# Patient Record
Sex: FEMALE | Race: WHITE | NOT HISPANIC OR LATINO | Employment: FULL TIME | URBAN - METROPOLITAN AREA
[De-identification: names, ages, dates, MRNs, and addresses within clinical notes are randomized per-mention and may not be internally consistent; named-entity substitution may affect disease eponyms.]

---

## 2018-01-22 ENCOUNTER — OFFICE VISIT CONVERTED (OUTPATIENT)
Dept: FAMILY MEDICINE CLINIC | Facility: CLINIC | Age: 48
End: 2018-01-22
Attending: NURSE PRACTITIONER

## 2018-03-15 ENCOUNTER — OFFICE VISIT CONVERTED (OUTPATIENT)
Dept: FAMILY MEDICINE CLINIC | Facility: CLINIC | Age: 48
End: 2018-03-15
Attending: NURSE PRACTITIONER

## 2018-03-15 ENCOUNTER — CONVERSION ENCOUNTER (OUTPATIENT)
Dept: FAMILY MEDICINE CLINIC | Facility: CLINIC | Age: 48
End: 2018-03-15

## 2018-06-19 ENCOUNTER — OFFICE VISIT CONVERTED (OUTPATIENT)
Dept: FAMILY MEDICINE CLINIC | Facility: CLINIC | Age: 48
End: 2018-06-19
Attending: NURSE PRACTITIONER

## 2018-12-17 ENCOUNTER — OFFICE VISIT CONVERTED (OUTPATIENT)
Dept: FAMILY MEDICINE CLINIC | Facility: CLINIC | Age: 48
End: 2018-12-17
Attending: NURSE PRACTITIONER

## 2018-12-17 ENCOUNTER — CONVERSION ENCOUNTER (OUTPATIENT)
Dept: FAMILY MEDICINE CLINIC | Facility: CLINIC | Age: 48
End: 2018-12-17

## 2019-07-12 ENCOUNTER — HOSPITAL ENCOUNTER (OUTPATIENT)
Dept: URGENT CARE | Facility: CLINIC | Age: 49
Discharge: HOME | End: 2019-07-12
Attending: PHYSICIAN ASSISTANT

## 2019-07-29 ENCOUNTER — HOSPITAL ENCOUNTER (OUTPATIENT)
Dept: URGENT CARE | Facility: CLINIC | Age: 49
Discharge: HOME | End: 2019-07-29
Attending: NURSE PRACTITIONER

## 2019-07-30 ENCOUNTER — OFFICE VISIT CONVERTED (OUTPATIENT)
Dept: FAMILY MEDICINE CLINIC | Facility: CLINIC | Age: 49
End: 2019-07-30
Attending: NURSE PRACTITIONER

## 2019-07-30 ENCOUNTER — CONVERSION ENCOUNTER (OUTPATIENT)
Dept: FAMILY MEDICINE CLINIC | Facility: CLINIC | Age: 49
End: 2019-07-30

## 2019-08-02 ENCOUNTER — HOSPITAL ENCOUNTER (OUTPATIENT)
Dept: URGENT CARE | Facility: CLINIC | Age: 49
Discharge: HOME | End: 2019-08-02

## 2019-08-26 ENCOUNTER — OFFICE VISIT CONVERTED (OUTPATIENT)
Dept: FAMILY MEDICINE CLINIC | Facility: CLINIC | Age: 49
End: 2019-08-26
Attending: NURSE PRACTITIONER

## 2019-08-26 ENCOUNTER — CONVERSION ENCOUNTER (OUTPATIENT)
Dept: FAMILY MEDICINE CLINIC | Facility: CLINIC | Age: 49
End: 2019-08-26

## 2019-09-16 ENCOUNTER — CONVERSION ENCOUNTER (OUTPATIENT)
Dept: FAMILY MEDICINE CLINIC | Facility: CLINIC | Age: 49
End: 2019-09-16

## 2019-09-16 ENCOUNTER — OFFICE VISIT CONVERTED (OUTPATIENT)
Dept: FAMILY MEDICINE CLINIC | Facility: CLINIC | Age: 49
End: 2019-09-16
Attending: NURSE PRACTITIONER

## 2019-11-06 ENCOUNTER — HOSPITAL ENCOUNTER (OUTPATIENT)
Dept: URGENT CARE | Facility: CLINIC | Age: 49
Discharge: HOME | End: 2019-11-06

## 2019-11-11 ENCOUNTER — OFFICE VISIT CONVERTED (OUTPATIENT)
Dept: FAMILY MEDICINE CLINIC | Facility: CLINIC | Age: 49
End: 2019-11-11
Attending: NURSE PRACTITIONER

## 2019-12-26 ENCOUNTER — CONVERSION ENCOUNTER (OUTPATIENT)
Dept: FAMILY MEDICINE CLINIC | Facility: CLINIC | Age: 49
End: 2019-12-26

## 2019-12-26 ENCOUNTER — OFFICE VISIT CONVERTED (OUTPATIENT)
Dept: FAMILY MEDICINE CLINIC | Facility: CLINIC | Age: 49
End: 2019-12-26
Attending: NURSE PRACTITIONER

## 2020-01-02 ENCOUNTER — HOSPITAL ENCOUNTER (OUTPATIENT)
Dept: MAMMOGRAPHY | Facility: HOSPITAL | Age: 50
Discharge: HOME | End: 2020-01-02
Attending: NURSE PRACTITIONER

## 2020-01-09 ENCOUNTER — HOSPITAL ENCOUNTER (OUTPATIENT)
Dept: OTHER | Facility: HOSPITAL | Age: 50
Discharge: HOME | End: 2020-01-09
Attending: NURSE PRACTITIONER

## 2020-01-09 LAB
25(OH)D3 SERPL-MCNC: 22.5 NG/ML (ref 30–100)
ALBUMIN SERPL-MCNC: 4.3 G/DL (ref 3.5–5)
ALBUMIN/GLOB SERPL: 1.4 {RATIO} (ref 1.4–2.6)
ALP SERPL-CCNC: 76 U/L (ref 42–98)
ALT SERPL-CCNC: 12 U/L (ref 10–40)
ANION GAP SERPL CALC-SCNC: 16 MMOL/L (ref 8–19)
AST SERPL-CCNC: 12 U/L (ref 15–50)
BASOPHILS # BLD AUTO: 0.05 10*3/UL (ref 0–0.2)
BASOPHILS NFR BLD AUTO: 0.6 % (ref 0–3)
BILIRUB SERPL-MCNC: 0.41 MG/DL (ref 0.2–1.3)
BUN SERPL-MCNC: 10 MG/DL (ref 5–25)
BUN/CREAT SERPL: 16 {RATIO} (ref 6–20)
CALCIUM SERPL-MCNC: 9.3 MG/DL (ref 8.7–10.4)
CHLORIDE SERPL-SCNC: 99 MMOL/L (ref 99–111)
CHOLEST SERPL-MCNC: 126 MG/DL (ref 107–200)
CHOLEST/HDLC SERPL: 3.3 {RATIO} (ref 3–6)
CONV ABS IMM GRAN: 0.03 10*3/UL (ref 0–0.2)
CONV CO2: 28 MMOL/L (ref 22–32)
CONV CREATININE URINE, RANDOM: 99.2 MG/DL (ref 10–300)
CONV IMMATURE GRAN: 0.4 % (ref 0–1.8)
CONV MICROALBUM.,U,RANDOM: 16.9 MG/L (ref 0–20)
CONV TOTAL PROTEIN: 7.4 G/DL (ref 6.3–8.2)
CREAT UR-MCNC: 0.63 MG/DL (ref 0.5–0.9)
DEPRECATED RDW RBC AUTO: 47.2 FL (ref 36.4–46.3)
EOSINOPHIL # BLD AUTO: 0.11 10*3/UL (ref 0–0.7)
EOSINOPHIL # BLD AUTO: 1.3 % (ref 0–7)
ERYTHROCYTE [DISTWIDTH] IN BLOOD BY AUTOMATED COUNT: 13.7 % (ref 11.7–14.4)
EST. AVERAGE GLUCOSE BLD GHB EST-MCNC: 120 MG/DL
GFR SERPLBLD BASED ON 1.73 SQ M-ARVRAT: >60 ML/MIN/{1.73_M2}
GLOBULIN UR ELPH-MCNC: 3.1 G/DL (ref 2–3.5)
GLUCOSE SERPL-MCNC: 98 MG/DL (ref 65–99)
HBA1C MFR BLD: 5.8 % (ref 3.5–5.7)
HCT VFR BLD AUTO: 51.1 % (ref 37–47)
HDLC SERPL-MCNC: 38 MG/DL (ref 40–60)
HGB BLD-MCNC: 16.7 G/DL (ref 12–16)
LDLC SERPL CALC-MCNC: 68 MG/DL (ref 70–100)
LYMPHOCYTES # BLD AUTO: 2.1 10*3/UL (ref 1–5)
LYMPHOCYTES NFR BLD AUTO: 25.6 % (ref 20–45)
MCH RBC QN AUTO: 30.3 PG (ref 27–31)
MCHC RBC AUTO-ENTMCNC: 32.7 G/DL (ref 33–37)
MCV RBC AUTO: 92.6 FL (ref 81–99)
MICROALBUMIN/CREAT UR: 17 MG/G{CRE} (ref 0–35)
MONOCYTES # BLD AUTO: 0.53 10*3/UL (ref 0.2–1.2)
MONOCYTES NFR BLD AUTO: 6.5 % (ref 3–10)
NEUTROPHILS # BLD AUTO: 5.37 10*3/UL (ref 2–8)
NEUTROPHILS NFR BLD AUTO: 65.6 % (ref 30–85)
NRBC CBCN: 0 % (ref 0–0.7)
OSMOLALITY SERPL CALC.SUM OF ELEC: 287 MOSM/KG (ref 273–304)
PLATELET # BLD AUTO: 169 10*3/UL (ref 130–400)
PMV BLD AUTO: 11.5 FL (ref 9.4–12.3)
POTASSIUM SERPL-SCNC: 4.1 MMOL/L (ref 3.5–5.3)
RBC # BLD AUTO: 5.52 10*6/UL (ref 4.2–5.4)
SODIUM SERPL-SCNC: 139 MMOL/L (ref 135–147)
T4 FREE SERPL-MCNC: 1.1 NG/DL (ref 0.9–1.8)
TRIGL SERPL-MCNC: 98 MG/DL (ref 40–150)
TSH SERPL-ACNC: 1.5 M[IU]/L (ref 0.27–4.2)
VLDLC SERPL-MCNC: 20 MG/DL (ref 5–37)
WBC # BLD AUTO: 8.19 10*3/UL (ref 4.8–10.8)

## 2020-01-21 ENCOUNTER — HOSPITAL ENCOUNTER (OUTPATIENT)
Dept: MAMMOGRAPHY | Facility: HOSPITAL | Age: 50
Discharge: HOME | End: 2020-01-21
Attending: NURSE PRACTITIONER

## 2020-02-17 ENCOUNTER — OFFICE VISIT CONVERTED (OUTPATIENT)
Dept: FAMILY MEDICINE CLINIC | Facility: CLINIC | Age: 50
End: 2020-02-17
Attending: NURSE PRACTITIONER

## 2020-02-26 ENCOUNTER — HOSPITAL ENCOUNTER (OUTPATIENT)
Dept: URGENT CARE | Facility: CLINIC | Age: 50
Discharge: HOME | End: 2020-02-26

## 2020-03-30 ENCOUNTER — TELEMEDICINE CONVERTED (OUTPATIENT)
Dept: FAMILY MEDICINE CLINIC | Facility: CLINIC | Age: 50
End: 2020-03-30
Attending: NURSE PRACTITIONER

## 2020-04-17 ENCOUNTER — CONVERSION ENCOUNTER (OUTPATIENT)
Dept: FAMILY MEDICINE CLINIC | Facility: CLINIC | Age: 50
End: 2020-04-17

## 2020-04-17 ENCOUNTER — OFFICE VISIT CONVERTED (OUTPATIENT)
Dept: FAMILY MEDICINE CLINIC | Facility: CLINIC | Age: 50
End: 2020-04-17
Attending: NURSE PRACTITIONER

## 2020-04-17 ENCOUNTER — HOSPITAL ENCOUNTER (OUTPATIENT)
Dept: FAMILY MEDICINE CLINIC | Facility: CLINIC | Age: 50
Discharge: HOME | End: 2020-04-17
Attending: NURSE PRACTITIONER

## 2020-04-19 LAB
AMOXICILLIN+CLAV SUSC ISLT: >=32
BACTERIA UR CULT: ABNORMAL
CEFAZOLIN SUSC ISLT: >=64
CEFEPIME SUSC ISLT: <=1
CEFTAZIDIME SUSC ISLT: <=1
CEFTRIAXONE SUSC ISLT: <=1
CEFUROXIME ORAL SUSC ISLT: 4
CEFUROXIME PARENTER SUSC ISLT: 4
CIPROFLOXACIN SUSC ISLT: <=0.25
ERTAPENEM SUSC ISLT: <=0.5
GENTAMICIN SUSC ISLT: <=1
LEVOFLOXACIN SUSC ISLT: <=0.12
NITROFURANTOIN SUSC ISLT: 128
TETRACYCLINE SUSC ISLT: <=1
TMP SMX SUSC ISLT: <=20
TOBRAMYCIN SUSC ISLT: <=1

## 2020-06-08 ENCOUNTER — TELEPHONE CONVERTED (OUTPATIENT)
Dept: FAMILY MEDICINE CLINIC | Facility: CLINIC | Age: 50
End: 2020-06-08
Attending: NURSE PRACTITIONER

## 2020-06-23 ENCOUNTER — TELEMEDICINE CONVERTED (OUTPATIENT)
Dept: FAMILY MEDICINE CLINIC | Facility: CLINIC | Age: 50
End: 2020-06-23
Attending: NURSE PRACTITIONER

## 2020-06-25 ENCOUNTER — HOSPITAL ENCOUNTER (OUTPATIENT)
Dept: CARDIOLOGY | Facility: HOSPITAL | Age: 50
Discharge: HOME | End: 2020-06-25
Attending: NURSE PRACTITIONER

## 2020-08-17 ENCOUNTER — HOSPITAL ENCOUNTER (OUTPATIENT)
Dept: URGENT CARE | Facility: CLINIC | Age: 50
Discharge: HOME | End: 2020-08-17
Attending: NURSE PRACTITIONER

## 2020-08-17 LAB — GLUCOSE BLD-MCNC: 87 MG/DL (ref 65–99)

## 2020-08-25 ENCOUNTER — HOSPITAL ENCOUNTER (OUTPATIENT)
Dept: OTHER | Facility: HOSPITAL | Age: 50
Discharge: HOME | End: 2020-08-25
Attending: PHYSICIAN ASSISTANT

## 2020-10-02 ENCOUNTER — OFFICE VISIT CONVERTED (OUTPATIENT)
Dept: ORTHOPEDIC SURGERY | Facility: CLINIC | Age: 50
End: 2020-10-02
Attending: ORTHOPAEDIC SURGERY

## 2020-10-20 ENCOUNTER — OFFICE VISIT CONVERTED (OUTPATIENT)
Dept: ORTHOPEDIC SURGERY | Facility: CLINIC | Age: 50
End: 2020-10-20
Attending: ORTHOPAEDIC SURGERY

## 2020-11-11 ENCOUNTER — HOSPITAL ENCOUNTER (OUTPATIENT)
Dept: PREADMISSION TESTING | Facility: HOSPITAL | Age: 50
Discharge: HOME | End: 2020-11-11
Attending: ORTHOPAEDIC SURGERY

## 2020-11-13 LAB — SARS-COV-2 RNA SPEC QL NAA+PROBE: NOT DETECTED

## 2020-11-16 ENCOUNTER — HOSPITAL ENCOUNTER (OUTPATIENT)
Dept: PERIOP | Facility: HOSPITAL | Age: 50
Setting detail: HOSPITAL OUTPATIENT SURGERY
Discharge: HOME | End: 2020-11-16
Attending: ORTHOPAEDIC SURGERY

## 2020-11-16 LAB
ANION GAP SERPL CALC-SCNC: 14 MMOL/L (ref 8–19)
BUN SERPL-MCNC: 9 MG/DL (ref 5–25)
BUN/CREAT SERPL: 20 {RATIO} (ref 6–20)
CALCIUM SERPL-MCNC: 9.3 MG/DL (ref 8.7–10.4)
CHLORIDE SERPL-SCNC: 103 MMOL/L (ref 99–111)
CONV CO2: 27 MMOL/L (ref 22–32)
CREAT UR-MCNC: 0.46 MG/DL (ref 0.5–0.9)
GFR SERPLBLD BASED ON 1.73 SQ M-ARVRAT: >60 ML/MIN/{1.73_M2}
GLUCOSE SERPL-MCNC: 93 MG/DL (ref 65–99)
OSMOLALITY SERPL CALC.SUM OF ELEC: 288 MOSM/KG (ref 273–304)
POTASSIUM SERPL-SCNC: 4.3 MMOL/L (ref 3.5–5.3)
SODIUM SERPL-SCNC: 140 MMOL/L (ref 135–147)

## 2020-12-01 ENCOUNTER — OFFICE VISIT CONVERTED (OUTPATIENT)
Dept: ORTHOPEDIC SURGERY | Facility: CLINIC | Age: 50
End: 2020-12-01
Attending: ORTHOPAEDIC SURGERY

## 2021-01-04 ENCOUNTER — OFFICE VISIT CONVERTED (OUTPATIENT)
Dept: ORTHOPEDIC SURGERY | Facility: CLINIC | Age: 51
End: 2021-01-04
Attending: PHYSICIAN ASSISTANT

## 2021-01-04 ENCOUNTER — CONVERSION ENCOUNTER (OUTPATIENT)
Dept: ORTHOPEDIC SURGERY | Facility: CLINIC | Age: 51
End: 2021-01-04

## 2021-02-02 ENCOUNTER — HOSPITAL ENCOUNTER (OUTPATIENT)
Dept: FAMILY MEDICINE CLINIC | Facility: CLINIC | Age: 51
Discharge: HOME | End: 2021-02-02
Attending: NURSE PRACTITIONER

## 2021-02-02 ENCOUNTER — OFFICE VISIT CONVERTED (OUTPATIENT)
Dept: FAMILY MEDICINE CLINIC | Facility: CLINIC | Age: 51
End: 2021-02-02
Attending: NURSE PRACTITIONER

## 2021-02-02 ENCOUNTER — CONVERSION ENCOUNTER (OUTPATIENT)
Dept: FAMILY MEDICINE CLINIC | Facility: CLINIC | Age: 51
End: 2021-02-02

## 2021-02-02 LAB
25(OH)D3 SERPL-MCNC: 25.5 NG/ML (ref 30–100)
ALBUMIN SERPL-MCNC: 4.1 G/DL (ref 3.5–5)
ALBUMIN/GLOB SERPL: 1.2 {RATIO} (ref 1.4–2.6)
ALP SERPL-CCNC: 79 U/L (ref 42–98)
ALT SERPL-CCNC: 16 U/L (ref 10–40)
ANION GAP SERPL CALC-SCNC: 16 MMOL/L (ref 8–19)
AST SERPL-CCNC: 17 U/L (ref 15–50)
BASOPHILS # BLD AUTO: 0.03 10*3/UL (ref 0–0.2)
BASOPHILS NFR BLD AUTO: 0.3 % (ref 0–3)
BILIRUB SERPL-MCNC: 0.31 MG/DL (ref 0.2–1.3)
BUN SERPL-MCNC: 14 MG/DL (ref 5–25)
BUN/CREAT SERPL: 22 {RATIO} (ref 6–20)
CALCIUM SERPL-MCNC: 9.6 MG/DL (ref 8.7–10.4)
CHLORIDE SERPL-SCNC: 102 MMOL/L (ref 99–111)
CHOLEST SERPL-MCNC: 111 MG/DL (ref 107–200)
CHOLEST/HDLC SERPL: 2.8 {RATIO} (ref 3–6)
CONV ABS IMM GRAN: 0.04 10*3/UL (ref 0–0.2)
CONV CO2: 29 MMOL/L (ref 22–32)
CONV CREATININE URINE, RANDOM: 19.7 MG/DL (ref 10–300)
CONV IMMATURE GRAN: 0.4 % (ref 0–1.8)
CONV MICROALBUM.,U,RANDOM: <12 MG/L (ref 0–20)
CONV TOTAL PROTEIN: 7.4 G/DL (ref 6.3–8.2)
CREAT UR-MCNC: 0.64 MG/DL (ref 0.5–0.9)
DEPRECATED RDW RBC AUTO: 46.6 FL (ref 36.4–46.3)
EOSINOPHIL # BLD AUTO: 0.13 10*3/UL (ref 0–0.7)
EOSINOPHIL # BLD AUTO: 1.3 % (ref 0–7)
ERYTHROCYTE [DISTWIDTH] IN BLOOD BY AUTOMATED COUNT: 13.8 % (ref 11.7–14.4)
GFR SERPLBLD BASED ON 1.73 SQ M-ARVRAT: >60 ML/MIN/{1.73_M2}
GLOBULIN UR ELPH-MCNC: 3.3 G/DL (ref 2–3.5)
GLUCOSE SERPL-MCNC: 98 MG/DL (ref 65–99)
HCT VFR BLD AUTO: 52.1 % (ref 37–47)
HDLC SERPL-MCNC: 40 MG/DL (ref 40–60)
HGB BLD-MCNC: 17.5 G/DL (ref 12–16)
IRON SATN MFR SERPL: 24 % (ref 20–55)
IRON SERPL-MCNC: 86 UG/DL (ref 60–170)
LDLC SERPL CALC-MCNC: 54 MG/DL (ref 70–100)
LYMPHOCYTES # BLD AUTO: 3.17 10*3/UL (ref 1–5)
LYMPHOCYTES NFR BLD AUTO: 32.4 % (ref 20–45)
MCH RBC QN AUTO: 30.5 PG (ref 27–31)
MCHC RBC AUTO-ENTMCNC: 33.6 G/DL (ref 33–37)
MCV RBC AUTO: 90.9 FL (ref 81–99)
MICROALBUMIN/CREAT UR: 60.9 MG/G{CRE} (ref 0–35)
MONOCYTES # BLD AUTO: 0.64 10*3/UL (ref 0.2–1.2)
MONOCYTES NFR BLD AUTO: 6.5 % (ref 3–10)
NEUTROPHILS # BLD AUTO: 5.77 10*3/UL (ref 2–8)
NEUTROPHILS NFR BLD AUTO: 59.1 % (ref 30–85)
NRBC CBCN: 0 % (ref 0–0.7)
OSMOLALITY SERPL CALC.SUM OF ELEC: 294 MOSM/KG (ref 273–304)
PLATELET # BLD AUTO: 150 10*3/UL (ref 130–400)
PMV BLD AUTO: 13.3 FL (ref 9.4–12.3)
POTASSIUM SERPL-SCNC: 4.6 MMOL/L (ref 3.5–5.3)
RBC # BLD AUTO: 5.73 10*6/UL (ref 4.2–5.4)
SODIUM SERPL-SCNC: 142 MMOL/L (ref 135–147)
T4 FREE SERPL-MCNC: 1.3 NG/DL (ref 0.9–1.8)
TIBC SERPL-MCNC: 362 UG/DL (ref 245–450)
TRANSFERRIN SERPL-MCNC: 253 MG/DL (ref 250–380)
TRIGL SERPL-MCNC: 84 MG/DL (ref 40–150)
TSH SERPL-ACNC: 1.06 M[IU]/L (ref 0.27–4.2)
VLDLC SERPL-MCNC: 17 MG/DL (ref 5–37)
WBC # BLD AUTO: 9.78 10*3/UL (ref 4.8–10.8)

## 2021-02-03 LAB
EST. AVERAGE GLUCOSE BLD GHB EST-MCNC: 108 MG/DL
FOLATE SERPL-MCNC: 17 NG/ML (ref 4.8–20)
HBA1C MFR BLD: 5.4 % (ref 3.5–5.7)
VIT B12 SERPL-MCNC: 943 PG/ML (ref 211–911)

## 2021-02-15 ENCOUNTER — OFFICE VISIT CONVERTED (OUTPATIENT)
Dept: ORTHOPEDIC SURGERY | Facility: CLINIC | Age: 51
End: 2021-02-15
Attending: PHYSICIAN ASSISTANT

## 2021-04-01 ENCOUNTER — OFFICE VISIT CONVERTED (OUTPATIENT)
Dept: ORTHOPEDIC SURGERY | Facility: CLINIC | Age: 51
End: 2021-04-01
Attending: PHYSICIAN ASSISTANT

## 2021-04-26 ENCOUNTER — OFFICE VISIT CONVERTED (OUTPATIENT)
Dept: ORTHOPEDIC SURGERY | Facility: CLINIC | Age: 51
End: 2021-04-26
Attending: PHYSICIAN ASSISTANT

## 2021-05-10 NOTE — H&P
History and Physical      Patient Name: Maureen Courtney   Patient ID: [id number]   Sex: Female   YOB: 1970    Primary Care Provider: Lisseth CASTANEDA    Visit Date: October 2, 2020    Provider: Yang Velez MD   Location: [facility]   Location Address: [address] [place] [address]  [id number]   Location Phone: [phone]          Chief Complaint  · Right shoulder pain      History Of Present Illness  Maureen Courtney is a 49 year old /White female who presents today to [facility].      The patient presents here today for evaluation of right shoulder pain. The patient injured her shoulder on 8/24/20 at work pushing a cart in and felt a pull. Then she had to use a hoist which she feels made the injury worse.  She works at [employer]. She locates her pain to post shoulder. She has tried physical therapy and anti-inflammatories with some relief. The patient cant have any steroid injections due to being allergic.       Past Medical History  Anxiety; Asthma; Chest pain; Diabetes Mellitus, Type II; Headache; Hepatitis, Chronic; Hypertension; Mitral Valve Disorders         Past Surgical History  Carpal tunnel release; Hysterectomy; Tubal_Ligation         Medication List  albuterol sulfate 2.5 mg /3 mL (0.083 %) inhalation solution for nebulization; Bactrim -160 mg oral tablet; Benadryl 25 mg Oral Capsule; buspirone 10 mg oral tablet; cyclobenzaprine 10 mg oral tablet; EpiPen 0.3 mg/0.3 mL injection auto-injector; hydrochlorothiazide 25 mg oral tablet; ibuprofen 800 mg oral tablet; ipratropium bromide 0.02 % inhalation solution; metoprolol succinate 100 mg oral tablet extended release 24 hr; montelukast 10 mg oral tablet; ProAir HFA 90 mcg/actuation inhalation HFA aerosol inhaler; Zyrtec 10 mg Oral Tablet         Allergy List  Animal Dander; beef; Coffee; Dust Mite; Egg; Egg allergy; erythromycin; Grasses; Keflex; Molds; MSG (monosodium glutamate);  "Pineapple; pork; prednisone; Red Bull; Red Meats; Seasonal; TETANUS         Family Medical History  Asthma; Heart Disease; Hyperlipidemia; Congestive Heart Failure; Heart Failure; Hypertension; Diabetes, unspecified         Reproductive History   3 Para 2 0 1 0       Social History  Alcohol (Current some day); ; ; Other Substance Use (Never); Tobacco (Current every day); Vapes (Former)         Review of Systems  · Constitutional  o Denies  o : fever, chills, weight loss  · Cardiovascular  o Denies  o : chest pain, shortness of breath  · Gastrointestinal  o Denies  o : liver disease, heartburn, nausea, blood in stools  · Genitourinary  o Denies  o : painful urination, blood in urine  · Integument  o Denies  o : rash, itching  · Neurologic  o Denies  o : headache, weakness, loss of consciousness  · Musculoskeletal  o Denies  o : painful, swollen joints  · Psychiatric  o Denies  o : drug/alcohol addiction, anxiety, depression      Vitals  Date Time BP Position Site L\R Cuff Size HR RR TEMP (F) WT  HT  BMI kg/m2 BSA m2 O2 Sat FR L/min FiO2 HC       10/02/2020 02:43 PM      72 - R   192lbs 0oz 5'  3\" 34.01 1.97 89 %            Physical Examination  · Constitutional  o Appearance  o : well developed, well-nourished, no obvious deformities present  · Head and Face  o Head  o :   § Inspection  § : normocephalic  o Face  o :   § Inspection  § : no facial lesions  · Eyes  o Conjunctivae  o : conjunctivae normal  o Sclerae  o : sclerae white  · Ears, Nose, Mouth and Throat  o Ears  o :   § External Ears  § : appearance within normal limits  § Hearing  § : intact  o Nose  o :   § External Nose  § : appearance normal  · Neck  o Inspection/Palpation  o : normal appearance  o Range of Motion  o : full range of motion  · Respiratory  o Respiratory Effort  o : breathing unlabored  o Inspection of Chest  o : normal appearance  o Auscultation of Lungs  o : no audible wheezing or " rales  · Cardiovascular  o Heart  o : regular rate  · Gastrointestinal  o Abdominal Examination  o : soft and non-tender  · Skin and Subcutaneous Tissue  o General Inspection  o : intact, no rashes  · Psychiatric  o General  o : Alert and oriented x3  o Judgement and Insight  o : judgment and insight intact  o Mood and Affect  o : mood normal, affect appropriate  · Right Shoulder  o Inspection  o : Tender to anterior and post shoulder , Abduction 140; ER 80, IR 70. 4+ Supraspinatus strength. Positive Piña and Neer. Pain with cross arm abduction.   · Imaging  o Imaging  o : [facility] X-ray 08/2020: 1. No acute osseous abnormality. 2. Moderate osteoarthritic degenerative changes of the glenohumeral joint. Moderate age-related changes of the a.c. joint are also noted.          Assessment  · AC (acromioclavicular) arthritis     716.91/M19.019  · Right shoulder pain, unspecified chronicity     719.41/M25.511  · Shoulder tendonitis, right     726.10/M75.81      Plan  · Orders  o Tobacco cessation counseling completed (4004F) - - 10/05/2020  · Medications  o Medications have been Reconciled  o Transition of Care or Provider Policy  · Instructions  o X-rays reviewed by Dr. Velez.  o Reviewed the patient's Past Medical, Social, and Family history as well as the ROS at today's visit, no changes.  o Call or return if worsening symptoms.  o Follow up after MRI.  o The above service was scribed by Cherelle Heard on my behalf and I attest to the accuracy of the note. jsb  o Discussed treatment options with the patient. Plan for MRI of Right shoulder to rule out internal derangement. Follow up after for results. Work note given with restrictions.  o Electronically Identified Patient Education Materials Provided Electronically            Electronically Signed by: Cherelle Heard MA -Author on October 5, 2020 02:16:44 PM  Electronically Co-signed by: Yang Velez MD -Reviewer on October 6, 2020  10:13:44 PM

## 2021-05-12 NOTE — PROGRESS NOTES
Quick Note      Patient Name: Maureen Courtney   Patient ID: [id number]   Sex: Female   YOB: 1970    Primary Care Provider: Lisseth CASTANEDA    Visit Date: March 30, 2020    Provider: LEONEL River   Location: [facility]   Location Address: [address] [place] [address] [place]  [id number]   Location Phone: [phone]          History Of Present Illness  TELEHEALTH VISIT  Chief Complaint: Cough x 2 weeks/productive green in mornings   Maureen Courtney is a 49 year old /White female who is presenting for evaluation via telehealth visit. Verbal consent obtained before beginning visit.   Provider spent 11:11-11:24 minutes with the patient during telehealth visit.   The following staff were present during this visit: CMartin lpn, self (con), pt   Past Medical History/Overview of Patient Symptoms  Maureen Courtney is a 49 year old /White female who presents for evaluation and treatment of:      She was treated by my in Feb and did feel better.  She is worried that she will get pneumonia (she does have a hx of it and still smoking).  She is coughing and no fever,  She feels that it is croupy.  She is doing breathing treatments and neb.  She has not been taking any other OTC meds.  She is not having any cp/soa(no more than usual), not working right now, no n/v/d.  She is drinking plenty of fluids.           Assessment  · Mild intermittent asthma with acute exacerbation     493.92/J45.21  · Bronchitis, acute     466.0/J20.9  · Cough     786.2/R05      Plan  · Orders  o Physician Telephone Evaluation, 11-20 minutes (94857) - - 03/30/2020  o ACO-17: Screened for tobacco use AND received tobacco cessation intervention (4004F) - - 03/30/2020  o ACO-39: Current medications updated and reviewed () - - 03/30/2020  o ACO-14: Influenza immunization was not administered for reasons documented () - - 03/30/2020  · Medications  o Augmentin 875-125 mg oral tablet   SIG:  take 1 tablet by oral route 2 times a day for 10 days   DISP: (20) tablets with 0 refills  Prescribed on 03/30/2020     o Florajen 460 mg (20 billion cell) oral capsule   SIG: take 1 capsule by oral route daily for 30 days   DISP: (30) capsules with 0 refills  Prescribed on 03/30/2020     o ipratropium bromide 0.02 % inhalation solution   SIG: inhale 2.5 milliliters (500 mcg) via nebulizer by inhalation route every 8 hours   DISP: (75) 2.5 ml vial with 0 refills  Prescribed on 03/30/2020     · Instructions  o Plan Of Care:   o Take all medications as prescribed/directed.  o Rest. Increase Fluids.  o Call the office with any concerns or questions.  o Discusssed about pneumonia. If not better in 1 week call me and we will set up a CXR. Stop smoking. Drink plenty of fluids. Get OTC Mucinex plain twice a day. Do neb treatments TID.  o Electronically Identified Patient Education Materials Provided Electronically  · Disposition  o Call or Return if symptoms worsen or persist.            Electronically Signed by: LEONEL River -Author on March 30, 2020 12:30:53 PM

## 2021-05-12 NOTE — PROGRESS NOTES
"   Progress Note      Patient Name: Maureen Courtney   Patient ID: [id number]   Sex: Female   YOB: 1970    Primary Care Provider: Lisseth CASTANEDA    Visit Date: April 17, 2020    Provider: LEONEL Villegas   [facility]   Location Address[facility] [address] [place] [address] [facility] [place]  [id number]   Location Phone: [phone]          Chief Complaint     Urinary pain started Wednesday         History Of Present Illness  Maureen Courtney is a 49 year old /White female who presents for evaluation and treatment of:      dysuria and increased urinary frequency for 2 days. She did just finish augmentin. Denies fever.  No back pain.    asthma exacerbation, she was trying to get by without the steroids, but is wanting to take them. She has been wheezing, cough is productive. She hasn't used her inhaler today.    vaginiitis, she denies itching or discharge.       Allergy List    Allergies Reconciled  Review of Systems  · Constitutional  o Denies  o : fever, fatigue, weight loss, weight gain  · Cardiovascular  o Denies  o : lower extremity edema, chest pressure, palpitations  · Respiratory  o Admits  o : cough, wheezing, shortness of breath  · Gastrointestinal  o Denies  o : nausea, vomiting, diarrhea, constipation, abdominal pain  · Genitourinary  o Admits  o : urgency, frequency, dysuria      Vitals  Date Time BP Position Site L\R Cuff Size HR RR TEMP (F) WT  HT  BMI kg/m2 BSA m2 O2 Sat HC       04/17/2020 11:05 /80 Sitting    79 - R 12 98.2 218lbs 2oz 5'  3.5\" 38.03 2.11 91 %          Physical Examination  · Constitutional  o Appearance  o : well developed, well-nourished, no acute distress  · Ears, Nose, Mouth and Throat  o Ears  o :   § External Ears  § : external auditory canal appearance normal, no discharge present  § Otoscopic Examination  § : tympanic membranes pearly white/gray bilaterally  o Nose  o :   § External Nose  § : no lesions noted  § Intranasal Exam  § : " nasal mucosa light pink, no intranasal lesions present, nares patent  § Nasopharynx  § : no discharge present  o Oral Cavity  o :   § Oral Mucosa  § : oral mucosa light pink  o Throat  o :   § Oropharynx  § : tonsils without exudate, no palatal petechiae  · Neck  o Inspection/Palpation  o : normal appearance, no masses or tenderness, trachea midline  o Thyroid  o : gland size normal, nontender, no nodules or masses present on palpation  · Respiratory  o Respiratory Effort  o : breathing unlabored  o Inspection of Chest  o : chest rise symmetric bilaterally  o Auscultation of Lungs  o : inspiratory and expiratory wheezes  · Cardiovascular  o Heart  o :   § Auscultation of Heart  § : regular rate and rhythm, no murmurs, gallops or rubs  o Peripheral Vascular System  o :   § Extremities  § : no edema  · Lymphatic  o Neck  o : no cervical lymphadenopathy, no supraclavicular lymphadenopathy  · Psychiatric  o Mood and Affect  o : mood normal, affect appropriate     abdomen soft non distended non tender, positive bowel sounds           Results  · In-Office Procedures  o Lab procedure  § IOP - Urinalysis without Microscopy (Clinitek) [facility] (75293)   § Color Ur: Yellow   § Clarity Ur: Cloudy   § Glucose Ur Ql Strip: Negative   § Bilirub Ur Ql Strip: Negative   § Ketones Ur Ql Strip: Negative   § Sp Gr Ur Qn: 1.010   § Hgb Ur Ql Strip: Large   § pH Ur-LsCnc: 6.5   § Prot Ur Ql Strip: Trace   § Urobilinogen Ur Strip-mCnc: 0.2 E.U./dL   § Nitrite Ur Ql Strip: Negative   § WBC Est Ur Ql Strip: Large       Assessment  · Asthma exacerbation     493.92/J45.901  · Vaginitis     616.10/N76.0  · UTI (urinary tract infection)     599.0/N39.0    Problems Reconciled  Plan  · Orders  o Urine culture (93295, 56818) - - 04/17/2020  o ACO-39: Current medications updated and reviewed () - - 04/17/2020  o Vag Screen Infectious agent detection by nucleic acid DNA or RNA (54421) - - 04/17/2020  · Medications  o Medrol (Alessandro) 4 mg oral  tablets,dose pack   SIG: take by oral route as directed per package instructions   DISP: (1) 21 ct dose-pack with 0 refills  Prescribed on 04/17/2020     o Bactrim -160 mg oral tablet   SIG: take 1 tablet by oral route every 12 hours for 7 days   DISP: (14) tablets with 0 refills  Prescribed on 04/17/2020     o Pyridium 200 mg oral tablet   SIG: take 1 tablet (200 mg) by oral route 3 times per day after meals for 2 days   DISP: (6) tablets with 0 refills  Prescribed on 04/17/2020     o Medications have been Reconciled  o Transition of Care or Provider Policy  · Instructions  o Take all medications as prescribed/directed.  o Patient was educated/instructed on their diagnosis, treatment and medications prior to discharge from the clinic today.  o Patient instructed to seek medical attention urgently for new or worsening symptoms.  o Call the office with any concerns or questions.  o Chronic conditions reviewed and taken into consideration for today's treatment plan.  o Discussed Covid-19 precautions including, but not limited to, social distancing, avoid touching your face, and hand washing.   o pt reports allergy to prednsion but says she does fine with the oral med, not the shots.   · Disposition  o Call or Return if symptoms worsen or persist.            Electronically Signed by: LEONEL Villegas -Author on April 17, 2020 11:28:47 AM
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
## 2021-05-13 NOTE — PROGRESS NOTES
Progress Note      Patient Name: Maureen Courtney   Patient ID: [id number]   Sex: Female   YOB: 1970    Primary Care Provider: Lisseth CASTANEDA    Visit Date: December 1, 2020    Provider: Yang Velez MD   Location: [facility]   Location Address: [address] [place] [address]  [id number]   Location Phone: [phone]          Chief Complaint  · Right shoulder pain       History Of Present Illness  Maureen Courtney is a 49 year old /White female who presents today to [facility]. Patient presents for 2-week postoperative evaluation of right shoulder arthroscopic rotator cuff debridement, subacromial decompression, distal clavicle resection, 11/16/2020. Patient states she has been doing well, she states the pain is more than she expected, she states that she has some pain with getting dressed and doing physical therapy. Patient has been using her sling as directed. Patient states that she has missed some doses of her pain medication due to [employer] given her limited supply. Patient is requesting pain medication refill.       Past Medical History  Anxiety; Asthma; Chest pain; Diabetes Mellitus, Type II; Headache; Hepatitis, Chronic; Hypertension; Mitral Valve Disorders         Past Surgical History  Carpal tunnel release; Hysterectomy; Tubal_Ligation         Medication List  albuterol sulfate 2.5 mg /3 mL (0.083 %) inhalation solution for nebulization; Bactrim -160 mg oral tablet; Benadryl 25 mg Oral Capsule; buspirone 10 mg oral tablet; cyclobenzaprine 10 mg oral tablet; EpiPen 0.3 mg/0.3 mL injection auto-injector; hydrochlorothiazide 25 mg oral tablet; ibuprofen 800 mg oral tablet; ipratropium bromide 0.02 % inhalation solution; metoprolol succinate 100 mg oral tablet extended release 24 hr; montelukast 10 mg oral tablet; Percocet 7.5-325 mg oral tablet; ProAir HFA 90 mcg/actuation inhalation HFA aerosol inhaler; Zyrtec 10 mg Oral Tablet  "        Allergy List  Animal Dander; beef; Coffee; Dust Mite; Egg; Egg allergy; erythromycin; Grasses; Keflex; Molds; MSG (monosodium glutamate); Pineapple; pork; prednisone; Red Bull; Red Meats; Seasonal; TETANUS       Allergies Reconciled  Family Medical History  Asthma; Heart Disease; Hyperlipidemia; Congestive Heart Failure; Heart Failure; Hypertension; Diabetes, unspecified         Reproductive History   3 Para 2 0 1 0       Social History  Alcohol (Current some day); ; ; Other Substance Use (Never); Tobacco (Current every day); Vapes (Former)         Immunizations  Name Date Admin   Influenza Refused   Influenza 10/21/2014         Review of Systems  · Constitutional  o Denies  o : fever, chills, weight loss  · Cardiovascular  o Denies  o : chest pain, shortness of breath  · Gastrointestinal  o Denies  o : liver disease, heartburn, nausea, blood in stools  · Genitourinary  o Denies  o : painful urination, blood in urine  · Integument  o Denies  o : rash, itching  · Neurologic  o Denies  o : headache, weakness, loss of consciousness  · Musculoskeletal  o Denies  o : painful, swollen joints  · Psychiatric  o Denies  o : drug/alcohol addiction, anxiety, depression      Vitals  Date Time BP Position Site L\R Cuff Size HR RR TEMP (F) WT  HT  BMI kg/m2 BSA m2 O2 Sat FR L/min FiO2        2020 09:09 AM      80 - R   192lbs 4oz 5'  3\" 34.06 1.97 96 %            Physical Examination  · Constitutional  o Appearance  o : well developed, well-nourished, no obvious deformities present  · Head and Face  o Head  o :   § Inspection  § : normocephalic  o Face  o :   § Inspection  § : no facial lesions  · Eyes  o Conjunctivae  o : conjunctivae normal  o Sclerae  o : sclerae white  · Ears, Nose, Mouth and Throat  o Ears  o :   § External Ears  § : appearance within normal limits  § Hearing  § : intact  o Nose  o :   § External Nose  § : appearance normal  · Neck  o Inspection/Palpation  o : " normal appearance  o Range of Motion  o : full range of motion  · Respiratory  o Respiratory Effort  o : breathing unlabored  o Inspection of Chest  o : normal appearance  o Auscultation of Lungs  o : no audible wheezing or rales  · Cardiovascular  o Heart  o : regular rate  · Gastrointestinal  o Abdominal Examination  o : soft and non-tender  · Skin and Subcutaneous Tissue  o General Inspection  o : intact, no rashes  · Psychiatric  o General  o : Alert and oriented x3  o Judgement and Insight  o : judgment and insight intact  o Mood and Affect  o : mood normal, affect appropriate  · Right Shoulder  o Inspection  o : Incisions are healing well, no erythema, no ecchymosis, no swelling, no drainage, no signs of infection. Range of motion appropriate, neurovascularly intact.          Assessment  · Aftercare following surgery of right shoulder arthroscopic rotator cuff debridement, subacromial decompression, distal clavicle resection, 11/16/2020     V54.81  · Right shoulder pain, unspecified chronicity     719.41/M25.511      Plan  · Medications  o Medications have been Reconciled  o Transition of Care or Provider Policy  · Instructions  o Reviewed the patient's Past Medical, Social, and Family history as well as the ROS at today's visit, no changes.  o Call or return if worsening symptoms.  o Follow Up in 4 weeks.   o The above service was scribed by Ari Brannon PA-C on my behalf and I attest to the accuracy of the note. jsb  o Reviewed operative images with the patient, discussed procedure, patient was advised to wean out of her sling, she was advised to continue physical therapy. Pain medication refill was given. Follow-up in 4 weeks peer            Electronically Signed by: Brad Brannon PA-C -Author on December 1, 2020 11:41:20 AM  Electronically Co-signed by: Yang Velez MD -Reviewer on December 1, 2020 05:48:41 PM

## 2021-05-13 NOTE — PROGRESS NOTES
Progress Note      Patient Name: Maureen Courtney   Patient ID: [id number]   Sex: Female   YOB: 1970    Primary Care Provider: Lisseth CASTANEDA    Visit Date: June 23, 2020    Provider: LEONEL Villegas   Location: [facility]   Location Address: [address] [place] [address] [place]  [id number]   Location Phone: [phone]          Chief Complaint     Left leg has red spots, left leg swelling          History Of Present Illness  Video Conferencing Visit  Maureen Courtney is a 49 year old /White female who is presenting for evaluation via video conferencing via [employer] . Verbal consent obtained before beginning visit. Patient alone on call.   The following staff were present during this visit: AT/[facility]   Maureen Courtney is a 49 year old /White female who presents for evaluation and treatment of:      left leg swollen, red, warm to touch. Started actually several weeks ago. She thought it might be a bug bite. Denies hx of DVT. She would like to be off work the rest of the week.       Past Medical History  Disease Name Date Onset Notes   Anxiety --  --    Asthma --  --    Chest pain --  --    Diabetes Mellitus, Type II --  --    Headache --  --    Hepatitis, Chronic --  Hepatitis A 12/21/17    Hypertension --  --    Mitral Valve Disorders --  --          Past Surgical History  Procedure Name Date Notes   Carpal tunnel release --  --    Hysterectomy 08/27/2013 --    Tubal_Ligation --  --          Medication List  Name Date Started Instructions   albuterol sulfate 2.5 mg /3 mL (0.083 %) inhalation solution for nebulization 12/09/2014 inhale 3 milliliters (2.5 mg) by nebulization route 3 times per day as needed   Benadryl 25 mg Oral Capsule  take 1 capsule (25 mg) by oral route every 6 hours as needed   buspirone 10 mg oral tablet 12/26/2019 take 1 tablet (10 mg) by oral route 3 times per day for 30 days   cyclobenzaprine 10 mg oral tablet 12/26/2019 take 1 tablet by  oral route once a day (at bedtime)   EpiPen 0.3 mg/0.3 mL injection auto-injector 2019 use as directed   hydrochlorothiazide 25 mg oral tablet 2020 take 1 tablet (25 mg) by oral route once daily   ibuprofen 800 mg oral tablet 2019 take 1 tablet by oral route 3 times a day as needed   ipratropium bromide 0.02 % inhalation solution 2020 inhale 2.5 milliliters (500 mcg) via nebulizer by inhalation route every 8 hours   metoprolol succinate 100 mg oral tablet extended release 24 hr 2019 take 1 tablet (100 mg) by oral route once daily for 30 days   montelukast 10 mg oral tablet 2019 take 1 tablet (10 mg) by oral route once daily in the evening   ProAir HFA 90 mcg/actuation inhalation HFA aerosol inhaler 2019 inhale 1 - 2 puffs by inhalation route every 6 hours as needed   Zyrtec 10 mg Oral Tablet  take 1 tablet (10 mg) by oral route once daily         Allergy List  Allergen Name Date Reaction Notes   Animal Dander --  --  --    beef --  --  --    Coffee --  --  --    Dust Mite --  --  --    Egg --  --  --    Egg allergy --  --  --    erythromycin --  --  --    Grasses --  --  --    Molds --  --  --    MSG (monosodium glutamate) --  --  --    Pineapple --  --  --    pork --  --  --    prednisone --  --  can have it in pill form only according to patient on 2020    Red Bull --  --  itching   Red Meats --  --  --    Seasonal --  --  --    TETANUS --  --  --        Allergies Reconciled  Family Medical History  Disease Name Relative/Age Notes   Asthma  --    Heart Disease  --    Hyperlipidemia  --    Congestive Heart Failure Sister/   sister 38 yrs old downs syndrome   Heart Failure  --    Hypertension  --    Diabetes, unspecified  --          Reproductive History  Menstrual   Pregnancy Summary   Total Pregnancies: 3 Full Term: 2 Premature: 0   Ab Induced: 0 Ab Spontaneous: 1 Ectopics: 0   Multiples: 0 Livin         Social History  Finding Status Start/Stop Quantity Notes    Alcohol Current some day --/-- --  08/02/2016 - rarely occasion    --  --/-- --  12/3/2015 was  26 yrs    --  --/-- --  --    Other Substance Use Never --/-- --  --    Tobacco Current every day --/-- 1 pack a week    Vapes Former --/-- --  12/17/2018 - 06/19/2018 -          Immunizations  NameDate Admin Mfg Trade Name Lot Number Route Inj VIS Given VIS Publication   InfluenzaRefused 12/26/2019 NE Not Entered  NE NE     Comments:    Lacpahxrc00/21/2014 SKB Fluarix, quadrivalent, preservative free [id number] IM LD 10/21/2014 07/02/2012   Comments:          Review of Systems  · Constitutional  o Denies  o : fever, fatigue, weight loss, weight gain  · Cardiovascular  o Denies  o : lower extremity edema, claudication, chest pressure, palpitations  · Respiratory  o Denies  o : shortness of breath, wheezing, cough, hemoptysis, dyspnea on exertion  · Gastrointestinal  o Denies  o : nausea, vomiting, diarrhea, constipation, abdominal pain  · Integument  o Admits  o : rash, pigmentation changes  o Denies  o : itching  · Musculoskeletal  o Admits  o : muscle pain  o Denies  o : joint pain      Physical Examination  · Constitutional  o Appearance  o : well developed, well-nourished, no acute distress  · Respiratory  o Respiratory Effort  o : breathing unlabored  · Psychiatric  o Mood and Affect  o : mood normal, affect appropriate     there is an area of redness on the anterior lower left leg, tender to touch, slightly swollen.           Assessment  · Leg pain     729.5/M79.606  · Cellulitis     682.9/L03.90    Problems Reconciled  Plan  · Orders  o ACO-39: Current medications updated and reviewed () - - 06/23/2020  o ACO-14: Influenza immunization was not administered for reasons documented () - - 06/23/2020  o VELE (Dop Scan) Unilateral. (50374) - 729.5/M79.606 - 06/23/2020   left lower leg pain swelling  · Medications  o Keflex 500 mg oral capsule   SIG: take 1 capsule by oral route 3  times a day   DISP: (30) capsules with 0 refills  Prescribed on 06/23/2020     o Medications have been Reconciled  o Transition of Care or Provider Policy  · Instructions  o Take all medications as prescribed/directed.  o Patient was educated/instructed on their diagnosis, treatment and medications prior to discharge from the clinic today.  o Patient instructed to seek medical attention urgently for new or worsening symptoms.  o Call the office with any concerns or questions.  o Chronic conditions reviewed and taken into consideration for today's treatment plan.  o will r/o clot, start keflex if negative, keep elevated, off work for the rest of the week.   · Disposition  o Call or Return if symptoms worsen or persist.            Electronically Signed by: LEONEL Villegas -Author on June 23, 2020 11:33:19 AM

## 2021-05-13 NOTE — PROGRESS NOTES
Progress Note      Patient Name: Maureen Courtney   Patient ID: [id number]   Sex: Female   YOB: 1970    Primary Care Provider: Lisseth CASTANEDA    Visit Date: October 20, 2020    Provider: Yang Velez MD   Location: [facility]   Location Address: [address] [place] [address]  [id number]   Location Phone: [phone]          Chief Complaint  · Right shoulder pain      History Of Present Illness  Maureen Courtney is a 49 year old /White female who presents today to [facility].      Patient presents today with a follow-up of right shoulder pain. Patient presents today with MRI results of her shoulder. Patient complains of pain on the anterior aspect of her shoulder that radiates down her arm. Pain is worse activity and lifting overhead. Patient is anxious to get her shoulder fixed and cant have shoulder injections. Patient has been prolonging PT.       Past Medical History  Anxiety; Asthma; Chest pain; Diabetes Mellitus, Type II; Headache; Hepatitis, Chronic; Hypertension; Mitral Valve Disorders         Past Surgical History  Carpal tunnel release; Hysterectomy; Tubal_Ligation         Medication List  albuterol sulfate 2.5 mg /3 mL (0.083 %) inhalation solution for nebulization; Bactrim -160 mg oral tablet; Benadryl 25 mg Oral Capsule; buspirone 10 mg oral tablet; cyclobenzaprine 10 mg oral tablet; EpiPen 0.3 mg/0.3 mL injection auto-injector; hydrochlorothiazide 25 mg oral tablet; ibuprofen 800 mg oral tablet; ipratropium bromide 0.02 % inhalation solution; metoprolol succinate 100 mg oral tablet extended release 24 hr; montelukast 10 mg oral tablet; ProAir HFA 90 mcg/actuation inhalation HFA aerosol inhaler; Zyrtec 10 mg Oral Tablet         Allergy List  Animal Dander; beef; Coffee; Dust Mite; Egg; Egg allergy; erythromycin; Grasses; Keflex; Molds; MSG (monosodium glutamate); Pineapple; pork; prednisone; Red Bull; Red Meats; Seasonal; TETANUS  "      Allergies Reconciled  Family Medical History  Asthma; Heart Disease; Hyperlipidemia; Congestive Heart Failure; Heart Failure; Hypertension; Diabetes, unspecified         Reproductive History   3 Para 2 0 1 0       Social History  Alcohol (Current some day); ; ; Other Substance Use (Never); Tobacco (Current every day); Vapes (Former)         Immunizations  Name Date Admin   Influenza Refused   Influenza 10/21/2014         Review of Systems  · Constitutional  o Denies  o : fever, chills, weight loss  · Cardiovascular  o Denies  o : chest pain, shortness of breath  · Gastrointestinal  o Denies  o : liver disease, heartburn, nausea, blood in stools  · Genitourinary  o Denies  o : painful urination, blood in urine  · Integument  o Denies  o : rash, itching  · Neurologic  o Denies  o : headache, weakness, loss of consciousness  · Musculoskeletal  o Denies  o : painful, swollen joints  · Psychiatric  o Denies  o : drug/alcohol addiction, anxiety, depression      Vitals  Date Time BP Position Site L\R Cuff Size HR RR TEMP (F) WT  HT  BMI kg/m2 BSA m2 O2 Sat FR L/min FiO2 HC       10/20/2020 03:51 PM         197lbs 0oz 5'  3\" 34.9 1.99             Physical Examination  · Constitutional  o Appearance  o : well developed, well-nourished, no obvious deformities present  · Head and Face  o Head  o :   § Inspection  § : normocephalic  o Face  o :   § Inspection  § : no facial lesions  · Eyes  o Conjunctivae  o : conjunctivae normal  o Sclerae  o : sclerae white  · Ears, Nose, Mouth and Throat  o Ears  o :   § External Ears  § : appearance within normal limits  § Hearing  § : intact  o Nose  o :   § External Nose  § : appearance normal  · Neck  o Inspection/Palpation  o : normal appearance  o Range of Motion  o : full range of motion  · Respiratory  o Respiratory Effort  o : breathing unlabored  o Inspection of Chest  o : normal appearance  o Auscultation of Lungs  o : no audible wheezing or " rales  · Cardiovascular  o Heart  o : regular rate  · Gastrointestinal  o Abdominal Examination  o : soft and non-tender  · Skin and Subcutaneous Tissue  o General Inspection  o : intact, no rashes  · Psychiatric  o General  o : Alert and oriented x3  o Judgement and Insight  o : judgment and insight intact  o Mood and Affect  o : mood normal, affect appropriate  · Right Shoulder  o Inspection  o : Sensation grossly intact. Neurovascular intact. Pulses normal. Tender to anterior and post shoulder , Abduction 140; ER 80, IR 70. 4+ Supraspinatus strength. Positive Piña and Neer. Pain with cross arm abduction. Skin intact. No swelling, skin discoloration or atrophy. Tender AC joint.   · Imaging  o Imaging  o : [MRI RIGHT SHOULDER] Mild to moderate supraspinatus and infraspinatus tendinopathy with a very small partial thickness interstitial tear of the anterior insertional infraspinatus tendon, involving less than 25% thickness. Small normal variant superior sublabral sulcus. No evidence of a displaced labral tear. Mild to moderate AC joint arthrosis producing mild mass effect on the supraspinatus outlet. Mild inflammation of the subacromial/subdeltoid bursa           Assessment  · AC (acromioclavicular) arthritis, right     716.91/M19.019  · Right shoulder pain, unspecified chronicity     719.41/M25.511  · Partial rotator cuff tear, right shoulder     840.4/M75.100      Plan  · Medications  o Medications have been Reconciled  o Transition of Care or Provider Policy  · Instructions  o Dr. Velez saw and examined the patient and agrees with plan.   o MRI reviewed by Dr. Velez.  o Reviewed the patient's Past Medical, Social, and Family history as well as the ROS at today's visit, no changes.  o Call or return if worsening symptoms.  o Discussed surgery.  o Risks/benefits discussed with patient including, but not limited to: infection, bleeding, neurovascular damage, malunion, nonunion, aesthetic  deformity, need for further surgery, and death.  o Surgery pamphlet given.  o Follow Up post-op.  o This note was transcribed by Honey Adam. beny  o Discussed diagnosis and treatment options with the patient. Patient wishes to proceed with Right Shoulder Arthroscopic Rotator Cuff Debridement vs Repair, Subacromial Decompression, Distal Clavicle Excision.            Electronically Signed by: Honey Adam-, Other -Author on October 22, 2020 03:08:21 PM  Electronically Co-signed by: Yang Velez MD -Reviewer on October 22, 2020 09:51:48 PM

## 2021-05-14 VITALS
WEIGHT: 185.31 LBS | DIASTOLIC BLOOD PRESSURE: 60 MMHG | HEART RATE: 76 BPM | OXYGEN SATURATION: 92 % | SYSTOLIC BLOOD PRESSURE: 112 MMHG | TEMPERATURE: 98.6 F | RESPIRATION RATE: 18 BRPM

## 2021-05-14 VITALS — HEIGHT: 63 IN | HEART RATE: 79 BPM | BODY MASS INDEX: 33.33 KG/M2 | OXYGEN SATURATION: 92 % | WEIGHT: 188.12 LBS

## 2021-05-14 VITALS — OXYGEN SATURATION: 95 % | HEIGHT: 63 IN | HEART RATE: 86 BPM | BODY MASS INDEX: 34.06 KG/M2 | WEIGHT: 192.25 LBS

## 2021-05-14 VITALS — BODY MASS INDEX: 34.06 KG/M2 | HEART RATE: 80 BPM | OXYGEN SATURATION: 96 % | HEIGHT: 63 IN | WEIGHT: 192.25 LBS

## 2021-05-14 VITALS — OXYGEN SATURATION: 94 % | WEIGHT: 200.12 LBS | BODY MASS INDEX: 35.46 KG/M2 | HEIGHT: 63 IN | HEART RATE: 73 BPM

## 2021-05-14 VITALS — WEIGHT: 192 LBS | BODY MASS INDEX: 34.02 KG/M2 | HEART RATE: 72 BPM | HEIGHT: 63 IN | OXYGEN SATURATION: 89 %

## 2021-05-14 VITALS — WEIGHT: 197 LBS | HEIGHT: 63 IN | BODY MASS INDEX: 34.91 KG/M2

## 2021-05-14 VITALS — HEART RATE: 80 BPM | HEIGHT: 63 IN | WEIGHT: 198 LBS | BODY MASS INDEX: 35.08 KG/M2 | OXYGEN SATURATION: 90 %

## 2021-05-14 NOTE — PROGRESS NOTES
Progress Note      Patient Name: Maureen Courtney   Patient ID: [id number]   Sex: Female   YOB: 1970    Primary Care Provider: Lisseth CASTANEDA    Visit Date: April 1, 2021    Provider: Brad Brannon PA-C   Location: [facility]   Location Address: [address] [place] [address]  [id number]   Location Phone: [phone]          Chief Complaint  · FOLLOW UP  · RIGHT SHOULDER PAIN       History Of Present Illness  Maureen Courtney is a 50 year old /White female who presents today to [facility]. Patient presents for follow-up evaluation of right shoulder arthroscopic rotator cuff debridement, subacromial decompression, distal clavicle resection, 11/16/2020. States she did work hardening physical therapy, she states her shoulder is doing better she states she still has some pains with reaching behind her back, reaching above her head, she has a catching sensation at her collarbone. Patient finished physical therapy yesterday, patient states she is ready go back to work full duty no restrictions, patient states since her injury back in August 2020 she has had some numbness and tingling of her right upper extremity, this was not there prior to her injury, she admits to some neck pain, patient would like this evaluated.       Past Medical History  Anxiety; Asthma; Chest pain; Diabetes Mellitus, Type II; Headache; Hepatitis, Chronic; Hypertension; Mitral Valve Disorders         Past Surgical History  Carpal tunnel release; Hysterectomy; Tubal_Ligation         Medication List  albuterol sulfate 2.5 mg /3 mL (0.083 %) inhalation solution for nebulization; Benadryl 25 mg Oral Capsule; cyclobenzaprine 10 mg oral tablet; EpiPen 0.3 mg/0.3 mL injection auto-injector; hydrochlorothiazide 25 mg oral tablet; ibuprofen 800 mg oral tablet; ipratropium bromide 0.02 % inhalation solution; metoprolol succinate 100 mg oral tablet extended release 24 hr; montelukast 10 mg oral  "tablet; ProAir HFA 90 mcg/actuation inhalation HFA aerosol inhaler; Zyrtec 10 mg Oral Tablet         Allergy List  Animal Dander; beef; Coffee; Dust Mite; Egg; Egg allergy; erythromycin; flu vaccine ts -(18 yr+); Grasses; Keflex; Molds; MSG (monosodium glutamate); Pineapple; pork; prednisone; Red Bull; Red Meats; Seasonal; Steroids; TETANUS       Allergies Reconciled  Family Medical History  Asthma; Heart Disease; Hyperlipidemia; Congestive Heart Failure; Heart Failure; Hypertension; Diabetes, unspecified         Reproductive History   3 Para 2 0 1 0       Social History  Alcohol (Current some day); ; ; Other Substance Use (Never); Tobacco (Current every day); Vapes (Former)         Immunizations  Name Date Admin   Influenza Refused   Influenza 10/21/2014         Review of Systems  · Constitutional  o Denies  o : fever, chills, weight loss  · Cardiovascular  o Denies  o : chest pain, shortness of breath  · Gastrointestinal  o Denies  o : liver disease, heartburn, nausea, blood in stools  · Genitourinary  o Denies  o : painful urination, blood in urine  · Integument  o Denies  o : rash, itching  · Neurologic  o Denies  o : headache, weakness, loss of consciousness  · Musculoskeletal  o Denies  o : painful, swollen joints  · Psychiatric  o Denies  o : drug/alcohol addiction, anxiety, depression      Vitals  Date Time BP Position Site L\R Cuff Size HR RR TEMP (F) WT  HT  BMI kg/m2 BSA m2 O2 Sat FR L/min FiO2        2021 02:21 PM      73 - R   200lbs 2oz 5'  3\" 35.45 2.01 94 %  21%          Physical Examination  · Constitutional  o Appearance  o : well developed, well-nourished, no obvious deformities present  · Head and Face  o Head  o :   § Inspection  § : normocephalic  o Face  o :   § Inspection  § : no facial lesions  · Eyes  o Conjunctivae  o : conjunctivae normal  o Sclerae  o : sclerae white  · Ears, Nose, Mouth and Throat  o Ears  o :   § External Ears  § : appearance " within normal limits  § Hearing  § : intact  o Nose  o :   § External Nose  § : appearance normal  · Neck  o Inspection/Palpation  o : normal appearance  o Range of Motion  o : full range of motion  · Respiratory  o Respiratory Effort  o : breathing unlabored  o Inspection of Chest  o : normal appearance  o Auscultation of Lungs  o : no audible wheezing or rales  · Cardiovascular  o Heart  o : regular rate  · Gastrointestinal  o Abdominal Examination  o : soft and non-tender  · Skin and Subcutaneous Tissue  o General Inspection  o : intact, no rashes  · Psychiatric  o General  o : Alert and oriented x3  o Judgement and Insight  o : judgment and insight intact  o Mood and Affect  o : mood normal, affect appropriate  · Right Shoulder  o Inspection  o : Incisions are well-healed, no erythema, ecchymosis, swelling, no signs of infection, nontender to palpation, no pain with range of motion, forward elevation 170, abduction 115 external rotation with abduction 80, internal rotation to T12, mild pain with abduction, 5 out of 5 rotator cuff strength. Sensation intact to light touch          Assessment  · Aftercare following surgery of right shoulder arthroscopic rotator cuff debridement, subacromial decompression, distal clavicle resection, 11/16/2020     V54.81  · Right shoulder pain, unspecified chronicity     719.41/M25.511  · Paresthesia of right upper extremity     782.0/R20.2      Plan  · Medications  o Medications have been Reconciled  o Transition of Care or Provider Policy  · Instructions  o Reviewed the patient's Past Medical, Social, and Family history as well as the ROS at today's visit, no changes.  o Call or return if worsening symptoms.  o Follow up after MRI.  o Discussed diagnosis and treatment options with the patient, patient was given note to return to work full duty, no restrictions, we advised her we will order MRI of the cervical spine, follow-up after MRI.            Electronically Signed by:  Brad Brannon PA-C -Author on April 1, 2021 03:19:08 PM

## 2021-05-14 NOTE — PROGRESS NOTES
Progress Note      Patient Name: Maureen Courtney   Patient ID: [id number]   Sex: Female   YOB: 1970    Primary Care Provider: Lisseth CASTANEDA    Visit Date: April 26, 2021    Provider: Brad Brannon PA-C   Location: [facility]   Location Address: [address] [place] [address]  [id number]   Location Phone: [phone]          Chief Complaint  · Right shoulder pain      History Of Present Illness  Maureen Courtney is a 50 year old /White female who presents today to [facility]. Patient presents for follow-up evaluation of right shoulder arthroscopic rotator cuff debridement, subacromial decompression, distal clavicle resection, 11/16/2020 and to review MRI of the cervical spine. Patient at last visit stated that she would like to be evaluated for right upper extremity numbness and tingling which she states has been occurring since her original injury back in August 2020, she had MRI and is here to review this. Patient at last visit was released to full duty, no restrictions at work. She states she still has some pain reaching out with abduction type movement, she has been able to tolerate work activities, she states she still has some numbness and tingling to the right upper extremity.       Past Medical History  Anxiety; Asthma; Chest pain; Diabetes Mellitus, Type II; Headache; Hepatitis, Chronic; Hypertension; Mitral Valve Disorders         Past Surgical History  Carpal tunnel release; Hysterectomy; Tubal_Ligation         Medication List  albuterol sulfate 2.5 mg /3 mL (0.083 %) inhalation solution for nebulization; Benadryl 25 mg Oral Capsule; cyclobenzaprine 10 mg oral tablet; EpiPen 0.3 mg/0.3 mL injection auto-injector; hydrochlorothiazide 25 mg oral tablet; ibuprofen 800 mg oral tablet; ipratropium bromide 0.02 % inhalation solution; metoprolol succinate 100 mg oral tablet extended release 24 hr; montelukast 10 mg oral tablet;  "ProAir HFA 90 mcg/actuation inhalation HFA aerosol inhaler; Zyrtec 10 mg Oral Tablet         Allergy List  Animal Dander; beef; Coffee; Dust Mite; Egg; Egg allergy; erythromycin; flu vaccine ts -(18 yr+); Grasses; Keflex; Molds; MSG (monosodium glutamate); Pineapple; pork; prednisone; Red Bull; Red Meats; Seasonal; Steroids; TETANUS       Allergies Reconciled  Family Medical History  Asthma; Heart Disease; Hyperlipidemia; Congestive Heart Failure; Heart Failure; Hypertension; Diabetes, unspecified         Reproductive History   3 Para 2 0 1 0       Social History  Alcohol (Current some day); ; ; Other Substance Use (Never); Tobacco (Current every day); Vapes (Former)         Immunizations  Name Date Admin   Influenza Refused   Influenza 10/21/2014         Review of Systems  · Constitutional  o Denies  o : fever, chills, weight loss  · Cardiovascular  o Denies  o : chest pain, shortness of breath  · Gastrointestinal  o Denies  o : liver disease, heartburn, nausea, blood in stools  · Genitourinary  o Denies  o : painful urination, blood in urine  · Integument  o Denies  o : rash, itching  · Neurologic  o Denies  o : headache, weakness, loss of consciousness  · Musculoskeletal  o Denies  o : painful, swollen joints  · Psychiatric  o Denies  o : drug/alcohol addiction, anxiety, depression      Vitals  Date Time BP Position Site L\R Cuff Size HR RR TEMP (F) WT  HT  BMI kg/m2 BSA m2 O2 Sat FR L/min FiO2        2021 01:35 PM      80 - R   198lbs 0oz 5'  3\" 35.07 2 90 %            Physical Examination  · Constitutional  o Appearance  o : well developed, well-nourished, no obvious deformities present  · Head and Face  o Head  o :   § Inspection  § : normocephalic  o Face  o :   § Inspection  § : no facial lesions  · Eyes  o Conjunctivae  o : conjunctivae normal  o Sclerae  o : sclerae white  · Ears, Nose, Mouth and Throat  o Ears  o :   § External Ears  § : appearance within normal " limits  § Hearing  § : intact  o Nose  o :   § External Nose  § : appearance normal  · Neck  o Inspection/Palpation  o : normal appearance  o Range of Motion  o : full range of motion  · Respiratory  o Respiratory Effort  o : breathing unlabored  o Inspection of Chest  o : normal appearance  o Auscultation of Lungs  o : no audible wheezing or rales  · Cardiovascular  o Heart  o : regular rate  · Gastrointestinal  o Abdominal Examination  o : soft and non-tender  · Skin and Subcutaneous Tissue  o General Inspection  o : intact, no rashes  · Psychiatric  o General  o : Alert and oriented x3  o Judgement and Insight  o : judgment and insight intact  o Mood and Affect  o : mood normal, affect appropriate  · Right Shoulder  o Inspection  o : Incisions are well-healed, no erythema, ecchymosis, swelling, no signs of infection, nontender to palpation, no pain with range of motion, forward elevation 170, abduction 115 external rotation with abduction 80, internal rotation to T12, mild pain with abduction, 5 out of 5 rotator cuff strength. Sensation intact to light touch   · Imaging  o Imaging  o : MRI cervical spine without contrast, 4/9/2021, impression: Cervical degenerative changes as detailed above, most significant appearing radiographically at C5-C6 with is mild cord flattening and canal stenosis with moderate left and fairly severe right foraminal impingement. Please correlate for right C6 radicular symptoms. See level by level discussion above.          Assessment  · Aftercare following surgery of right shoulder arthroscopic rotator cuff debridement, subacromial decompression, distal clavicle resection, 11/16/2020     V54.81  · Right shoulder pain, unspecified chronicity     719.41/M25.511  · Cervical spinal stenosis     723.0/M48.02      Plan  · Medications  o Medications have been Reconciled  o Transition of Care or Provider Policy  · Instructions  o Reviewed the patient's Past Medical, Social, and Family history  as well as the ROS at today's visit, no changes.  o Call or return if worsening symptoms.  o Follow up as needed.  o Patient was advised to continue activity as tolerated for her shoulder, continue home exercises, we reviewed the MRI of her cervical spine, she will be referred to neurology for further evaluation, patient states she will follow-up as needed for the shoulder.            Electronically Signed by: Brad Brannon PA-C -Author on April 26, 2021 02:11:50 PM  Electronically Co-signed by: Yang Velez MD -Reviewer on April 26, 2021 10:05:23 PM

## 2021-05-14 NOTE — PROGRESS NOTES
"   Progress Note      Patient Name: Maureen Courtney   Patient ID: [id number]   Sex: Female   YOB: 1970    Primary Care Provider: Lisseth CASTANEDA   Referring Provider: Yang Velez MD    Visit Date: February 15, 2021    Provider: Brad Brannon PA-C   Location: [facility]   Location Address: [address] [place] [address]  [id number]   Location Phone: [phone]          Chief Complaint  · Right shoulder pain      History Of Present Illness  Maureen Courtney is a 50 year old /White female who presents today to [facility]. Patient presents for follow-up evaluation of right shoulder arthroscopic rotator cuff debridement, subacromial decompression, distal clavicle resection, 11/16/2020. Patient states she is doing \"pretty good. Patient states she continues to do well with physical therapy, she has remained off of work until now, she states she has some pain with reaching behind her back and pain with abduction movements otherwise she states she is very proud of her progress with range of motion and strength. Patient has finished pain medication, takes ibuprofen as needed. Patient also takes a muscle relaxer, she states that she has worse pain is at night or after physical therapy session. No new complaints today.       Past Medical History  Anxiety; Asthma; Chest pain; Diabetes Mellitus, Type II; Headache; Hepatitis, Chronic; Hypertension; Mitral Valve Disorders         Past Surgical History  Carpal tunnel release; Hysterectomy; Tubal_Ligation         Medication List  albuterol sulfate 2.5 mg /3 mL (0.083 %) inhalation solution for nebulization; Benadryl 25 mg Oral Capsule; cyclobenzaprine 10 mg oral tablet; EpiPen 0.3 mg/0.3 mL injection auto-injector; hydrochlorothiazide 25 mg oral tablet; ibuprofen 800 mg oral tablet; ipratropium bromide 0.02 % inhalation solution; metoprolol succinate 100 mg oral tablet extended release 24 hr; " "montelukast 10 mg oral tablet; ProAir HFA 90 mcg/actuation inhalation HFA aerosol inhaler; Zyrtec 10 mg Oral Tablet         Allergy List  Animal Dander; beef; Coffee; Dust Mite; Egg; Egg allergy; erythromycin; Grasses; Keflex; Molds; MSG (monosodium glutamate); Pineapple; pork; prednisone; Red Bull; Red Meats; Seasonal; TETANUS       Allergies Reconciled  Family Medical History  Asthma; Heart Disease; Hyperlipidemia; Congestive Heart Failure; Heart Failure; Hypertension; Diabetes, unspecified         Reproductive History   3 Para 2 0 1 0       Social History  Alcohol (Current some day); ; ; Other Substance Use (Never); Tobacco (Current every day); Vapes (Former)         Immunizations  Name Date Admin   Influenza Refused   Influenza 10/21/2014         Review of Systems  · Constitutional  o Denies  o : fever, chills, weight loss  · Cardiovascular  o Denies  o : chest pain, shortness of breath  · Gastrointestinal  o Denies  o : liver disease, heartburn, nausea, blood in stools  · Genitourinary  o Denies  o : painful urination, blood in urine  · Integument  o Denies  o : rash, itching  · Neurologic  o Denies  o : headache, weakness, loss of consciousness  · Musculoskeletal  o Denies  o : painful, swollen joints  · Psychiatric  o Denies  o : drug/alcohol addiction, anxiety, depression      Vitals  Date Time BP Position Site L\R Cuff Size HR RR TEMP (F) WT  HT  BMI kg/m2 BSA m2 O2 Sat FR L/min FiO2 HC       02/15/2021 10:51 AM      86 - R   192lbs 4oz 5'  3\" 34.06 1.97 95 %            Physical Examination  · Constitutional  o Appearance  o : well developed, well-nourished, no obvious deformities present  · Head and Face  o Head  o :   § Inspection  § : normocephalic  o Face  o :   § Inspection  § : no facial lesions  · Eyes  o Conjunctivae  o : conjunctivae normal  o Sclerae  o : sclerae white  · Ears, Nose, Mouth and Throat  o Ears  o :   § External Ears  § : appearance within normal " limits  § Hearing  § : intact  o Nose  o :   § External Nose  § : appearance normal  · Neck  o Inspection/Palpation  o : normal appearance  o Range of Motion  o : full range of motion  · Respiratory  o Respiratory Effort  o : breathing unlabored  o Inspection of Chest  o : normal appearance  o Auscultation of Lungs  o : no audible wheezing or rales  · Cardiovascular  o Heart  o : regular rate  · Gastrointestinal  o Abdominal Examination  o : soft and non-tender  · Skin and Subcutaneous Tissue  o General Inspection  o : intact, no rashes  · Psychiatric  o General  o : Alert and oriented x3  o Judgement and Insight  o : judgment and insight intact  o Mood and Affect  o : mood normal, affect appropriate  · Right Shoulder  o Inspection  o : Incisions are well-healed, no erythema, no ecchymosis, no swelling. Nontender to palpation. Forward elevation 170, abduction 110, mild pain with abduction, external rotation with abduction 80, internal rotation to T12. 5 out of 5 rotator cuff strength.          Assessment  · Aftercare following surgery of right shoulder arthroscopic rotator cuff debridement, subacromial decompression, distal clavicle resection, 11/16/2020     V54.81  · Right shoulder pain, unspecified chronicity     719.41/M25.511      Plan  · Medications  o Medications have been Reconciled  o Transition of Care or Provider Policy  · Instructions  o Reviewed the patient's Past Medical, Social, and Family history as well as the ROS at today's visit, no changes.  o Call or return if worsening symptoms.  o Follow up in 6 weeks.  o Advised patient to continue physical therapy and new orders written. Patient was given work note that she can return to work, light duty, primarily one-handed work with use of her affected extremity for assistance. No lifting greater than 5 pounds. No repetitive movements. Patient was advised to begin work hardening physical therapy with her therapist, follow-up in 6 weeks for reevaluation and  possible release            Electronically Signed by: Brad Brannon PA-C -Author on February 15, 2021 11:10:48 AM  Electronically Co-signed by: Yang Velez MD -Reviewer on February 17, 2021 03:30:38 PM

## 2021-05-14 NOTE — PROGRESS NOTES
Progress Note      Patient Name: Maureen Courtney   Patient ID: [id number]   Sex: Female   YOB: 1970    Primary Care Provider: Lisseth CASTANEDA    Visit Date: January 4, 2021    Provider: Brad Brannon PA-C   Location: [facility]   Location Address: [address] [place] [address]  [id number]   Location Phone: [phone]          Chief Complaint  · Right shoulder pain      History Of Present Illness  Maureen Courtney is a 50 year old /White female who presents today to [facility]. Patient presents for follow-up evaluation of right shoulder arthroscopic rotator cuff debridement, subacromial decompression, distal clavicle resection, 11/16/2020. Patient states she has been doing well, she states she stopped her sling use about 2 weeks ago, states she still has some pain anterior lateral shoulder, she has been attending physical therapy with good results. Patient states she has pain worse at night, patient is requesting pain medication refill.       Past Medical History  Anxiety; Asthma; Chest pain; Diabetes Mellitus, Type II; Headache; Hepatitis, Chronic; Hypertension; Mitral Valve Disorders         Past Surgical History  Carpal tunnel release; Hysterectomy; Tubal_Ligation         Medication List  albuterol sulfate 2.5 mg /3 mL (0.083 %) inhalation solution for nebulization; Benadryl 25 mg Oral Capsule; cyclobenzaprine 10 mg oral tablet; EpiPen 0.3 mg/0.3 mL injection auto-injector; hydrochlorothiazide 25 mg oral tablet; ibuprofen 800 mg oral tablet; ipratropium bromide 0.02 % inhalation solution; metoprolol succinate 100 mg oral tablet extended release 24 hr; montelukast 10 mg oral tablet; Percocet 7.5-325 mg oral tablet; ProAir HFA 90 mcg/actuation inhalation HFA aerosol inhaler; Zyrtec 10 mg Oral Tablet         Allergy List  Animal Dander; beef; Coffee; Dust Mite; Egg; Egg allergy; erythromycin; Grasses; Keflex; Molds; MSG (monosodium glutamate);  "Pineapple; pork; prednisone; Red Bull; Red Meats; Seasonal; TETANUS       Allergies Reconciled  Family Medical History  Asthma; Heart Disease; Hyperlipidemia; Congestive Heart Failure; Heart Failure; Hypertension; Diabetes, unspecified         Reproductive History   3 Para 2 0 1 0       Social History  Alcohol (Current some day); ; ; Other Substance Use (Never); Tobacco (Current every day); Vapes (Former)         Immunizations  Name Date Admin   Influenza Refused   Influenza 10/21/2014         Review of Systems  · Constitutional  o Denies  o : fever, chills, weight loss  · Cardiovascular  o Denies  o : chest pain, shortness of breath  · Gastrointestinal  o Denies  o : liver disease, heartburn, nausea, blood in stools  · Genitourinary  o Denies  o : painful urination, blood in urine  · Integument  o Denies  o : rash, itching  · Neurologic  o Denies  o : headache, weakness, loss of consciousness  · Musculoskeletal  o Denies  o : painful, swollen joints  · Psychiatric  o Denies  o : drug/alcohol addiction, anxiety, depression      Vitals  Date Time BP Position Site L\R Cuff Size HR RR TEMP (F) WT  HT  BMI kg/m2 BSA m2 O2 Sat FR L/min FiO2 HC       2021 10:25 AM      79 - R   188lbs 2oz 5'  3\" 33.32 1.95 92 %            Physical Examination  · Constitutional  o Appearance  o : well developed, well-nourished, no obvious deformities present  · Head and Face  o Head  o :   § Inspection  § : normocephalic  o Face  o :   § Inspection  § : no facial lesions  · Eyes  o Conjunctivae  o : conjunctivae normal  o Sclerae  o : sclerae white  · Ears, Nose, Mouth and Throat  o Ears  o :   § External Ears  § : appearance within normal limits  § Hearing  § : intact  o Nose  o :   § External Nose  § : appearance normal  · Neck  o Inspection/Palpation  o : normal appearance  o Range of Motion  o : full range of motion  · Respiratory  o Respiratory Effort  o : breathing unlabored  o Inspection of " Chest  o : normal appearance  o Auscultation of Lungs  o : no audible wheezing or rales  · Cardiovascular  o Heart  o : regular rate  · Gastrointestinal  o Abdominal Examination  o : soft and non-tender  · Skin and Subcutaneous Tissue  o General Inspection  o : intact, no rashes  · Psychiatric  o General  o : Alert and oriented x3  o Judgement and Insight  o : judgment and insight intact  o Mood and Affect  o : mood normal, affect appropriate  · Right Shoulder  o Inspection  o : Incisions are well-healed, no erythema, ecchymosis, no swelling no signs of infection. Forward elevation 155, abduction 115, external rotation with abduction 80, internal rotation with abduction 75, mild pain with range of motion, tenderness to palpation of anterior lateral shoulder.          Assessment  · Aftercare following surgery of right shoulder arthroscopic rotator cuff debridement, subacromial decompression, distal clavicle resection, 11/16/2020     V54.81  · Right shoulder pain, unspecified chronicity     719.41/M25.511      Plan  · Medications  o Medications have been Reconciled  o Transition of Care or Provider Policy  · Instructions  o Reviewed the patient's Past Medical, Social, and Family history as well as the ROS at today's visit, no changes.  o Call or return if worsening symptoms.  o Follow up in 6 weeks.  o Advised patient to continue physical therapy, new orders written, gave a short course of pain medication refill, advised her to wean off this and to use ibuprofen as needed. Remain off of work until next visit. Follow-up in 6 weeks for reevaluation.            Electronically Signed by: Brad Brannon PA-C -Author on January 4, 2021 12:00:17 PM

## 2021-05-14 NOTE — PROGRESS NOTES
Progress Note      Patient Name: Maureen Courtney   Patient ID: [id number]   Sex: Female   YOB: 1970    Primary Care Provider: Lisseth CASTANEDA    Visit Date: February 2, 2021    Provider: LEONEL River   Location: [facility]   Location Address: [address] [address] [place]  [id number]   Location Phone: [phone]          Chief Complaint     6 month follow up       History Of Present Illness  Maureen Courtney is a 50 year old /White female who presents for evaluation and treatment of:      She is here for refills and she is doing low carb and has lost wt.  She doesn't eat breakfast.  She does not see hem/onc.  She is still smoking.  Does not want stop.  She will cough up yellow in the am not much in the afternoon.  ANGLE/Depression:  She is not having any si/hi.  DM:  She does checked her sugars and they go up and down.  She has loss wt.  HTN:She does not take her meds reg.    VIt D:  She is taking some times.  She does take the muscle relaxer as needed.  insomnia:  She did tried the ambien but fell asleep but didn't stay asleep.  She will get up doing wood working.  She will pace the floor or watch TV. no snoring.  She broke up with her boyfriend.    She has been off for work comp.       Past Medical History  Disease Name Date Onset Notes   Anxiety --  --    Asthma --  --    Chest pain --  --    Diabetes Mellitus, Type II --  --    Headache --  --    Hepatitis, Chronic --  Hepatitis A 12/21/17    Hypertension --  --    Mitral Valve Disorders --  --          Past Surgical History  Procedure Name Date Notes   Carpal tunnel release --  --    Hysterectomy 08/27/2013 --    Tubal_Ligation --  --          Medication List  Name Date Started Instructions   albuterol sulfate 2.5 mg /3 mL (0.083 %) inhalation solution for nebulization 12/09/2014 inhale 3 milliliters (2.5 mg) by nebulization route 3 times per day as needed   Benadryl 25 mg Oral Capsule   take 1 capsule (25 mg) by oral route every 6 hours as needed   cyclobenzaprine 10 mg oral tablet 12/26/2019 take 1 tablet by oral route once a day (at bedtime)   EpiPen 0.3 mg/0.3 mL injection auto-injector 12/26/2019 use as directed   hydrochlorothiazide 25 mg oral tablet 06/08/2020 take 1 tablet (25 mg) by oral route once daily   ibuprofen 800 mg oral tablet 12/26/2019 take 1 tablet by oral route 3 times a day as needed   ipratropium bromide 0.02 % inhalation solution 03/30/2020 inhale 2.5 milliliters (500 mcg) via nebulizer by inhalation route every 8 hours   metoprolol succinate 100 mg oral tablet extended release 24 hr 12/26/2019 take 1 tablet (100 mg) by oral route once daily for 30 days   montelukast 10 mg oral tablet 12/26/2019 take 1 tablet (10 mg) by oral route once daily in the evening   Norco 5-325 mg oral tablet 01/05/2021 take 1 tablet by oral route every 6 hours   Percocet 7.5-325 mg oral tablet 12/22/2020 take 1 tablet by oral route every 4 to 6 hours as needed   ProAir HFA 90 mcg/actuation inhalation HFA aerosol inhaler 12/26/2019 inhale 1 - 2 puffs by inhalation route every 6 hours as needed   Zyrtec 10 mg Oral Tablet  take 1 tablet (10 mg) by oral route once daily         Allergy List  Allergen Name Date Reaction Notes   Animal Dander --  --  --    beef --  --  --    Coffee --  --  --    Dust Mite --  --  --    Egg --  --  --    Egg allergy --  --  --    erythromycin --  --  --    Grasses --  --  --    Keflex Jun 26 2020 12:00AM hives itching --    Molds --  --  --    MSG (monosodium glutamate) --  --  --    Pineapple --  --  --    pork --  --  --    prednisone --  --  can have it in pill form only according to patient on 4/17/2020    Red Bull --  --  itching   Red Meats --  --  --    Seasonal --  --  --    TETANUS --  --  --          Family Medical History  Disease Name Relative/Age Notes   Asthma  --    Heart Disease  --    Hyperlipidemia  --    Congestive Heart Failure Sister/   sister 38 yrs  old downs syndrome   Heart Failure  --    Hypertension  --    Diabetes, unspecified  --          Reproductive History  Menstrual   Pregnancy Summary   Total Pregnancies: 3 Full Term: 2 Premature: 0   Ab Induced: 0 Ab Spontaneous: 1 Ectopics: 0   Multiples: 0 Livin         Social History  Finding Status Start/Stop Quantity Notes   Alcohol Current some day --/-- --  2016 - rarely occasion    --  --/-- --  12/3/2015 was  25 yrs    --  --/-- --  --    Other Substance Use Never --/-- --  --    Tobacco Current every day --/-- 1 pack a week    Vapes Former --/-- --  2018 - 2018 -          Immunizations  NameDate Admin Mfg Trade Name Lot Number Route Inj VIS Given VIS Publication   InfluenzaRefused 2019 NE Not Entered  NE NE     Comments:          Review of Systems  · Constitutional  o Admits  o : fatigue, weight loss  o Denies  o : fever, weight gain  · Cardiovascular  o Denies  o : lower extremity edema, claudication, chest pressure, palpitations  · Respiratory  o Admits  o : cough  o Denies  o : shortness of breath, wheezing, hemoptysis, dyspnea on exertion  · Gastrointestinal  o Denies  o : nausea, vomiting, diarrhea, constipation, abdominal pain  · Psychiatric  o Denies  o : suicidal ideation, homicidal ideation      Vitals  Date Time BP Position Site L\R Cuff Size HR RR TEMP (F) WT  HT  BMI kg/m2 BSA m2 O2 Sat FR L/min FiO2 HC       2021 10:26 /60 Sitting    76 - R 18 98.6 185lbs 5oz    92 %  21%          Physical Examination  · Constitutional  o Appearance  o : well-nourished, well developed, alert, in no acute distress  · Respiratory  o Respiratory Effort  o : breathing unlabored  o Auscultation of Lungs  o : normal breath sounds throughout  · Cardiovascular  o Heart  o :   § Auscultation of Heart  § : regular rate and rhythm, no murmurs, gallops or rubs  § Palpation of Heart  § : normal apical impulse, no cardiac thrill present  o Peripheral  Vascular System  o :   § Carotid Arteries  § : normal pulses bilaterally, no bruits present  § Pedal Pulses  § : pulses 2 bilaterally  § Extremities  § : no cyanosis, clubbing or edema; less than 2 second refill noted  · Gastrointestinal  o Abdominal Examination  o : abdomen nontender to palpation, tone normal without rigidity or guarding, no masses present, abdominal contour scaphoid  o Liver and spleen  o : no hepatomegaly present, liver nontender to palpation, spleen not palpable  · Neurologic  o Mental Status Examination  o :   § Orientation  § : grossly oriented to person, place and time  · Psychiatric  o Mood and Affect  o : mood normal, affect appropriate, denies any SI/HI          Assessment  · Generalized anxiety disorder     300.02/F41.1  · Type 2 diabetes mellitus without complication, without long-term current use of insulin     250.00/E11.9  · Essential hypertension     401.9/I10  · Fatigue     780.79/R53.83  · Insomnia, unspecified     780.52/G47.00  · Nicotine dependence     305.1/F17.200  · Obesity     278.00/E66.9  · Vitamin D deficiency     268.9/E55.9  · Screening for depression     V79.0/Z13.89  · Hemochromatosis     275.03/E83.119  · Polycythemia     238.4/D75.1      Plan  · Orders  o Iron panel (iron, TIBC, transferrin saturation) (45413, 02807, 47221) - 780.79/R53.83 - 02/02/2021  o ACO-17: Screened for tobacco use AND received tobacco cessation intervention (4004F) - 305.1/F17.200 - 02/02/2021  o ACO-18: Negative screen for clinical depression using a standardized tool () - V79.0/Z13.89 - 02/02/2021  o Free T4 (71055) - - 02/02/2021  o Hgb A1c HMH (38228) - - 02/02/2021  o Vitamin D (25-Hydroxy) Level (98255) - - 02/02/2021  o ACO-17: Screened for tobacco use AND received tobacco cessation intervention (4004F) - - 02/02/2021  o ACO-39: Current medications updated and reviewed () - - 02/02/2021  o ACO-14: Influenza immunization was not administered for reasons documented () - -  02/02/2021  o ACO-13: Fall Risk Screening with no falls in past year or only one fall without injury in the past year (1101F) - - 02/02/2021  o Physical, Primary Care Panel (CBC, CMP, Lipid, TSH) [facility] (39583, 73336, 42154, 43689) - - 02/02/2021  o Microalbumin urine (04206) - - 02/02/2021  o Vitamin B-12 (21232) - - 02/02/2021  o Folate (Folic Acid) (80827) - - 02/02/2021  · Medications  o cyclobenzaprine 10 mg oral tablet   SIG: take 1 tablet by oral route once a day (at bedtime)   DISP: (30) Tablet with 5 refills  Refilled on 02/02/2021     o hydrochlorothiazide 25 mg oral tablet   SIG: take 1 tablet (25 mg) by oral route once daily for 90 days   DISP: (90) Tablet with 1 refills  Refilled on 02/02/2021     o ibuprofen 800 mg oral tablet   SIG: take 1 tablet by oral route 3 times a day as needed   DISP: (90) Tablet with 5 refills  Refilled on 02/02/2021     o metoprolol succinate 100 mg oral tablet extended release 24 hr   SIG: take 1 tablet (100 mg) by oral route once daily for 30 days   DISP: (30) Tablet with 5 refills  Refilled on 02/02/2021     o montelukast 10 mg oral tablet   SIG: take 1 tablet (10 mg) by oral route once daily in the evening   DISP: (30) Tablet with 5 refills  Refilled on 02/02/2021     o ProAir HFA 90 mcg/actuation inhalation HFA aerosol inhaler   SIG: inhale 1 - 2 puffs by inhalation route every 6 hours as needed   DISP: (1) Inhaler with 5 refills  Refilled on 02/02/2021     o Norco 5-325 mg oral tablet   SIG: take 1 tablet by oral route every 6 hours   DISP: (20) Tablet with 0 refills  Discontinued on 02/02/2021     o Percocet 7.5-325 mg oral tablet   SIG: take 1 tablet by oral route every 4 to 6 hours as needed   DISP: (36) Tablet with 0 refills  Discontinued on 02/02/2021     o Medications have been Reconciled  o Transition of Care or Provider Policy  · Instructions  o *Form of nicotine being used: cig  o Patient was strongly encouraged to discontinue use of any nicotine containing  product or minimize the use of the product.  o Depression Screen completed and scanned into the EMR under the designated folder within the patient's documents.  o PHQ-9 result 4  o Take all medications as prescribed/directed.  o Patient was educated/instructed on their diagnosis, treatment and medications prior to discharge from the clinic today.  o Patient instructed to seek medical attention urgently for new or worsening symptoms.  o Call the office with any concerns or questions.  o We will do labs today. Call with any concerns or questions. She is aware of compliance. Discussed about the insomnia. Caution drowsiness with the muscle relaxer.  · Disposition  o Call or Return if symptoms worsen or persist.  o Return Visit Request in/on 6 months +/- 2 days (91249).            Electronically Signed by: LEONEL River -Author on February 2, 2021 10:44:54 AM

## 2021-05-15 VITALS
SYSTOLIC BLOOD PRESSURE: 149 MMHG | WEIGHT: 218.12 LBS | OXYGEN SATURATION: 91 % | DIASTOLIC BLOOD PRESSURE: 80 MMHG | HEART RATE: 79 BPM | HEIGHT: 63 IN | RESPIRATION RATE: 12 BRPM | BODY MASS INDEX: 38.65 KG/M2 | TEMPERATURE: 98.2 F

## 2021-05-15 VITALS
TEMPERATURE: 98.8 F | DIASTOLIC BLOOD PRESSURE: 75 MMHG | SYSTOLIC BLOOD PRESSURE: 153 MMHG | OXYGEN SATURATION: 96 % | WEIGHT: 218.5 LBS | RESPIRATION RATE: 16 BRPM | HEART RATE: 79 BPM

## 2021-05-15 VITALS
SYSTOLIC BLOOD PRESSURE: 147 MMHG | TEMPERATURE: 97.5 F | BODY MASS INDEX: 37.74 KG/M2 | RESPIRATION RATE: 18 BRPM | DIASTOLIC BLOOD PRESSURE: 92 MMHG | OXYGEN SATURATION: 96 % | HEART RATE: 76 BPM | HEIGHT: 63 IN | WEIGHT: 213 LBS

## 2021-05-15 VITALS
HEART RATE: 78 BPM | HEIGHT: 63 IN | DIASTOLIC BLOOD PRESSURE: 83 MMHG | SYSTOLIC BLOOD PRESSURE: 130 MMHG | BODY MASS INDEX: 37.22 KG/M2 | OXYGEN SATURATION: 96 % | DIASTOLIC BLOOD PRESSURE: 78 MMHG | SYSTOLIC BLOOD PRESSURE: 159 MMHG | WEIGHT: 210.06 LBS | TEMPERATURE: 98.2 F | RESPIRATION RATE: 12 BRPM

## 2021-05-15 VITALS
BODY MASS INDEX: 38.82 KG/M2 | WEIGHT: 219.12 LBS | OXYGEN SATURATION: 94 % | HEART RATE: 79 BPM | HEIGHT: 63 IN | SYSTOLIC BLOOD PRESSURE: 153 MMHG | TEMPERATURE: 98 F | RESPIRATION RATE: 12 BRPM | DIASTOLIC BLOOD PRESSURE: 73 MMHG

## 2021-05-15 VITALS
HEIGHT: 63 IN | BODY MASS INDEX: 37.44 KG/M2 | RESPIRATION RATE: 12 BRPM | DIASTOLIC BLOOD PRESSURE: 74 MMHG | SYSTOLIC BLOOD PRESSURE: 151 MMHG | HEART RATE: 75 BPM | TEMPERATURE: 98 F | WEIGHT: 211.31 LBS | OXYGEN SATURATION: 95 %

## 2021-05-15 VITALS
WEIGHT: 215.19 LBS | HEIGHT: 63 IN | RESPIRATION RATE: 16 BRPM | SYSTOLIC BLOOD PRESSURE: 144 MMHG | HEART RATE: 80 BPM | BODY MASS INDEX: 38.13 KG/M2 | OXYGEN SATURATION: 92 % | DIASTOLIC BLOOD PRESSURE: 73 MMHG

## 2021-05-16 VITALS
HEIGHT: 63 IN | DIASTOLIC BLOOD PRESSURE: 77 MMHG | SYSTOLIC BLOOD PRESSURE: 126 MMHG | OXYGEN SATURATION: 93 % | WEIGHT: 234.31 LBS | TEMPERATURE: 97.9 F | HEART RATE: 78 BPM | BODY MASS INDEX: 41.52 KG/M2 | RESPIRATION RATE: 12 BRPM

## 2021-05-16 VITALS
OXYGEN SATURATION: 95 % | BODY MASS INDEX: 40.94 KG/M2 | HEART RATE: 66 BPM | SYSTOLIC BLOOD PRESSURE: 157 MMHG | RESPIRATION RATE: 16 BRPM | OXYGEN SATURATION: 97 % | HEART RATE: 82 BPM | TEMPERATURE: 98.2 F | HEIGHT: 63 IN | WEIGHT: 231.06 LBS | DIASTOLIC BLOOD PRESSURE: 82 MMHG

## 2021-05-16 VITALS
WEIGHT: 223.37 LBS | RESPIRATION RATE: 12 BRPM | DIASTOLIC BLOOD PRESSURE: 87 MMHG | TEMPERATURE: 97.5 F | OXYGEN SATURATION: 94 % | SYSTOLIC BLOOD PRESSURE: 150 MMHG | HEART RATE: 74 BPM | BODY MASS INDEX: 39.58 KG/M2 | HEIGHT: 63 IN

## 2021-07-16 ENCOUNTER — OFFICE VISIT (OUTPATIENT)
Dept: FAMILY MEDICINE CLINIC | Facility: CLINIC | Age: 51
End: 2021-07-16

## 2021-07-16 VITALS
OXYGEN SATURATION: 92 % | DIASTOLIC BLOOD PRESSURE: 74 MMHG | SYSTOLIC BLOOD PRESSURE: 137 MMHG | WEIGHT: 186.7 LBS | TEMPERATURE: 96.9 F | HEIGHT: 63 IN | BODY MASS INDEX: 33.08 KG/M2 | RESPIRATION RATE: 18 BRPM | HEART RATE: 77 BPM

## 2021-07-16 DIAGNOSIS — R19.7 DIARRHEA, UNSPECIFIED TYPE: ICD-10-CM

## 2021-07-16 DIAGNOSIS — N30.00 ACUTE CYSTITIS WITHOUT HEMATURIA: Primary | ICD-10-CM

## 2021-07-16 DIAGNOSIS — R11.0 NAUSEA WITHOUT VOMITING: ICD-10-CM

## 2021-07-16 PROBLEM — I10 HYPERTENSION: Status: ACTIVE | Noted: 2021-07-16

## 2021-07-16 PROBLEM — E11.9 DIABETES MELLITUS, TYPE II (HCC): Status: ACTIVE | Noted: 2021-07-16

## 2021-07-16 PROBLEM — F41.9 ANXIETY: Status: ACTIVE | Noted: 2021-07-16

## 2021-07-16 PROBLEM — K73.9 HEPATITIS, CHRONIC: Status: ACTIVE | Noted: 2021-07-16

## 2021-07-16 PROBLEM — J45.909 ASTHMA: Status: ACTIVE | Noted: 2021-07-16

## 2021-07-16 LAB
BILIRUB BLD-MCNC: NEGATIVE MG/DL
CLARITY, POC: CLEAR
COLOR UR: YELLOW
GLUCOSE UR STRIP-MCNC: NEGATIVE MG/DL
KETONES UR QL: NEGATIVE
LEUKOCYTE EST, POC: ABNORMAL
NITRITE UR-MCNC: POSITIVE MG/ML
PH UR: 6.5 [PH] (ref 5–8)
PROT UR STRIP-MCNC: NEGATIVE MG/DL
RBC # UR STRIP: ABNORMAL /UL
SP GR UR: 1.02 (ref 1–1.03)
UROBILINOGEN UR QL: NORMAL

## 2021-07-16 PROCEDURE — 81003 URINALYSIS AUTO W/O SCOPE: CPT | Performed by: NURSE PRACTITIONER

## 2021-07-16 PROCEDURE — 87077 CULTURE AEROBIC IDENTIFY: CPT | Performed by: NURSE PRACTITIONER

## 2021-07-16 PROCEDURE — 87186 SC STD MICRODIL/AGAR DIL: CPT | Performed by: NURSE PRACTITIONER

## 2021-07-16 PROCEDURE — 99213 OFFICE O/P EST LOW 20 MIN: CPT | Performed by: NURSE PRACTITIONER

## 2021-07-16 PROCEDURE — 87086 URINE CULTURE/COLONY COUNT: CPT | Performed by: NURSE PRACTITIONER

## 2021-07-16 RX ORDER — HYDROCHLOROTHIAZIDE 25 MG/1
25 TABLET ORAL DAILY
COMMUNITY
End: 2021-12-06 | Stop reason: SDUPTHER

## 2021-07-16 RX ORDER — ALBUTEROL SULFATE 2.5 MG/3ML
2.5 SOLUTION RESPIRATORY (INHALATION) EVERY 4 HOURS PRN
COMMUNITY

## 2021-07-16 RX ORDER — SULFAMETHOXAZOLE AND TRIMETHOPRIM 800; 160 MG/1; MG/1
1 TABLET ORAL 2 TIMES DAILY
Qty: 20 TABLET | Refills: 0 | Status: SHIPPED | OUTPATIENT
Start: 2021-07-16 | End: 2021-12-06

## 2021-07-16 RX ORDER — IBUPROFEN 800 MG/1
800 TABLET ORAL EVERY 6 HOURS PRN
COMMUNITY
End: 2021-12-06 | Stop reason: SDUPTHER

## 2021-07-16 RX ORDER — MONTELUKAST SODIUM 10 MG/1
10 TABLET ORAL NIGHTLY
COMMUNITY
End: 2021-12-06 | Stop reason: SDUPTHER

## 2021-07-16 RX ORDER — DICYCLOMINE HYDROCHLORIDE 10 MG/1
10 CAPSULE ORAL
Qty: 90 CAPSULE | Refills: 0 | Status: SHIPPED | OUTPATIENT
Start: 2021-07-16 | End: 2022-01-24

## 2021-07-16 RX ORDER — METOPROLOL SUCCINATE 100 MG/1
100 TABLET, EXTENDED RELEASE ORAL DAILY
COMMUNITY
End: 2021-12-06 | Stop reason: SDUPTHER

## 2021-07-16 RX ORDER — CYCLOBENZAPRINE HCL 10 MG
10 TABLET ORAL 3 TIMES DAILY PRN
COMMUNITY
End: 2021-12-06 | Stop reason: SDUPTHER

## 2021-07-16 RX ORDER — ALBUTEROL SULFATE 90 UG/1
2 AEROSOL, METERED RESPIRATORY (INHALATION) EVERY 4 HOURS PRN
COMMUNITY
End: 2022-02-28 | Stop reason: SDUPTHER

## 2021-07-16 NOTE — PROGRESS NOTES
Chief Complaint  Diarrhea, Nausea, and Urinary Tract Infection    Subjective       History of Present Illness  Maureen Courtney presents to [facility]  Started with diarrhea about 10 days ago, she went to urgent care 2 days ago and was ordered C. difficile and stool tests and she has not yet completed those.  She says the diarrhea has gotten better.    Increased urinary frequency and burning, symptoms started Wednesday.  Denies fever.    Past Medical History:   • Anxiety   • Asthma   • Chest pain   • Chronic hepatitis (CMS/HCC)    Hepatitis A    • Diabetes mellitus (CMS/HCC)   • DM2 (diabetes mellitus, type 2) (CMS/HCC)   • Headache   • Hypertension   • Mitral valve disorder   • Polycythemia       Allergies  Beef allergy, Eggs or egg-derived products, Flu virus vaccine, Pork allergy, Tetanus toxoid, Cephalexin, Coffee, Dust mite extract, Egg phospholipids, Erythromycin, Mixed grasses, Molds & smuts, Msg [monosodium glutamate], Other, Pineapple, and Prednisone    Past Surgical History:   • CARPAL TUNNEL RELEASE   • HYSTERECTOMY   • ROTATOR CUFF REPAIR   • TUBAL ABDOMINAL LIGATION       Social History     Tobacco Use   • Smoking status: Current Every Day Smoker     Packs/day: 1.00     Years: 40.00     Pack years: 40.00     Types: Cigarettes   • Smokeless tobacco: Never Used   Vaping Use   • Vaping Use: Former   • Quit date: 6/19/2018   • Substances: Nicotine   • Devices: Pre-filled or refillable cartridge   Substance Use Topics   • Alcohol use: Yes     Comment: Occasionally   • Drug use: Never       Family History   Problem Relation Age of Onset   • Hypertension Mother    • Thyroid disease Mother    • Hyperlipidemia Mother    • Osteoarthritis Mother    • Heart disease Mother    • Diabetes Father    • Stroke Father    • Skin cancer Father    • Heart disease Maternal Grandfather    • Cancer Other    • Asthma Other    • Heart disease Other    • Hyperlipidemia Other    • Heart failure Other     • Hypertension Other    • Diabetes Other         Unspecified   • Heart failure Sister 38        Downs Syndrome        Health Maintenance Due   Topic Date Due   • COLORECTAL CANCER SCREENING  Never done   • ANNUAL PHYSICAL  Never done   • Pneumococcal Vaccine 0-64 (1 of 1 - PPSV23) Never done   • COVID-19 Vaccine (1) Never done   • Hepatitis B (1 of 3 - Risk 3-dose series) Never done   • TDAP/TD VACCINES (1 - Tdap) Never done   • HEPATITIS C SCREENING  Never done   • DIABETIC FOOT EXAM  Never done   • DIABETIC EYE EXAM  Never done          Current Outpatient Medications:   •  albuterol (PROVENTIL) (2.5 MG/3ML) 0.083% nebulizer solution, Take 2.5 mg by nebulization Every 4 (Four) Hours As Needed for Wheezing., Disp: , Rfl:   •  albuterol sulfate  (90 Base) MCG/ACT inhaler, Inhale 2 puffs Every 4 (Four) Hours As Needed for Wheezing., Disp: , Rfl:   •  cetirizine (ZyrTEC Allergy) 10 MG tablet, Zyrtec 10 mg oral tablet take 1 tablet (10 mg) by oral route once daily   Active, Disp: , Rfl:   •  cyclobenzaprine (FLEXERIL) 10 MG tablet, Take 10 mg by mouth 3 (Three) Times a Day As Needed for Muscle Spasms., Disp: , Rfl:   •  diphenhydrAMINE (Benadryl Allergy) 25 mg capsule, Benadryl 25 mg oral capsule take 1 capsule (25 mg) by oral route every 6 hours as needed   Active, Disp: , Rfl:   •  EPINEPHrine (EpiPen 2-Alessandro) 0.3 MG/0.3ML solution auto-injector injection, EpiPen 0.3 mg/0.3 mL injection auto-injector inject 0.3 mg by intramuscular route once as needed for anaphylaxis   Suspended, Disp: , Rfl:   •  hydroCHLOROthiazide (HYDRODIURIL) 25 MG tablet, Take 25 mg by mouth Daily., Disp: , Rfl:   •  ibuprofen (ADVIL,MOTRIN) 800 MG tablet, Take 800 mg by mouth Every 6 (Six) Hours As Needed for Mild Pain ., Disp: , Rfl:   •  ipratropium (ATROVENT) 0.02 % nebulizer solution, Take 500 mcg by nebulization 4 (Four) Times a Day., Disp: , Rfl:   •  metoprolol succinate XL (TOPROL-XL) 100 MG 24 hr tablet, Take 100 mg by mouth  Daily., Disp: , Rfl:   •  montelukast (SINGULAIR) 10 MG tablet, Take 10 mg by mouth Every Night., Disp: , Rfl:   •  promethazine (PHENERGAN) 25 MG tablet, Take 1 tablet by mouth Every 6 (Six) Hours As Needed for Nausea., Disp: 12 tablet, Rfl: 0  •  dicyclomine (Bentyl) 10 MG capsule, Take 1 capsule by mouth 4 (Four) Times a Day Before Meals & at Bedtime., Disp: 90 capsule, Rfl: 0  •  sulfamethoxazole-trimethoprim (Bactrim DS) 800-160 MG per tablet, Take 1 tablet by mouth 2 (Two) Times a Day., Disp: 20 tablet, Rfl: 0    Immunization History   Administered Date(s) Administered   • Influenza TIV (IM) 10/21/2014   • Influenza, Unspecified 10/21/2014       Objective     Vitals:    07/16/21 0829   BP: 137/74   Pulse: 77   Resp: 18   Temp: 96.9 °F (36.1 °C)   SpO2: 92%     Body mass index is 33.07 kg/m².     Physical Exam    Vital signs reviewed    Alert normal appearance in no distress  heart rate and rhythm are regular without murmur normal pulses   breath sounds are clear and no respiratory distress   abdomen soft nondistended nontender positive bowel sounds no suprapubic or CVA tenderness. Skin is warm and dry  oriented x3    Result Review :    The following data was reviewed by: LEONEL Villegas on 07/16/2021:    Common labs    Common Labsle 7/31/20 7/31/20 11/16/20 2/2/21 2/2/21    1644 1644  1928 1928   Glucose  97 93 98    BUN  15 9 14    Creatinine  0.60 0.46 (A) 0.64    Sodium  140 140 142    Potassium  3.7 4.3 4.6    Chloride  100 103 102    Calcium  10.1 9.3 9.6    Albumin  4.2  4.1    Total Bilirubin  0.33  0.31    Alkaline Phosphatase  80  79    AST (SGOT)  24  17    ALT (SGPT)  29  16    WBC 8.29   9.78    Hemoglobin 15.1   17.5 (A)    Hematocrit 45.4   52.1 (A)    Platelets 136   150    Total Cholesterol    111    Triglycerides    84    HDL Cholesterol    40    LDL Cholesterol     54 (A)    Hemoglobin A1C     5.4   (A) Abnormal value       Comments are available for some flowsheets but are not  being displayed.             Data reviewed: Urgent care notes from 7/14/21              Assessment and Plan     Diagnoses and all orders for this visit:    1. Acute cystitis without hematuria (Primary)  -     POCT urinalysis dipstick, automated  -     Urine Culture - Urine, Urine, Clean Catch  -     sulfamethoxazole-trimethoprim (Bactrim DS) 800-160 MG per tablet; Take 1 tablet by mouth 2 (Two) Times a Day.  Dispense: 20 tablet; Refill: 0    2. Nausea without vomiting    3. Diarrhea, unspecified type  -     dicyclomine (Bentyl) 10 MG capsule; Take 1 capsule by mouth 4 (Four) Times a Day Before Meals & at Bedtime.  Dispense: 90 capsule; Refill: 0    To ER with worsening in symptoms, will treat with Bactrim and dicyclomine when she has brought the stool test back to the hospital.  Call with concerns        Follow Up     No follow-ups on file.    Patient was given instructions and counseling regarding her condition or for health maintenance advice. Please see specific information pulled into the AVS if appropriate.     Maureen OROZCO Sherwin  reports that she has been smoking cigarettes. She has a 40.00 pack-year smoking history. She has never used smokeless tobacco.. I have educated her on the risk of diseases from using tobacco products such as cancer, COPD and heart disease.     I advised her to quit and she is not willing to quit.    I spent 3  minutes counseling the patient.           Julia Bronson, LEONEL

## 2021-07-18 LAB — BACTERIA SPEC AEROBE CULT: ABNORMAL

## 2021-08-04 PROCEDURE — U0003 INFECTIOUS AGENT DETECTION BY NUCLEIC ACID (DNA OR RNA); SEVERE ACUTE RESPIRATORY SYNDROME CORONAVIRUS 2 (SARS-COV-2) (CORONAVIRUS DISEASE [COVID-19]), AMPLIFIED PROBE TECHNIQUE, MAKING USE OF HIGH THROUGHPUT TECHNOLOGIES AS DESCRIBED BY CMS-2020-01-R: HCPCS | Performed by: NURSE PRACTITIONER

## 2021-08-05 ENCOUNTER — TELEPHONE (OUTPATIENT)
Dept: URGENT CARE | Facility: CLINIC | Age: 51
End: 2021-08-05

## 2021-08-05 NOTE — TELEPHONE ENCOUNTER
----- Message from LEONEL Duque sent at 8/5/2021 10:16 AM EDT -----  Please notify patient of negative Covid result.

## 2021-08-31 ENCOUNTER — OFFICE VISIT (OUTPATIENT)
Dept: FAMILY MEDICINE CLINIC | Facility: CLINIC | Age: 51
End: 2021-08-31

## 2021-08-31 VITALS
TEMPERATURE: 97.3 F | DIASTOLIC BLOOD PRESSURE: 70 MMHG | SYSTOLIC BLOOD PRESSURE: 135 MMHG | HEIGHT: 63 IN | BODY MASS INDEX: 33.95 KG/M2 | WEIGHT: 191.6 LBS | OXYGEN SATURATION: 96 % | HEART RATE: 65 BPM

## 2021-08-31 DIAGNOSIS — G43.919 INTRACTABLE MIGRAINE WITHOUT STATUS MIGRAINOSUS, UNSPECIFIED MIGRAINE TYPE: Primary | ICD-10-CM

## 2021-08-31 DIAGNOSIS — L67.8 ABNORMAL FACIAL HAIR: ICD-10-CM

## 2021-08-31 PROCEDURE — 99213 OFFICE O/P EST LOW 20 MIN: CPT | Performed by: NURSE PRACTITIONER

## 2021-08-31 RX ORDER — SUMATRIPTAN 50 MG/1
TABLET, FILM COATED ORAL
Qty: 12 TABLET | Refills: 1 | Status: SHIPPED | OUTPATIENT
Start: 2021-08-31 | End: 2021-12-06 | Stop reason: SDUPTHER

## 2021-08-31 RX ORDER — RIMEGEPANT SULFATE 75 MG/75MG
75 TABLET, ORALLY DISINTEGRATING ORAL DAILY PRN
Qty: 30 TABLET | Refills: 1 | Status: SHIPPED | OUTPATIENT
Start: 2021-08-31 | End: 2022-02-28 | Stop reason: SDUPTHER

## 2021-08-31 NOTE — PROGRESS NOTES
Chief Complaint  Headache ( Migraines getting worse last several weeks)    Subjective          Maureen Courtney presents to [facility]  History of Present Illness    She has been having a headache for little bit longer than 6 months.  She has been sick for last months with stomach virus, flu which then led to pneumonia.  She has been taking some muscle relaxers to help with her headaches.  She also found an old prescription of BuSpar and she has been taking that with ibuprofen that has helped some with the headache but she is still very light sensitive nausea and just not feeling good overall.  She requests a work note for the remainder of this week.  She used to take Topamax in the past which did help with her headaches but the side effects of memory loss was too much and she does not want to take that again.  She has an appointment with neurology on October 14 per patient.  She is willing to try any medications currently.  No dizziness lightheadedness noted.    Past Medical History:   • Anxiety   • Asthma   • Chest pain   • Chronic hepatitis (CMS/HCC)    Hepatitis A    • Diabetes mellitus (CMS/HCC)   • DM2 (diabetes mellitus, type 2) (CMS/HCC)   • Headache   • Hypertension   • Mitral valve disorder   • Polycythemia       Allergies  Beef allergy, Eggs or egg-derived products, Flu virus vaccine, Pork allergy, Tetanus toxoid, Cephalexin, Coffee, Dust mite extract, Egg phospholipids, Erythromycin, Mixed grasses, Molds & smuts, Msg [monosodium glutamate], Pineapple, and Prednisone    Past Surgical History:   • CARPAL TUNNEL RELEASE   • HYSTERECTOMY   • ROTATOR CUFF REPAIR   • TUBAL ABDOMINAL LIGATION       Social History     Tobacco Use   • Smoking status: Current Every Day Smoker     Packs/day: 1.00     Years: 40.00     Pack years: 40.00     Types: Cigarettes   • Smokeless tobacco: Never Used   Vaping Use   • Vaping Use: Former   • Quit date: 6/19/2018   • Substances: Nicotine   •  Devices: Pre-filled or refillable cartridge   Substance Use Topics   • Alcohol use: Yes     Comment: Occasionally   • Drug use: Never       Family History   Problem Relation Age of Onset   • Hypertension Mother    • Thyroid disease Mother    • Hyperlipidemia Mother    • Osteoarthritis Mother    • Heart disease Mother    • Diabetes Father    • Stroke Father    • Skin cancer Father    • Heart disease Maternal Grandfather    • Cancer Other    • Asthma Other    • Heart disease Other    • Hyperlipidemia Other    • Heart failure Other    • Hypertension Other    • Diabetes Other         Unspecified   • Heart failure Sister 38        Downs Syndrome        Health Maintenance Due   Topic Date Due   • COLORECTAL CANCER SCREENING  Never done   • ANNUAL PHYSICAL  Never done   • Pneumococcal Vaccine 0-64 (1 of 2 - PPSV23) Never done   • COVID-19 Vaccine (1) Never done   • Hepatitis B (1 of 3 - Risk 3-dose series) Never done   • TDAP/TD VACCINES (1 - Tdap) Never done   • HEPATITIS C SCREENING  Never done   • DIABETIC FOOT EXAM  Never done   • DIABETIC EYE EXAM  Never done   • HEMOGLOBIN A1C  08/02/2021          Current Outpatient Medications:   •  albuterol (PROVENTIL) (2.5 MG/3ML) 0.083% nebulizer solution, Take 2.5 mg by nebulization Every 4 (Four) Hours As Needed for Wheezing., Disp: , Rfl:   •  albuterol sulfate  (90 Base) MCG/ACT inhaler, Inhale 2 puffs Every 4 (Four) Hours As Needed for Wheezing., Disp: , Rfl:   •  cetirizine (ZyrTEC Allergy) 10 MG tablet, Zyrtec 10 mg oral tablet take 1 tablet (10 mg) by oral route once daily   Active, Disp: , Rfl:   •  cyclobenzaprine (FLEXERIL) 10 MG tablet, Take 10 mg by mouth 3 (Three) Times a Day As Needed for Muscle Spasms., Disp: , Rfl:   •  dicyclomine (Bentyl) 10 MG capsule, Take 1 capsule by mouth 4 (Four) Times a Day Before Meals & at Bedtime., Disp: 90 capsule, Rfl: 0  •  diphenhydrAMINE (Benadryl Allergy) 25 mg capsule, Benadryl 25 mg oral capsule take 1 capsule (25 mg)  by oral route every 6 hours as needed   Active, Disp: , Rfl:   •  EPINEPHrine (EpiPen 2-Alessandro) 0.3 MG/0.3ML solution auto-injector injection, EpiPen 0.3 mg/0.3 mL injection auto-injector inject 0.3 mg by intramuscular route once as needed for anaphylaxis   Suspended, Disp: , Rfl:   •  hydroCHLOROthiazide (HYDRODIURIL) 25 MG tablet, Take 25 mg by mouth Daily., Disp: , Rfl:   •  ibuprofen (ADVIL,MOTRIN) 800 MG tablet, Take 800 mg by mouth Every 6 (Six) Hours As Needed for Mild Pain ., Disp: , Rfl:   •  ipratropium (ATROVENT) 0.02 % nebulizer solution, Take 500 mcg by nebulization 4 (Four) Times a Day., Disp: , Rfl:   •  metoprolol succinate XL (TOPROL-XL) 100 MG 24 hr tablet, Take 100 mg by mouth Daily., Disp: , Rfl:   •  montelukast (SINGULAIR) 10 MG tablet, Take 10 mg by mouth Every Night., Disp: , Rfl:   •  Eflornithine HCl 13.9 % cream, Apply 1 application topically to the appropriate area as directed Daily for 30 days., Disp: 60 g, Rfl: 0  •  promethazine (PHENERGAN) 25 MG tablet, Take 1 tablet by mouth Every 6 (Six) Hours As Needed for Nausea., Disp: 12 tablet, Rfl: 0  •  promethazine-dextromethorphan (PROMETHAZINE-DM) 6.25-15 MG/5ML syrup, Take 5 mL by mouth 4 (Four) Times a Day As Needed for Cough., Disp: 120 mL, Rfl: 0  •  Rimegepant Sulfate (Nurtec) 75 MG tablet dispersible tablet, Take 1 tablet by mouth Daily As Needed (headache)., Disp: 30 tablet, Rfl: 1  •  sulfamethoxazole-trimethoprim (Bactrim DS) 800-160 MG per tablet, Take 1 tablet by mouth 2 (Two) Times a Day., Disp: 20 tablet, Rfl: 0  •  SUMAtriptan (Imitrex) 50 MG tablet, Take one tablet at onset of headache. May repeat dose one time in 2 hours if headache not relieved., Disp: 12 tablet, Rfl: 1    There are no discontinued medications.    Immunization History   Administered Date(s) Administered   • Influenza TIV (IM) 10/21/2014   • Influenza, Unspecified 10/21/2014       Review of Systems   Constitutional: Negative for fever.   Gastrointestinal:  Positive for nausea. Negative for diarrhea and vomiting.   Neurological: Positive for headache. Negative for dizziness.   Psychiatric/Behavioral: Positive for stress. Negative for hallucinations and suicidal ideas.        Objective       Vitals:    08/31/21 1018   BP: 135/70   Pulse: 65   Temp: 97.3 °F (36.3 °C)   SpO2: 96%     Body mass index is 33.94 kg/m².         Physical Exam  Vitals reviewed.   Constitutional:       Appearance: Normal appearance. She is well-developed.   HENT:      Right Ear: Tympanic membrane normal.      Left Ear: Tympanic membrane normal.   Cardiovascular:      Rate and Rhythm: Normal rate and regular rhythm.      Heart sounds: Normal heart sounds. No murmur heard.     Pulmonary:      Effort: Pulmonary effort is normal.      Breath sounds: Normal breath sounds.   Neurological:      Mental Status: She is alert and oriented to person, place, and time.      Cranial Nerves: No cranial nerve deficit.      Motor: No weakness.   Psychiatric:         Mood and Affect: Mood and affect normal.             Result Review :     The following data was reviewed by: LEONEL River on 08/31/2021:                     Assessment and Plan      Diagnoses and all orders for this visit:    1. Intractable migraine without status migrainosus, unspecified migraine type (Primary)  -     Rimegepant Sulfate (Nurtec) 75 MG tablet dispersible tablet; Take 1 tablet by mouth Daily As Needed (headache).  Dispense: 30 tablet; Refill: 1  -     SUMAtriptan (Imitrex) 50 MG tablet; Take one tablet at onset of headache. May repeat dose one time in 2 hours if headache not relieved.  Dispense: 12 tablet; Refill: 1    2. Abnormal facial hair  -     Eflornithine HCl 13.9 % cream; Apply 1 application topically to the appropriate area as directed Daily for 30 days.  Dispense: 60 g; Refill: 0            Follow Up     No follow-ups on file.  We will add nurtec and when the headache start take the med then follow with imitrex  in 1 hr after the start of the headache.  If needed we will refer to neurology for further mgt if needed.  She can't tolerate Topamax.  Patient was given instructions and counseling regarding her condition or for health maintenance advice. Please see specific information pulled into the AVS if appropriate.   She will trial the peroxide in the left ear.  She will do the cream daily for the facial hair--she has been on it before.  Maureen Courtney  reports that she has been smoking cigarettes. She has a 40.00 pack-year smoking history. She has never used smokeless tobacco.. I have educated her on the risk of diseases from using tobacco products such as cancer, COPD and heart disease.     I advised her to quit and she is not willing to quit.  Lisseth Viveros, APRN

## 2021-09-01 ENCOUNTER — TELEPHONE (OUTPATIENT)
Dept: FAMILY MEDICINE CLINIC | Facility: CLINIC | Age: 51
End: 2021-09-01

## 2021-09-24 ENCOUNTER — OFFICE VISIT (OUTPATIENT)
Dept: NEUROSURGERY | Facility: CLINIC | Age: 51
End: 2021-09-24

## 2021-09-24 VITALS
HEIGHT: 63 IN | SYSTOLIC BLOOD PRESSURE: 123 MMHG | BODY MASS INDEX: 34.21 KG/M2 | DIASTOLIC BLOOD PRESSURE: 79 MMHG | WEIGHT: 193.1 LBS

## 2021-09-24 DIAGNOSIS — M48.02 FORAMINAL STENOSIS OF CERVICAL REGION: Primary | ICD-10-CM

## 2021-09-24 PROCEDURE — 99203 OFFICE O/P NEW LOW 30 MIN: CPT | Performed by: NEUROLOGICAL SURGERY

## 2021-09-24 NOTE — PROGRESS NOTES
"Chief Complaint  Neck Pain    Subjective          Maureen Courtney who is a 50 y.o. year old female who presents to [facility] for Evaluation of the Spine.     The patient complains of pain located in the Cervical Spine.  Patients states the pain has been present for 2 years. The pain came on gradually.  The pain scale level is 7-8/10.  The pain radiates into the right arm in C7 distribution.  The pain is constant and described as burning.  The pain is worse at no particular time of day. Patient states nothing improves the pain.  Patient states nothing worsens the pain.    Associated Symptoms Include: Numbness into the right hand  Conservative Interventions Include: NSAIDs that were slightly effective. Muscle Relaxants that were-slightly effective, but can only take at night.    Was this the result of an injury or accident?: No    History of Previous Spinal Surgery?: No    She reports that she has been smoking cigarettes. She has a 40.00 pack-year smoking history. She has never used smokeless tobacco.    Review of Systems   Musculoskeletal: Positive for arthralgias, myalgias, neck pain and neck stiffness.   Neurological: Positive for numbness.        Objective   Vital Signs:   /79 (BP Location: Left arm, Patient Position: Sitting)   Ht 160 cm (63\")   Wt 87.6 kg (193 lb 1.6 oz)   BMI 34.21 kg/m²       Physical Exam  Constitutional:       Appearance: She is obese.   Neck:      Comments: Decreased ROM in all directions, left worse  Cardiovascular:      Comments: No edema  Pulmonary:      Effort: Pulmonary effort is normal.   Musculoskeletal:      Comments: Mild TTP at    Neurological:      Mental Status: She is alert.      Sensory: No sensory deficit.      Motor: No weakness.      Deep Tendon Reflexes: Reflexes normal (-Hoffmans).   Psychiatric:         Mood and Affect: Mood normal.          Result Review :   I personally reviewed the patient's MRI scan which shows " foraminal stenosis at C5-6 (right greater than left).       Assessment and Plan    Diagnoses and all orders for this visit:    1. Foraminal stenosis of cervical region (Primary)      We will try a course of PT. If not helping she could consider an ACDF.     We discussed the importance of smoking/nicotine cessation. Smoking/nicotine use has multiple health risks. In particular related to the spine, nicotine increases the incidence of lower back pain, speeds up the progression of degenerative disc disease and dramatically reduces healing after spine surgery (particularly a fusion operation).     Follow Up   No follow-ups on file.  Patient was given instructions and counseling regarding her condition or for health maintenance advice. Please see specific information pulled into the AVS if appropriate.

## 2021-10-04 PROCEDURE — 87635 SARS-COV-2 COVID-19 AMP PRB: CPT | Performed by: FAMILY MEDICINE

## 2021-10-05 ENCOUNTER — TELEPHONE (OUTPATIENT)
Dept: URGENT CARE | Facility: CLINIC | Age: 51
End: 2021-10-05

## 2021-12-06 ENCOUNTER — OFFICE VISIT (OUTPATIENT)
Dept: FAMILY MEDICINE CLINIC | Facility: CLINIC | Age: 51
End: 2021-12-06

## 2021-12-06 VITALS
WEIGHT: 194.3 LBS | RESPIRATION RATE: 16 BRPM | DIASTOLIC BLOOD PRESSURE: 78 MMHG | HEIGHT: 63 IN | SYSTOLIC BLOOD PRESSURE: 140 MMHG | HEART RATE: 72 BPM | BODY MASS INDEX: 34.43 KG/M2 | TEMPERATURE: 97.9 F | OXYGEN SATURATION: 96 %

## 2021-12-06 DIAGNOSIS — E11.9 TYPE 2 DIABETES MELLITUS WITHOUT COMPLICATION, WITHOUT LONG-TERM CURRENT USE OF INSULIN (HCC): Primary | ICD-10-CM

## 2021-12-06 DIAGNOSIS — I10 PRIMARY HYPERTENSION: ICD-10-CM

## 2021-12-06 DIAGNOSIS — M54.50 CHRONIC BILATERAL LOW BACK PAIN WITHOUT SCIATICA: ICD-10-CM

## 2021-12-06 DIAGNOSIS — J45.20 MILD INTERMITTENT ASTHMA WITHOUT COMPLICATION: ICD-10-CM

## 2021-12-06 DIAGNOSIS — G89.29 CHRONIC BILATERAL LOW BACK PAIN WITHOUT SCIATICA: ICD-10-CM

## 2021-12-06 DIAGNOSIS — Z12.11 SCREEN FOR COLON CANCER: ICD-10-CM

## 2021-12-06 DIAGNOSIS — G43.919 INTRACTABLE MIGRAINE WITHOUT STATUS MIGRAINOSUS, UNSPECIFIED MIGRAINE TYPE: ICD-10-CM

## 2021-12-06 DIAGNOSIS — F41.9 ANXIETY: ICD-10-CM

## 2021-12-06 LAB
ALBUMIN SERPL-MCNC: 4.3 G/DL (ref 3.5–5.2)
ALBUMIN/GLOB SERPL: 1.5 G/DL
ALP SERPL-CCNC: 72 U/L (ref 39–117)
ALT SERPL W P-5'-P-CCNC: 21 U/L (ref 1–33)
ANION GAP SERPL CALCULATED.3IONS-SCNC: 11.1 MMOL/L (ref 5–15)
AST SERPL-CCNC: 22 U/L (ref 1–32)
BASOPHILS # BLD AUTO: 0.04 10*3/MM3 (ref 0–0.2)
BASOPHILS NFR BLD AUTO: 0.5 % (ref 0–1.5)
BILIRUB SERPL-MCNC: 0.2 MG/DL (ref 0–1.2)
BUN SERPL-MCNC: 16 MG/DL (ref 6–20)
BUN/CREAT SERPL: 33.3 (ref 7–25)
CALCIUM SPEC-SCNC: 9 MG/DL (ref 8.6–10.5)
CHLORIDE SERPL-SCNC: 108 MMOL/L (ref 98–107)
CHOLEST SERPL-MCNC: 123 MG/DL (ref 0–200)
CO2 SERPL-SCNC: 24.9 MMOL/L (ref 22–29)
CREAT SERPL-MCNC: 0.48 MG/DL (ref 0.57–1)
DEPRECATED RDW RBC AUTO: 43.8 FL (ref 37–54)
EOSINOPHIL # BLD AUTO: 0.13 10*3/MM3 (ref 0–0.4)
EOSINOPHIL NFR BLD AUTO: 1.5 % (ref 0.3–6.2)
ERYTHROCYTE [DISTWIDTH] IN BLOOD BY AUTOMATED COUNT: 13.1 % (ref 12.3–15.4)
GFR SERPL CREATININE-BSD FRML MDRD: 137 ML/MIN/1.73
GLOBULIN UR ELPH-MCNC: 2.9 GM/DL
GLUCOSE SERPL-MCNC: 105 MG/DL (ref 65–99)
HBA1C MFR BLD: 5.49 % (ref 4.8–5.6)
HCT VFR BLD AUTO: 47.7 % (ref 34–46.6)
HDLC SERPL-MCNC: 46 MG/DL (ref 40–60)
HGB BLD-MCNC: 16.2 G/DL (ref 12–15.9)
LDLC SERPL CALC-MCNC: 63 MG/DL (ref 0–100)
LDLC/HDLC SERPL: 1.39 {RATIO}
LYMPHOCYTES # BLD AUTO: 3.1 10*3/MM3 (ref 0.7–3.1)
LYMPHOCYTES NFR BLD AUTO: 36.3 % (ref 19.6–45.3)
MCH RBC QN AUTO: 30.9 PG (ref 26.6–33)
MCHC RBC AUTO-ENTMCNC: 34 G/DL (ref 31.5–35.7)
MCV RBC AUTO: 91 FL (ref 79–97)
MONOCYTES # BLD AUTO: 0.62 10*3/MM3 (ref 0.1–0.9)
MONOCYTES NFR BLD AUTO: 7.3 % (ref 5–12)
NEUTROPHILS NFR BLD AUTO: 4.63 10*3/MM3 (ref 1.7–7)
NEUTROPHILS NFR BLD AUTO: 54 % (ref 42.7–76)
PLATELET # BLD AUTO: 127 10*3/MM3 (ref 140–450)
PMV BLD AUTO: 14.9 FL (ref 6–12)
POTASSIUM SERPL-SCNC: 4.2 MMOL/L (ref 3.5–5.2)
PROT SERPL-MCNC: 7.2 G/DL (ref 6–8.5)
RBC # BLD AUTO: 5.24 10*6/MM3 (ref 3.77–5.28)
SODIUM SERPL-SCNC: 144 MMOL/L (ref 136–145)
TRIGL SERPL-MCNC: 65 MG/DL (ref 0–150)
TSH SERPL DL<=0.05 MIU/L-ACNC: 1.99 UIU/ML (ref 0.27–4.2)
VLDLC SERPL-MCNC: 14 MG/DL (ref 5–40)
WBC NRBC COR # BLD: 8.55 10*3/MM3 (ref 3.4–10.8)

## 2021-12-06 PROCEDURE — 84443 ASSAY THYROID STIM HORMONE: CPT | Performed by: NURSE PRACTITIONER

## 2021-12-06 PROCEDURE — 99214 OFFICE O/P EST MOD 30 MIN: CPT | Performed by: NURSE PRACTITIONER

## 2021-12-06 PROCEDURE — 80061 LIPID PANEL: CPT | Performed by: NURSE PRACTITIONER

## 2021-12-06 PROCEDURE — 80053 COMPREHEN METABOLIC PANEL: CPT | Performed by: NURSE PRACTITIONER

## 2021-12-06 PROCEDURE — 83036 HEMOGLOBIN GLYCOSYLATED A1C: CPT | Performed by: NURSE PRACTITIONER

## 2021-12-06 PROCEDURE — 85025 COMPLETE CBC W/AUTO DIFF WBC: CPT | Performed by: NURSE PRACTITIONER

## 2021-12-06 RX ORDER — HYDROCHLOROTHIAZIDE 25 MG/1
25 TABLET ORAL DAILY
Qty: 90 TABLET | Refills: 1 | Status: SHIPPED | OUTPATIENT
Start: 2021-12-06 | End: 2022-02-28 | Stop reason: SDUPTHER

## 2021-12-06 RX ORDER — METOPROLOL SUCCINATE 100 MG/1
100 TABLET, EXTENDED RELEASE ORAL DAILY
Qty: 90 TABLET | Refills: 1 | Status: SHIPPED | OUTPATIENT
Start: 2021-12-06 | End: 2022-02-28 | Stop reason: SDUPTHER

## 2021-12-06 RX ORDER — IBUPROFEN 800 MG/1
800 TABLET ORAL EVERY 6 HOURS PRN
Qty: 90 TABLET | Refills: 3 | Status: SHIPPED | OUTPATIENT
Start: 2021-12-06 | End: 2022-02-28 | Stop reason: SDUPTHER

## 2021-12-06 RX ORDER — SUMATRIPTAN 50 MG/1
TABLET, FILM COATED ORAL
Qty: 12 TABLET | Refills: 1 | Status: SHIPPED | OUTPATIENT
Start: 2021-12-06 | End: 2022-02-28 | Stop reason: SDUPTHER

## 2021-12-06 RX ORDER — MONTELUKAST SODIUM 10 MG/1
10 TABLET ORAL NIGHTLY
Qty: 90 TABLET | Refills: 1 | Status: SHIPPED | OUTPATIENT
Start: 2021-12-06 | End: 2022-02-28 | Stop reason: SDUPTHER

## 2021-12-06 RX ORDER — CYCLOBENZAPRINE HCL 10 MG
10 TABLET ORAL 3 TIMES DAILY PRN
Qty: 90 TABLET | Refills: 1 | Status: SHIPPED | OUTPATIENT
Start: 2021-12-06 | End: 2022-02-28 | Stop reason: SDUPTHER

## 2021-12-06 RX ORDER — DULOXETIN HYDROCHLORIDE 20 MG/1
20 CAPSULE, DELAYED RELEASE ORAL DAILY
Qty: 30 CAPSULE | Refills: 2 | Status: SHIPPED | OUTPATIENT
Start: 2021-12-06 | End: 2021-12-23

## 2021-12-06 NOTE — PROGRESS NOTES
Chief Complaint  Follow-up and Med Refill    Subjective          Maureen Courtney presents to [facility]  History of Present Illness  She is stressed.  Her cousin killed his wife.  She is very stressed.  She has taken in the past xanax and that works better than the buspar.  She can't tolerate the elavil.  It will cause her to cry.  She is not SI/HI. She is working 7 days a week and she is doing 3 12's but is working 7 days a week.  She didn't do the lexapro.  She can't do that work.    She is doing ok with the imitrex.  She needs a refills  HTN:  She is taking toprol and hctz and is fasting for labs.  She is still smoking but is cutting back.    Asthma: She is taking singular.  She would like a few days off to rest due to the anxiety.  She will get nauseated at times.  No vomiting.    She is doing ok with the ibu and flexeril and takes it for the back.    She will have on and off diarrhea.  She is still very stressful.  She is having some low sugars--it has went to 61.  She is not eating a lot at times.  She will get dizziness/lightheadness.    Past Medical History:   • Anxiety   • Asthma   • Chest pain   • Chronic hepatitis (HCC)    Hepatitis A    • Diabetes mellitus (HCC)   • DM2 (diabetes mellitus, type 2) (HCC)   • Headache   • Hypertension   • Mitral valve disorder   • Polycythemia       Allergies  Beef allergy, Eggs or egg-derived products, Flu virus vaccine, Pork allergy, Tetanus toxoid, Cephalexin, Coffee, Dust mite extract, Egg phospholipids, Erythromycin, Mixed grasses, Molds & smuts, Msg [monosodium glutamate], Pineapple, and Prednisone    Past Surgical History:   • CARPAL TUNNEL RELEASE   • HYSTERECTOMY   • ROTATOR CUFF REPAIR   • TUBAL ABDOMINAL LIGATION       Social History     Tobacco Use   • Smoking status: Current Every Day Smoker     Packs/day: 0.50     Years: 40.00     Pack years: 20.00     Types: Cigarettes   • Smokeless tobacco: Never Used   Vaping Use   • Vaping  Use: Former   • Quit date: 6/19/2018   • Substances: Nicotine   • Devices: Pre-filled or refillable cartridge   Substance Use Topics   • Alcohol use: Not Currently     Comment: Occasionally   • Drug use: Never       Family History   Problem Relation Age of Onset   • Hypertension Mother    • Thyroid disease Mother    • Hyperlipidemia Mother    • Osteoarthritis Mother    • Heart disease Mother    • Diabetes Father    • Stroke Father    • Skin cancer Father    • Heart disease Maternal Grandfather    • Cancer Other    • Asthma Other    • Heart disease Other    • Hyperlipidemia Other    • Heart failure Other    • Hypertension Other    • Diabetes Other         Unspecified   • Heart failure Sister 38        Downs Syndrome        Health Maintenance Due   Topic Date Due   • COLORECTAL CANCER SCREENING  Never done   • ANNUAL PHYSICAL  Never done   • COVID-19 Vaccine (1) Never done   • Pneumococcal Vaccine 0-64 (1 of 2 - PPSV23) Never done   • Hepatitis B (1 of 3 - Risk 3-dose series) Never done   • TDAP/TD VACCINES (1 - Tdap) Never done   • HEPATITIS C SCREENING  Never done   • DIABETIC FOOT EXAM  Never done   • DIABETIC EYE EXAM  Never done   • HEMOGLOBIN A1C  08/02/2021          Current Outpatient Medications:   •  albuterol (PROVENTIL) (2.5 MG/3ML) 0.083% nebulizer solution, Take 2.5 mg by nebulization Every 4 (Four) Hours As Needed for Wheezing., Disp: , Rfl:   •  albuterol sulfate  (90 Base) MCG/ACT inhaler, Inhale 2 puffs Every 4 (Four) Hours As Needed for Wheezing., Disp: , Rfl:   •  cetirizine (ZyrTEC Allergy) 10 MG tablet, Zyrtec 10 mg oral tablet take 1 tablet (10 mg) by oral route once daily   Active, Disp: , Rfl:   •  cyclobenzaprine (FLEXERIL) 10 MG tablet, Take 1 tablet by mouth 3 (Three) Times a Day As Needed for Muscle Spasms., Disp: 90 tablet, Rfl: 1  •  dicyclomine (Bentyl) 10 MG capsule, Take 1 capsule by mouth 4 (Four) Times a Day Before Meals & at Bedtime., Disp: 90 capsule, Rfl: 0  •   diphenhydrAMINE (Benadryl Allergy) 25 mg capsule, Benadryl 25 mg oral capsule take 1 capsule (25 mg) by oral route every 6 hours as needed   Active, Disp: , Rfl:   •  EPINEPHrine (EpiPen 2-Alessandro) 0.3 MG/0.3ML solution auto-injector injection, EpiPen 0.3 mg/0.3 mL injection auto-injector inject 0.3 mg by intramuscular route once as needed for anaphylaxis   Suspended, Disp: , Rfl:   •  hydroCHLOROthiazide (HYDRODIURIL) 25 MG tablet, Take 1 tablet by mouth Daily., Disp: 90 tablet, Rfl: 1  •  ibuprofen (ADVIL,MOTRIN) 800 MG tablet, Take 1 tablet by mouth Every 6 (Six) Hours As Needed for Mild Pain ., Disp: 90 tablet, Rfl: 3  •  ipratropium (ATROVENT) 0.02 % nebulizer solution, Take 500 mcg by nebulization 4 (Four) Times a Day., Disp: , Rfl:   •  metoprolol succinate XL (TOPROL-XL) 100 MG 24 hr tablet, Take 1 tablet by mouth Daily., Disp: 90 tablet, Rfl: 1  •  montelukast (SINGULAIR) 10 MG tablet, Take 1 tablet by mouth Every Night., Disp: 90 tablet, Rfl: 1  •  Rimegepant Sulfate (Nurtec) 75 MG tablet dispersible tablet, Take 1 tablet by mouth Daily As Needed (headache)., Disp: 30 tablet, Rfl: 1  •  SUMAtriptan (Imitrex) 50 MG tablet, Take one tablet at onset of headache. May repeat dose one time in 2 hours if headache not relieved., Disp: 12 tablet, Rfl: 1  •  DULoxetine (Cymbalta) 20 MG capsule, Take 1 capsule by mouth Daily., Disp: 30 capsule, Rfl: 2    Medications Discontinued During This Encounter   Medication Reason   • azithromycin (Zithromax Z-Alessandro) 250 MG tablet *Therapy completed   • ofloxacin (FLOXIN) 0.3 % otic solution *Therapy completed   • predniSONE (DELTASONE) 10 MG tablet *Therapy completed   • promethazine (PHENERGAN) 12.5 MG tablet *Therapy completed   • promethazine-dextromethorphan (PROMETHAZINE-DM) 6.25-15 MG/5ML syrup *Therapy completed   • sulfamethoxazole-trimethoprim (Bactrim DS) 800-160 MG per tablet *Therapy completed   • cyclobenzaprine (FLEXERIL) 10 MG tablet Reorder   • hydroCHLOROthiazide  "(HYDRODIURIL) 25 MG tablet Reorder   • ibuprofen (ADVIL,MOTRIN) 800 MG tablet Reorder   • metoprolol succinate XL (TOPROL-XL) 100 MG 24 hr tablet Reorder   • montelukast (SINGULAIR) 10 MG tablet Reorder   • SUMAtriptan (Imitrex) 50 MG tablet Reorder       Immunization History   Administered Date(s) Administered   • Influenza TIV (IM) 10/21/2014   • Influenza, Unspecified 10/21/2014       Review of Systems   Constitutional: Negative for fatigue.   Respiratory: Positive for cough. Negative for shortness of breath.    Cardiovascular: Negative for chest pain.   Gastrointestinal: Negative for diarrhea, nausea and vomiting.        Objective       Vitals:    12/06/21 0719   BP: 140/78   Pulse:    Resp:    Temp:    SpO2:      Body mass index is 34.42 kg/m².   Vitals:    12/06/21 0713 12/06/21 0719   BP: 147/81 140/78   Pulse: 72    Resp: 16    Temp: 97.9 °F (36.6 °C)    SpO2: 96%    Weight: 88.1 kg (194 lb 4.8 oz)    Height: 160 cm (63\")            Physical Exam  Vitals reviewed.   Constitutional:       Appearance: Normal appearance. She is well-developed.   Cardiovascular:      Rate and Rhythm: Normal rate and regular rhythm.      Heart sounds: Normal heart sounds. No murmur heard.      Pulmonary:      Effort: Pulmonary effort is normal.      Breath sounds: Wheezing present. No rhonchi.   Neurological:      Mental Status: She is alert and oriented to person, place, and time.      Cranial Nerves: No cranial nerve deficit.      Motor: No weakness.   Psychiatric:         Mood and Affect: Mood and affect normal.             Result Review :     The following data was reviewed by: LEONEL River on 12/06/2021:    Common labs    Common Labsle 2/2/21 2/2/21    1928 1928   Glucose 98    BUN 14    Creatinine 0.64    Sodium 142    Potassium 4.6    Chloride 102    Calcium 9.6    Albumin 4.1    Total Bilirubin 0.31    Alkaline Phosphatase 79    AST (SGOT) 17    ALT (SGPT) 16    WBC 9.78    Hemoglobin 17.5 (A)    Hematocrit " 52.1 (A)    Platelets 150    Total Cholesterol 111    Triglycerides 84    HDL Cholesterol 40    LDL Cholesterol  54 (A)    Hemoglobin A1C  5.4   (A) Abnormal value       Comments are available for some flowsheets but are not being displayed.           Most Recent A1C    HGBA1C Most Recent 2/2/21   Hemoglobin A1C 5.4      Comments are available for some flowsheets but are not being displayed.                          Assessment and Plan      Diagnoses and all orders for this visit:    1. Type 2 diabetes mellitus without complication, without long-term current use of insulin (HCC) (Primary)  -     Comprehensive Metabolic Panel  -     CBC & Differential  -     TSH  -     Lipid Panel  -     Hemoglobin A1c    2. Primary hypertension  -     Comprehensive Metabolic Panel  -     CBC & Differential  -     TSH  -     Lipid Panel  -     metoprolol succinate XL (TOPROL-XL) 100 MG 24 hr tablet; Take 1 tablet by mouth Daily.  Dispense: 90 tablet; Refill: 1  -     hydroCHLOROthiazide (HYDRODIURIL) 25 MG tablet; Take 1 tablet by mouth Daily.  Dispense: 90 tablet; Refill: 1    3. Intractable migraine without status migrainosus, unspecified migraine type  -     SUMAtriptan (Imitrex) 50 MG tablet; Take one tablet at onset of headache. May repeat dose one time in 2 hours if headache not relieved.  Dispense: 12 tablet; Refill: 1    4. Screen for colon cancer  -     Cologuard - Stool, Per Rectum; Future    5. Anxiety  -     DULoxetine (Cymbalta) 20 MG capsule; Take 1 capsule by mouth Daily.  Dispense: 30 capsule; Refill: 2    6. Mild intermittent asthma without complication  -     montelukast (SINGULAIR) 10 MG tablet; Take 1 tablet by mouth Every Night.  Dispense: 90 tablet; Refill: 1    7. Chronic bilateral low back pain without sciatica  -     cyclobenzaprine (FLEXERIL) 10 MG tablet; Take 1 tablet by mouth 3 (Three) Times a Day As Needed for Muscle Spasms.  Dispense: 90 tablet; Refill: 1  -     ibuprofen (ADVIL,MOTRIN) 800 MG tablet;  Take 1 tablet by mouth Every 6 (Six) Hours As Needed for Mild Pain .  Dispense: 90 tablet; Refill: 3            Follow Up     Return in about 3 months (around 3/6/2022).  Follow-up in 3 months for labs and appt. Call with any concerns or questions that you may have regarding your medications or history.    We will start cymbalta for the anxiety.  She is aware of the side effects.  We will place off work for   Patient was given instructions and counseling regarding her condition or for health maintenance advice. Please see specific information pulled into the AVS if appropriate.     Maureen Courtney  reports that she has been smoking cigarettes. She has a 20.00 pack-year smoking history. She has never used smokeless tobacco.. I have educated her on the risk of diseases from using tobacco products such as cancer, COPD and heart disease.     I advised her to quit and she is not willing to quit.  She is trying to slow down and using the vape.    LEONEL River

## 2021-12-23 ENCOUNTER — OFFICE VISIT (OUTPATIENT)
Dept: FAMILY MEDICINE CLINIC | Facility: CLINIC | Age: 51
End: 2021-12-23

## 2021-12-23 VITALS
HEIGHT: 63 IN | WEIGHT: 195.2 LBS | SYSTOLIC BLOOD PRESSURE: 120 MMHG | RESPIRATION RATE: 12 BRPM | TEMPERATURE: 97.3 F | HEART RATE: 71 BPM | DIASTOLIC BLOOD PRESSURE: 80 MMHG | OXYGEN SATURATION: 96 % | BODY MASS INDEX: 34.59 KG/M2

## 2021-12-23 DIAGNOSIS — G43.919 INTRACTABLE MIGRAINE WITHOUT STATUS MIGRAINOSUS, UNSPECIFIED MIGRAINE TYPE: Primary | ICD-10-CM

## 2021-12-23 PROCEDURE — 99213 OFFICE O/P EST LOW 20 MIN: CPT | Performed by: NURSE PRACTITIONER

## 2021-12-23 NOTE — PROGRESS NOTES
Chief Complaint  Headache (FMLA paperwork)    Subjective          Maureen Courtney presents to [facility]  History of Present Illness  Migraines as a kid then hyst then got better but since with covid but she didn't have tested due to it was 2019. they are constant.  She is taking the imitrex when she needs it.  She was on the topomax but she had to stop the med due to the s/e.  She is needing FMLA paperwork filled out for her migraines.  She has been having 1-2 episodes more frequent though she does not want to take routine medicines right now.        Past Medical History:   • Anxiety   • Asthma   • Chest pain   • Chronic hepatitis (HCC)    Hepatitis A    • Diabetes mellitus (HCC)   • DM2 (diabetes mellitus, type 2) (HCC)   • Headache   • Hypertension   • Mitral valve disorder   • Polycythemia       Allergies  Beef allergy, Eggs or egg-derived products, Flu virus vaccine, Pork allergy, Tetanus toxoid, Cephalexin, Coffee, Dust mite extract, Egg phospholipids, Erythromycin, Mixed grasses, Molds & smuts, Msg [monosodium glutamate], Pineapple, and Prednisone    Past Surgical History:   • CARPAL TUNNEL RELEASE   • HYSTERECTOMY   • ROTATOR CUFF REPAIR   • TUBAL ABDOMINAL LIGATION       Social History     Tobacco Use   • Smoking status: Current Every Day Smoker     Packs/day: 1.00     Years: 40.00     Pack years: 40.00     Types: Cigarettes   • Smokeless tobacco: Never Used   Vaping Use   • Vaping Use: Former   • Quit date: 6/19/2018   • Substances: Nicotine   • Devices: Pre-filled or refillable cartridge   Substance Use Topics   • Alcohol use: Not Currently     Comment: Occasionally   • Drug use: Never       Family History   Problem Relation Age of Onset   • Hypertension Mother    • Thyroid disease Mother    • Hyperlipidemia Mother    • Osteoarthritis Mother    • Heart disease Mother    • Diabetes Father    • Stroke Father    • Skin cancer Father    • Heart disease Maternal Grandfather     • Cancer Other    • Asthma Other    • Heart disease Other    • Hyperlipidemia Other    • Heart failure Other    • Hypertension Other    • Diabetes Other         Unspecified   • Heart failure Sister 38        Downs Syndrome        Health Maintenance Due   Topic Date Due   • COLORECTAL CANCER SCREENING  Never done   • ANNUAL PHYSICAL  Never done   • COVID-19 Vaccine (1) Never done   • Pneumococcal Vaccine 0-64 (1 of 2 - PPSV23) Never done   • Hepatitis B (1 of 3 - Risk 3-dose series) Never done   • TDAP/TD VACCINES (1 - Tdap) Never done   • HEPATITIS C SCREENING  Never done   • DIABETIC FOOT EXAM  Never done   • DIABETIC EYE EXAM  Never done   • ZOSTER VACCINE (1 of 2) Never done   • LUNG CANCER SCREENING  Never done          Current Outpatient Medications:   •  albuterol (PROVENTIL) (2.5 MG/3ML) 0.083% nebulizer solution, Take 2.5 mg by nebulization Every 4 (Four) Hours As Needed for Wheezing., Disp: , Rfl:   •  albuterol sulfate  (90 Base) MCG/ACT inhaler, Inhale 2 puffs Every 4 (Four) Hours As Needed for Wheezing., Disp: , Rfl:   •  cetirizine (ZyrTEC Allergy) 10 MG tablet, Zyrtec 10 mg oral tablet take 1 tablet (10 mg) by oral route once daily   Active, Disp: , Rfl:   •  cyclobenzaprine (FLEXERIL) 10 MG tablet, Take 1 tablet by mouth 3 (Three) Times a Day As Needed for Muscle Spasms., Disp: 90 tablet, Rfl: 1  •  dicyclomine (Bentyl) 10 MG capsule, Take 1 capsule by mouth 4 (Four) Times a Day Before Meals & at Bedtime., Disp: 90 capsule, Rfl: 0  •  diphenhydrAMINE (Benadryl Allergy) 25 mg capsule, Benadryl 25 mg oral capsule take 1 capsule (25 mg) by oral route every 6 hours as needed   Active, Disp: , Rfl:   •  EPINEPHrine (EpiPen 2-Alessandro) 0.3 MG/0.3ML solution auto-injector injection, EpiPen 0.3 mg/0.3 mL injection auto-injector inject 0.3 mg by intramuscular route once as needed for anaphylaxis   Suspended, Disp: , Rfl:   •  hydroCHLOROthiazide (HYDRODIURIL) 25 MG tablet, Take 1 tablet by mouth Daily.,  "Disp: 90 tablet, Rfl: 1  •  ibuprofen (ADVIL,MOTRIN) 800 MG tablet, Take 1 tablet by mouth Every 6 (Six) Hours As Needed for Mild Pain ., Disp: 90 tablet, Rfl: 3  •  ipratropium (ATROVENT) 0.02 % nebulizer solution, Take 500 mcg by nebulization 4 (Four) Times a Day., Disp: , Rfl:   •  metoprolol succinate XL (TOPROL-XL) 100 MG 24 hr tablet, Take 1 tablet by mouth Daily., Disp: 90 tablet, Rfl: 1  •  montelukast (SINGULAIR) 10 MG tablet, Take 1 tablet by mouth Every Night., Disp: 90 tablet, Rfl: 1  •  Rimegepant Sulfate (Nurtec) 75 MG tablet dispersible tablet, Take 1 tablet by mouth Daily As Needed (headache)., Disp: 30 tablet, Rfl: 1  •  SUMAtriptan (Imitrex) 50 MG tablet, Take one tablet at onset of headache. May repeat dose one time in 2 hours if headache not relieved., Disp: 12 tablet, Rfl: 1    Medications Discontinued During This Encounter   Medication Reason   • DULoxetine (Cymbalta) 20 MG capsule        Immunization History   Administered Date(s) Administered   • Influenza TIV (IM) 10/21/2014   • Influenza, Unspecified 10/21/2014       Review of Systems     Objective       Vitals:    12/23/21 1131   BP: 120/80   Pulse:    Resp:    Temp:    SpO2:      Body mass index is 34.58 kg/m².     Vitals:    12/23/21 1129 12/23/21 1131   BP: 146/79 120/80   Pulse: 71    Resp: 12    Temp: 97.3 °F (36.3 °C)    SpO2: 96%    Weight: 88.5 kg (195 lb 3.2 oz)    Height: 160 cm (63\")          Physical Exam  Constitutional:       Appearance: Normal appearance.   HENT:      Head: Normocephalic.   Pulmonary:      Effort: Pulmonary effort is normal.   Skin:     Findings: No bruising.   Neurological:      General: No focal deficit present.      Mental Status: She is alert and oriented to person, place, and time.   Psychiatric:         Mood and Affect: Mood normal.         Behavior: Behavior normal.         Thought Content: Thought content normal.         Judgment: Judgment normal.             Result Review :     The following data " was reviewed by: LEONEL River on 12/23/2021:                     Assessment and Plan      Diagnoses and all orders for this visit:    1. Intractable migraine without status migrainosus, unspecified migraine type (Primary)            Follow Up     Return for 3-6 months if the headaches worsen.  I filled out FMLA for her migraines.  She is aware if the migraines continue we will send to neurology for possible injections by Botox if needed or trial other medications other than Topamax.  Patient was given instructions and counseling regarding her condition or for health maintenance advice. Please see specific information pulled into the AVS if appropriate.   LEONEL River

## 2022-01-11 ENCOUNTER — OFFICE VISIT (OUTPATIENT)
Dept: FAMILY MEDICINE CLINIC | Facility: CLINIC | Age: 52
End: 2022-01-11

## 2022-01-11 DIAGNOSIS — R09.81 NASAL CONGESTION: Primary | ICD-10-CM

## 2022-01-11 DIAGNOSIS — J06.9 UPPER RESPIRATORY TRACT INFECTION, UNSPECIFIED TYPE: ICD-10-CM

## 2022-01-11 DIAGNOSIS — R50.9 FEVER, UNSPECIFIED FEVER CAUSE: ICD-10-CM

## 2022-01-11 PROCEDURE — U0004 COV-19 TEST NON-CDC HGH THRU: HCPCS | Performed by: NURSE PRACTITIONER

## 2022-01-11 PROCEDURE — 99213 OFFICE O/P EST LOW 20 MIN: CPT | Performed by: NURSE PRACTITIONER

## 2022-01-11 RX ORDER — PREDNISONE 20 MG/1
TABLET ORAL
Qty: 18 TABLET | Refills: 0 | Status: SHIPPED | OUTPATIENT
Start: 2022-01-11 | End: 2022-01-24

## 2022-01-11 RX ORDER — AZITHROMYCIN 250 MG/1
TABLET, FILM COATED ORAL
Qty: 6 TABLET | Refills: 0 | Status: SHIPPED | OUTPATIENT
Start: 2022-01-11 | End: 2022-01-24

## 2022-01-11 NOTE — PROGRESS NOTES
Chief Complaint  Nasal Congestion, Cough, Generalized Body Aches, and Fever (low grade 99)    Subjective         Maureen Courtney presents to [facility]  History of Present Illness    Mode of Visit: Video  Location of patient: home  You have chosen to receive care through a telehealth visit.  Does the patient consent to use a video/audio connection for your medical care today? Yes  The visit included audio and video interaction. No technical issues occurred during this visit.   Saturday she ran a fever on Sat and then on Sunday no fever. She has been having body aches, low grade fever 99.  Her BP was 147/90's in the wrist.  She is coughing.  She is having nasal congestion.  No cold med.    Objective   Vital Signs:   There were no vitals taken for this visit.    Physical Exam   Constitutional: She appears well-developed and well-nourished.   HENT:   Head: Normocephalic.   Pulmonary/Chest: Effort normal.  No respiratory distress.  Neurological: She is alert. No cranial nerve deficit.   Skin: No bruising and no rash noted.   Psychiatric: She has a normal mood and affect. Her speech is normal and behavior is normal. Thought content normal.     Result Review :                 Assessment and Plan   Diagnoses and all orders for this visit:    1. Nasal congestion (Primary)  -     azithromycin (Zithromax Z-Alessandro) 250 MG tablet; Take 2 tablets by mouth on day 1, then 1 tablet daily on days 2-5  Dispense: 6 tablet; Refill: 0  -     predniSONE (DELTASONE) 20 MG tablet; 1 tab tid for 3 days then 1 tab po bid for 3 days then daily for 3 days  Dispense: 18 tablet; Refill: 0  -     COVID-19,APTIMA PANTHER(DONY),[facility], NP/OP SWAB IN UTM/VTM/SALINE TRANSPORT MEDIA,24 HR TAT - Swab, Nasopharynx    2. Fever, unspecified fever cause  -     azithromycin (Zithromax Z-Alessandro) 250 MG tablet; Take 2 tablets by mouth on day 1, then 1 tablet daily on days 2-5  Dispense: 6 tablet; Refill: 0    3. Upper  respiratory tract infection, unspecified type  -     azithromycin (Zithromax Z-Alessandro) 250 MG tablet; Take 2 tablets by mouth on day 1, then 1 tablet daily on days 2-5  Dispense: 6 tablet; Refill: 0        Follow Up   Return if symptoms worsen or fail to improve.  May return to work on Thursday if negative COVID since she has not been around any exposure to COVID recently.  Plenty of fluids.  Patient is able to tolerate Zithromax and prednisone even though she does have allergies.  Call or return to clinic prn if these symptoms worsen or fail to improve as anticipated.  Patient was given instructions and counseling regarding her condition or for health maintenance advice. Please see specific information pulled into the AVS if appropriate.     Lisseth Viveros, APRN  01/11/2022

## 2022-01-12 ENCOUNTER — TELEPHONE (OUTPATIENT)
Dept: FAMILY MEDICINE CLINIC | Facility: CLINIC | Age: 52
End: 2022-01-12

## 2022-01-12 LAB — SARS-COV-2 RNA PNL SPEC NAA+PROBE: NOT DETECTED

## 2022-01-12 NOTE — TELEPHONE ENCOUNTER
Caller: Maureen Courtney    Relationship: Self    Best call back number: [phone]    Caller requesting test results: PATIENT    What test was performed: COVID    When was the test performed: 01/11/22    Where was the test performed: THERE AT OFFICE    Additional notes: WANTING TO SEE IF RESULTS WERE BACK YET

## 2022-01-24 ENCOUNTER — OFFICE VISIT (OUTPATIENT)
Dept: FAMILY MEDICINE CLINIC | Facility: CLINIC | Age: 52
End: 2022-01-24

## 2022-01-24 VITALS — BODY MASS INDEX: 34.54 KG/M2 | WEIGHT: 195 LBS

## 2022-01-24 DIAGNOSIS — J06.9 VIRAL UPPER RESPIRATORY TRACT INFECTION: ICD-10-CM

## 2022-01-24 DIAGNOSIS — Z20.822 FEVER WITH EXPOSURE TO COVID-19 VIRUS: Primary | ICD-10-CM

## 2022-01-24 DIAGNOSIS — R50.9 FEVER WITH EXPOSURE TO COVID-19 VIRUS: Primary | ICD-10-CM

## 2022-01-24 PROCEDURE — U0004 COV-19 TEST NON-CDC HGH THRU: HCPCS | Performed by: NURSE PRACTITIONER

## 2022-01-24 PROCEDURE — 99213 OFFICE O/P EST LOW 20 MIN: CPT | Performed by: NURSE PRACTITIONER

## 2022-01-24 RX ORDER — BROMPHENIRAMINE MALEATE, PSEUDOEPHEDRINE HYDROCHLORIDE, AND DEXTROMETHORPHAN HYDROBROMIDE 2; 30; 10 MG/5ML; MG/5ML; MG/5ML
5 SYRUP ORAL 4 TIMES DAILY PRN
Qty: 120 ML | Refills: 0 | Status: SHIPPED | OUTPATIENT
Start: 2022-01-24 | End: 2022-02-28

## 2022-01-24 NOTE — PROGRESS NOTES
Chief Complaint  Illness (fever, headache, body aches, began 1/21/2022)    Subjective      History of Present Illness  Maureen Courtney presents to [facility]     She was exposed to Covid at work last week.  Friday developed a temp of 102 has had cough and sinus congestion with headache since then.  She needs a work excuse.  She is taking DayQuil and Motrin OTC.  Denies shortness of breath.    Patient understanding that this is a telehealth visit.  I cannot perform a full physical exam, therefore something might be missed, or patient may need to come into the office for further evaluation.  Patient is understanding and consents to this type of visit.  This visit was done through [employer] and patient was home alone for the call.       Maureen Courtney  reports that she has been smoking cigarettes. She started smoking about 40 years ago. She has a 20.00 pack-year smoking history. She has never used smokeless tobacco.         Allergies  Beef allergy, Eggs or egg-derived products, Influenza virus vaccine, Pork allergy, Tetanus toxoid, Cephalexin, Coffee, Dust mite extract, Egg phospholipids, Erythromycin, Mixed grasses, Molds & smuts, Msg [monosodium glutamate], Pineapple, and Prednisone    Objective     There were no vitals filed for this visit.  Body mass index is 34.54 kg/m².     Review of Systems    Physical Exam     Gen: well-nourished, no acute distress  HENT: atraumatic, normocephalic  Eyes: extraocular movements intact, no scleral icterus  Lung: breathing comfortably, no cough  Skin: no visible rash, no lesions  Neuro: grossly oriented to person, place, and time. no facial droop   Psych: normal mood and affect      Result Review :    The following data was reviewed by: LEONEL Villegas on 01/24/2022:       Depression: Not at risk   • PHQ-2 Score: 0       Common labs    Common Labsle 2/2/21 2/2/21 12/6/21 12/6/21 12/6/21 12/6/21    1928 1928 0736 0736 0736 0736   Glucose 98     105 (A)    BUN 14    16    Creatinine 0.64    0.48 (A)    eGFR Non African Am     137    Sodium 142    144    Potassium 4.6    4.2    Chloride 102    108 (A)    Calcium 9.6    9.0    Albumin 4.1    4.30    Total Bilirubin 0.31    0.2    Alkaline Phosphatase 79    72    AST (SGOT) 17    22    ALT (SGPT) 16    21    WBC 9.78  8.55      Hemoglobin 17.5 (A)  16.2 (A)      Hematocrit 52.1 (A)  47.7 (A)      Platelets 150  127 (A)      Total Cholesterol      123   Total Cholesterol 111        Triglycerides 84     65   HDL Cholesterol 40     46   LDL Cholesterol  54 (A)     63   Hemoglobin A1C  5.4  5.49     (A) Abnormal value       Comments are available for some flowsheets but are not being displayed.                           Assessment and Plan     Diagnoses and all orders for this visit:    1. Fever with exposure to COVID-19 virus (Primary)  -     COVID-19,[facility]TIM[facility] PANTHER(DONY),[facility], NP/OP SWAB IN UTM/VTM/SALINE TRANSPORT MEDIA,24 HR TAT - Swab, Nasopharynx    2. Viral upper respiratory tract infection  -     COVID-19,[facility]TIM[facility] PANTHER(DONY),[facility], NP/OP SWAB IN UTM/VTM/SALINE TRANSPORT MEDIA,24 HR TAT - Swab, Nasopharynx    Other orders  -     brompheniramine-pseudoephedrine-DM 30-2-10 MG/5ML syrup; Take 5 mL by mouth 4 (Four) Times a Day As Needed for Congestion or Cough.  Dispense: 120 mL; Refill: 0      Transmission precautions discussed, OTC medications discussed, discussed when to seek emergency treatment.  Call with worsening of symptoms  Quarantine. Push fluids, zinc, vitamin c and vitamin d.         Follow Up     Return if symptoms worsen or fail to improve.    Patient was given instructions and counseling regarding her condition or for health maintenance advice. Please see specific information pulled into the AVS if appropriate.     Julia Bronson, APRN

## 2022-01-25 ENCOUNTER — TELEPHONE (OUTPATIENT)
Dept: FAMILY MEDICINE CLINIC | Facility: CLINIC | Age: 52
End: 2022-01-25

## 2022-01-25 LAB — SARS-COV-2 RNA PNL SPEC NAA+PROBE: DETECTED

## 2022-01-25 NOTE — TELEPHONE ENCOUNTER
Caller: Maureen Courtney    Relationship: Self    Best call back number: [phone]    Caller requesting test results: YES    What test was performed: COVID    When was the test performed: YESTERDAY    Where was the test performed: OFFICE    Additional notes:

## 2022-01-25 NOTE — TELEPHONE ENCOUNTER
Advised patient her results were not in yet but as soon as we had them we would give her a call. Patient voiced understanding.

## 2022-02-28 ENCOUNTER — OFFICE VISIT (OUTPATIENT)
Dept: FAMILY MEDICINE CLINIC | Facility: CLINIC | Age: 52
End: 2022-02-28

## 2022-02-28 VITALS
HEART RATE: 74 BPM | DIASTOLIC BLOOD PRESSURE: 88 MMHG | RESPIRATION RATE: 18 BRPM | HEIGHT: 63 IN | OXYGEN SATURATION: 98 % | WEIGHT: 201.4 LBS | BODY MASS INDEX: 35.68 KG/M2 | SYSTOLIC BLOOD PRESSURE: 140 MMHG | TEMPERATURE: 97.9 F

## 2022-02-28 DIAGNOSIS — R13.10 DYSPHAGIA, UNSPECIFIED TYPE: ICD-10-CM

## 2022-02-28 DIAGNOSIS — Z53.20 SCREENING MAMMOGRAPHY DECLINED: ICD-10-CM

## 2022-02-28 DIAGNOSIS — J45.20 MILD INTERMITTENT ASTHMA WITHOUT COMPLICATION: ICD-10-CM

## 2022-02-28 DIAGNOSIS — Z91.013 SHELLFISH ALLERGY: ICD-10-CM

## 2022-02-28 DIAGNOSIS — F17.210 CIGARETTE NICOTINE DEPENDENCE WITHOUT COMPLICATION: ICD-10-CM

## 2022-02-28 DIAGNOSIS — M54.50 CHRONIC BILATERAL LOW BACK PAIN WITHOUT SCIATICA: ICD-10-CM

## 2022-02-28 DIAGNOSIS — I10 PRIMARY HYPERTENSION: Primary | ICD-10-CM

## 2022-02-28 DIAGNOSIS — G43.919 INTRACTABLE MIGRAINE WITHOUT STATUS MIGRAINOSUS, UNSPECIFIED MIGRAINE TYPE: ICD-10-CM

## 2022-02-28 DIAGNOSIS — Z53.20 COLON CANCER SCREENING DECLINED: ICD-10-CM

## 2022-02-28 DIAGNOSIS — T78.1XXA DELAYED ALLERGIC REACTION TO RED MEAT: ICD-10-CM

## 2022-02-28 DIAGNOSIS — Z11.59 ENCOUNTER FOR HEPATITIS C SCREENING TEST FOR LOW RISK PATIENT: ICD-10-CM

## 2022-02-28 DIAGNOSIS — H92.02 LEFT EAR PAIN: ICD-10-CM

## 2022-02-28 DIAGNOSIS — G89.29 CHRONIC BILATERAL LOW BACK PAIN WITHOUT SCIATICA: ICD-10-CM

## 2022-02-28 LAB
ALBUMIN SERPL-MCNC: 4.6 G/DL (ref 3.5–5.2)
ALBUMIN/GLOB SERPL: 1.9 G/DL
ALP SERPL-CCNC: 69 U/L (ref 39–117)
ALT SERPL W P-5'-P-CCNC: 18 U/L (ref 1–33)
ANION GAP SERPL CALCULATED.3IONS-SCNC: 15 MMOL/L (ref 5–15)
AST SERPL-CCNC: 13 U/L (ref 1–32)
BASOPHILS # BLD AUTO: 0.05 10*3/MM3 (ref 0–0.2)
BASOPHILS NFR BLD AUTO: 0.7 % (ref 0–1.5)
BILIRUB SERPL-MCNC: 0.3 MG/DL (ref 0–1.2)
BUN SERPL-MCNC: 16 MG/DL (ref 6–20)
BUN/CREAT SERPL: 30.8 (ref 7–25)
CALCIUM SPEC-SCNC: 9 MG/DL (ref 8.6–10.5)
CHLORIDE SERPL-SCNC: 103 MMOL/L (ref 98–107)
CHOLEST SERPL-MCNC: 122 MG/DL (ref 0–200)
CO2 SERPL-SCNC: 24 MMOL/L (ref 22–29)
CREAT SERPL-MCNC: 0.52 MG/DL (ref 0.57–1)
DEPRECATED RDW RBC AUTO: 44.7 FL (ref 37–54)
EGFRCR SERPLBLD CKD-EPI 2021: 113.4 ML/MIN/1.73
EOSINOPHIL # BLD AUTO: 0.12 10*3/MM3 (ref 0–0.4)
EOSINOPHIL NFR BLD AUTO: 1.6 % (ref 0.3–6.2)
ERYTHROCYTE [DISTWIDTH] IN BLOOD BY AUTOMATED COUNT: 13.4 % (ref 12.3–15.4)
GLOBULIN UR ELPH-MCNC: 2.4 GM/DL
GLUCOSE SERPL-MCNC: 99 MG/DL (ref 65–99)
HCT VFR BLD AUTO: 49.7 % (ref 34–46.6)
HCV AB SER DONR QL: NORMAL
HDLC SERPL-MCNC: 50 MG/DL (ref 40–60)
HGB BLD-MCNC: 17.1 G/DL (ref 12–15.9)
LDLC SERPL CALC-MCNC: 54 MG/DL (ref 0–100)
LDLC/HDLC SERPL: 1.07 {RATIO}
LYMPHOCYTES # BLD AUTO: 2.6 10*3/MM3 (ref 0.7–3.1)
LYMPHOCYTES NFR BLD AUTO: 34.1 % (ref 19.6–45.3)
MCH RBC QN AUTO: 31.5 PG (ref 26.6–33)
MCHC RBC AUTO-ENTMCNC: 34.4 G/DL (ref 31.5–35.7)
MCV RBC AUTO: 91.7 FL (ref 79–97)
MONOCYTES # BLD AUTO: 0.53 10*3/MM3 (ref 0.1–0.9)
MONOCYTES NFR BLD AUTO: 6.9 % (ref 5–12)
NEUTROPHILS NFR BLD AUTO: 4.29 10*3/MM3 (ref 1.7–7)
NEUTROPHILS NFR BLD AUTO: 56.2 % (ref 42.7–76)
PLATELET # BLD AUTO: 129 10*3/MM3 (ref 140–450)
PMV BLD AUTO: 13.2 FL (ref 6–12)
POTASSIUM SERPL-SCNC: 4.1 MMOL/L (ref 3.5–5.2)
PROT SERPL-MCNC: 7 G/DL (ref 6–8.5)
RBC # BLD AUTO: 5.42 10*6/MM3 (ref 3.77–5.28)
SODIUM SERPL-SCNC: 142 MMOL/L (ref 136–145)
TRIGL SERPL-MCNC: 93 MG/DL (ref 0–150)
TSH SERPL DL<=0.05 MIU/L-ACNC: 2.07 UIU/ML (ref 0.27–4.2)
VLDLC SERPL-MCNC: 18 MG/DL (ref 5–40)
WBC NRBC COR # BLD: 7.63 10*3/MM3 (ref 3.4–10.8)

## 2022-02-28 PROCEDURE — 86803 HEPATITIS C AB TEST: CPT | Performed by: NURSE PRACTITIONER

## 2022-02-28 PROCEDURE — 80061 LIPID PANEL: CPT | Performed by: NURSE PRACTITIONER

## 2022-02-28 PROCEDURE — 86003 ALLG SPEC IGE CRUDE XTRC EA: CPT | Performed by: NURSE PRACTITIONER

## 2022-02-28 PROCEDURE — 80050 GENERAL HEALTH PANEL: CPT | Performed by: NURSE PRACTITIONER

## 2022-02-28 PROCEDURE — 99214 OFFICE O/P EST MOD 30 MIN: CPT | Performed by: NURSE PRACTITIONER

## 2022-02-28 RX ORDER — RIMEGEPANT SULFATE 75 MG/75MG
75 TABLET, ORALLY DISINTEGRATING ORAL DAILY PRN
Qty: 30 TABLET | Refills: 1 | Status: SHIPPED | OUTPATIENT
Start: 2022-02-28 | End: 2022-08-11 | Stop reason: SDUPTHER

## 2022-02-28 RX ORDER — HYDROCHLOROTHIAZIDE 25 MG/1
25 TABLET ORAL DAILY
Qty: 90 TABLET | Refills: 1 | Status: SHIPPED | OUTPATIENT
Start: 2022-02-28 | End: 2022-08-11 | Stop reason: SDUPTHER

## 2022-02-28 RX ORDER — ALBUTEROL SULFATE 90 UG/1
2 AEROSOL, METERED RESPIRATORY (INHALATION) EVERY 4 HOURS PRN
Qty: 8 G | Refills: 5 | Status: SHIPPED | OUTPATIENT
Start: 2022-02-28

## 2022-02-28 RX ORDER — METOPROLOL SUCCINATE 100 MG/1
100 TABLET, EXTENDED RELEASE ORAL DAILY
Qty: 90 TABLET | Refills: 1 | Status: SHIPPED | OUTPATIENT
Start: 2022-02-28 | End: 2022-08-11 | Stop reason: SDUPTHER

## 2022-02-28 RX ORDER — CYCLOBENZAPRINE HCL 10 MG
10 TABLET ORAL 3 TIMES DAILY PRN
Qty: 90 TABLET | Refills: 1 | Status: SHIPPED | OUTPATIENT
Start: 2022-02-28 | End: 2023-01-10

## 2022-02-28 RX ORDER — SUMATRIPTAN 50 MG/1
TABLET, FILM COATED ORAL
Qty: 12 TABLET | Refills: 1 | Status: SHIPPED | OUTPATIENT
Start: 2022-02-28 | End: 2022-05-23 | Stop reason: SDUPTHER

## 2022-02-28 RX ORDER — MONTELUKAST SODIUM 10 MG/1
10 TABLET ORAL NIGHTLY
Qty: 90 TABLET | Refills: 1 | Status: SHIPPED | OUTPATIENT
Start: 2022-02-28 | End: 2022-08-11 | Stop reason: SDUPTHER

## 2022-02-28 RX ORDER — IBUPROFEN 800 MG/1
800 TABLET ORAL EVERY 6 HOURS PRN
Qty: 90 TABLET | Refills: 3 | OUTPATIENT
Start: 2022-02-28 | End: 2022-07-04

## 2022-02-28 RX ORDER — EPINEPHRINE 0.3 MG/.3ML
0.3 INJECTION SUBCUTANEOUS ONCE
Qty: 1 EACH | Refills: 0 | Status: SHIPPED | OUTPATIENT
Start: 2022-02-28 | End: 2022-02-28

## 2022-02-28 NOTE — PROGRESS NOTES
Chief Complaint  Hypertension, Med Refill, and Headache    Subjective          Maureen Courtney presents to [facility]  History of Present Illness  She did have a reaction to shellfish about 3 months ago.  She had to use her inhaler but didn't use her epi pen.  She does also have a red meat and she needs a refill of her epi pen.  H/A: She is doing ok with her current med.  She is not having to take more meds.  HTN:  She did take her meds this AM.  She did smoke more  Back pain:  She is taking her flexeril for the back.    ASTHMA:  She is having to use the inhaler more since even getting covid.    She is ready to quit smoking.  She has been using some vaping but is ready to stop.  She can smoke at work and she has noticed she has been picking up.  She has smoked for 41 years.    She is still having the swallowing issues and the left ear is draining and bleeding.  She does wear ear plugs at work. S he has tried the peroxide.  She did have ear drops a few months ago with urgent care.  This has been going on and off for 30 + years.  Allergies  Beef allergy, Eggs or egg-derived products, Influenza virus vaccine, Pork allergy, Tetanus toxoid, Cephalexin, Coffee, Dust mite extract, Egg phospholipids, Erythromycin, Mixed grasses, Molds & smuts, Msg [monosodium glutamate], Pineapple, and Prednisone    Social History     Tobacco Use   • Smoking status: Current Every Day Smoker     Packs/day: 1.00     Years: 40.00     Pack years: 40.00     Types: Cigarettes     Start date: 1982   • Smokeless tobacco: Never Used   Vaping Use   • Vaping Use: Every day   • Substances: Flavoring   • Devices: Pre-filled or refillable cartridge   Substance Use Topics   • Alcohol use: Not Currently     Comment: Occasionally   • Drug use: Never       Family History   Problem Relation Age of Onset   • Hypertension Mother    • Thyroid disease Mother    • Hyperlipidemia Mother    • Osteoarthritis Mother    • Heart disease  "Mother    • Diabetes Father    • Stroke Father    • Skin cancer Father    • Heart disease Maternal Grandfather    • Cancer Other    • Asthma Other    • Heart disease Other    • Hyperlipidemia Other    • Heart failure Other    • Hypertension Other    • Diabetes Other         Unspecified   • Heart failure Sister 38        Downs Syndrome        Health Maintenance Due   Topic Date Due   • ANNUAL PHYSICAL  Never done   • Pneumococcal Vaccine 0-64 (1 of 2 - PPSV23) Never done   • Hepatitis B (1 of 3 - Risk 3-dose series) Never done   • TDAP/TD VACCINES (1 - Tdap) Never done   • HEPATITIS C SCREENING  Never done   • DIABETIC FOOT EXAM  Never done   • DIABETIC EYE EXAM  Never done   • ZOSTER VACCINE (1 of 2) Never done   • LUNG CANCER SCREENING  Never done   • URINE MICROALBUMIN  02/02/2022        Immunization History   Administered Date(s) Administered   • Influenza TIV (IM) 10/21/2014   • Influenza, Unspecified 10/21/2014       Review of Systems   Constitutional: Negative for fatigue.   Respiratory: Positive for cough. Negative for shortness of breath.    Cardiovascular: Negative for chest pain and leg swelling.   Gastrointestinal: Negative for constipation, diarrhea, nausea and vomiting.   Skin: Negative for rash.   Psychiatric/Behavioral: Negative for hallucinations and suicidal ideas.        Objective       Vitals:    02/28/22 0851 02/28/22 0858   BP: 147/80 140/88   Pulse: 74    Resp: 18    Temp: 97.9 °F (36.6 °C)    SpO2: 98%    Weight: 91.4 kg (201 lb 6.4 oz)    Height: 160 cm (63\")        Body mass index is 35.68 kg/m².         Physical Exam  Vitals reviewed.   Constitutional:       Appearance: Normal appearance. She is well-developed.   HENT:      Right Ear: There is impacted cerumen.      Left Ear: Decreased hearing noted. There is impacted cerumen.      Ears:      Comments: Cerumen impaction aquiles and there is crusting on the left ear canal.    Cardiovascular:      Rate and Rhythm: Normal rate and regular rhythm. "      Heart sounds: Normal heart sounds. No murmur heard.      Pulmonary:      Effort: Pulmonary effort is normal.      Breath sounds: Normal breath sounds.   Neurological:      Mental Status: She is alert and oriented to person, place, and time.      Cranial Nerves: No cranial nerve deficit.      Motor: No weakness.   Psychiatric:         Mood and Affect: Mood and affect normal.             Result Review :     The following data was reviewed by: LEONEL River on 02/28/2022:    Common labs    Common Labsle 12/6/21 12/6/21 12/6/21 12/6/21    0736 0736 0736 0736   Glucose   105 (A)    BUN   16    Creatinine   0.48 (A)    eGFR Non African Am   137    Sodium   144    Potassium   4.2    Chloride   108 (A)    Calcium   9.0    Albumin   4.30    Total Bilirubin   0.2    Alkaline Phosphatase   72    AST (SGOT)   22    ALT (SGPT)   21    WBC 8.55      Hemoglobin 16.2 (A)      Hematocrit 47.7 (A)      Platelets 127 (A)      Total Cholesterol    123   Triglycerides    65   HDL Cholesterol    46   LDL Cholesterol     63   Hemoglobin A1C  5.49     (A) Abnormal value            Most Recent A1C    HGBA1C Most Recent 12/6/21   Hemoglobin A1C 5.49                          Assessment and Plan      Diagnoses and all orders for this visit:    1. Primary hypertension (Primary)  -     metoprolol succinate XL (TOPROL-XL) 100 MG 24 hr tablet; Take 1 tablet by mouth Daily.  Dispense: 90 tablet; Refill: 1  -     hydroCHLOROthiazide (HYDRODIURIL) 25 MG tablet; Take 1 tablet by mouth Daily.  Dispense: 90 tablet; Refill: 1  -     Comprehensive Metabolic Panel  -     CBC & Differential  -     TSH  -     Lipid Panel    2. Intractable migraine without status migrainosus, unspecified migraine type  -     Rimegepant Sulfate (Nurtec) 75 MG tablet dispersible tablet; Take 1 tablet by mouth Daily As Needed (headache).  Dispense: 30 tablet; Refill: 1  -     SUMAtriptan (Imitrex) 50 MG tablet; Take one tablet at onset of headache. May repeat  dose one time in 2 hours if headache not relieved.  Dispense: 12 tablet; Refill: 1    3. Mild intermittent asthma without complication  -     albuterol sulfate  (90 Base) MCG/ACT inhaler; Inhale 2 puffs Every 4 (Four) Hours As Needed for Wheezing.  Dispense: 8 g; Refill: 5  -     montelukast (SINGULAIR) 10 MG tablet; Take 1 tablet by mouth Every Night.  Dispense: 90 tablet; Refill: 1    4. Chronic bilateral low back pain without sciatica  -     cyclobenzaprine (FLEXERIL) 10 MG tablet; Take 1 tablet by mouth 3 (Three) Times a Day As Needed for Muscle Spasms.  Dispense: 90 tablet; Refill: 1  -     ibuprofen (ADVIL,MOTRIN) 800 MG tablet; Take 1 tablet by mouth Every 6 (Six) Hours As Needed for Mild Pain .  Dispense: 90 tablet; Refill: 3    5. 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  Dispense: 1 each; Refill: 0  -     Allergens (7)Shellfish    6. Encounter for hepatitis C screening test for low risk patient  -     Hepatitis C antibody    7. Cigarette nicotine dependence without complication  -     nicotine (NICOTROL) 10 MG inhaler; Inhale 1 puff As Needed for Smoking Cessation.  Dispense: 168 each; Refill: 2    8. Screening mammography declined    9. Colon cancer screening declined    10. Left ear pain  -     Ambulatory Referral to ENT (Otolaryngology)    11. Dysphagia, unspecified type  -     Ambulatory Referral to ENT (Otolaryngology)            Follow Up     Return in about 3 months (around 5/28/2022) for 6 mth for lab and appt.  Follow-up in 3 months for smoking cessation and 6 labs and appt. Call with any concerns or questions that you may have regarding your medications or history.    We will check for shellfish allergy and discussed the poss with iodine/contrast.  Patient was given instructions and counseling regarding her condition or  for health maintenance advice. Please see specific information pulled into the AVS if appropriate.   We will refer to ENT for the swallowing and ear issues.  I did not clean the ears due to unable to see the TM fulling and there is crusting as if there is a hole in the ear drum on the left.    Maureen Courtney  reports that she has been smoking cigarettes. She started smoking about 40 years ago. She has a 40.00 pack-year smoking history. She has never used smokeless tobacco.. I have educated her on the risk of diseases from using tobacco products such as cancer, COPD and heart disease.     I advised her to quit and she is willing to quit. We have discussed the following method/s for tobacco cessation:  Education Material Counseling Prescription Medicaiton.  Together we have set a quit date for she is chewing gum and using some vapes and she will think of the date she wants.  She will follow up with me in 3 month or sooner to check on her progress.    I spent 5 minutes counseling the patient.           Lisseth Viveros, APRN  02/28/2022

## 2022-03-01 ENCOUNTER — TELEPHONE (OUTPATIENT)
Dept: FAMILY MEDICINE CLINIC | Facility: CLINIC | Age: 52
End: 2022-03-01

## 2022-03-01 NOTE — TELEPHONE ENCOUNTER
Caller: Maureen Courtney    Relationship: Self    Best call back number: [phone]    Caller requesting test results: SELF    What test was performed: BLOODWORK     When was the test performed: 02.28.2022

## 2022-03-01 NOTE — TELEPHONE ENCOUNTER
Patient wanted to know if allergy testing had returned. Advised patient they were still pending and we would contact her once they were finalized.

## 2022-03-04 LAB
BLUE MUSSEL IGE QN: <0.1 KU/L
CLAM IGE QN: <0.1 KU/L
CONV CLASS DESCRIPTION: NORMAL
CRAB IGE QN: <0.1 KU/L
LOBSTER IGE QN: <0.1 KU/L
OYSTER IGE QN: <0.1 KU/L
SCALLOP IGE QN: <0.1 KU/L
SHRIMP IGE QN: <0.1 KU/L

## 2022-03-17 ENCOUNTER — OFFICE VISIT (OUTPATIENT)
Dept: OTOLARYNGOLOGY | Facility: CLINIC | Age: 52
End: 2022-03-17

## 2022-03-17 VITALS — TEMPERATURE: 96.9 F | HEIGHT: 63 IN | WEIGHT: 203.2 LBS | BODY MASS INDEX: 36 KG/M2

## 2022-03-17 DIAGNOSIS — H60.62 CHRONIC OTITIS EXTERNA OF LEFT EAR, UNSPECIFIED TYPE: Primary | ICD-10-CM

## 2022-03-17 DIAGNOSIS — Z72.0 TOBACCO ABUSE: ICD-10-CM

## 2022-03-17 DIAGNOSIS — Z87.09 HISTORY OF VOCAL CORD PARALYSIS: ICD-10-CM

## 2022-03-17 DIAGNOSIS — R13.10 DYSPHAGIA, UNSPECIFIED TYPE: ICD-10-CM

## 2022-03-17 PROCEDURE — 31575 DIAGNOSTIC LARYNGOSCOPY: CPT | Performed by: NURSE PRACTITIONER

## 2022-03-17 PROCEDURE — 69210 REMOVE IMPACTED EAR WAX UNI: CPT | Performed by: NURSE PRACTITIONER

## 2022-03-17 PROCEDURE — 87070 CULTURE OTHR SPECIMN AEROBIC: CPT | Performed by: NURSE PRACTITIONER

## 2022-03-17 PROCEDURE — 99204 OFFICE O/P NEW MOD 45 MIN: CPT | Performed by: NURSE PRACTITIONER

## 2022-03-17 PROCEDURE — 87205 SMEAR GRAM STAIN: CPT | Performed by: NURSE PRACTITIONER

## 2022-03-17 RX ORDER — EPINEPHRINE 0.3 MG/.3ML
INJECTION SUBCUTANEOUS
COMMUNITY
Start: 2022-02-28

## 2022-03-17 RX ORDER — CIPROFLOXACIN AND DEXAMETHASONE 3; 1 MG/ML; MG/ML
3 SUSPENSION/ DROPS AURICULAR (OTIC) 3 TIMES DAILY
Qty: 7.5 ML | Refills: 0 | Status: SHIPPED | OUTPATIENT
Start: 2022-03-17 | End: 2022-03-24

## 2022-03-17 NOTE — PROGRESS NOTES
Patient Name: Maureen Courtney   Visit Date: 03/17/2022   Patient ID: [id number]  Provider: LEONEL Luque    Sex: female  Location: [facility]   YOB: 1970  Location Address: [address] [place] [address], [address],?[place]?[address]    Primary Care Provider Lisseth Viveros APRN  Location Phone: [phone]    Referring Provider: LEYDI River        Chief Complaint  Difficulty Swallowing and Left ear pain    Subjective   Maureen Courtney is a 51 y.o. female who presents to [facility] today as a consult from LEYDI River for evaluation of her left ear.  She states that for greater than 30 years she has had intermittent issues with otitis externa infections.  She states she has pain and drainage from the ear and decreased hearing.  She states that her ear has been draining a green substance over the past several days.  She states sometimes it occurs in the right ear but typically the left ear.    Patient also reports a history of vocal cord paralysis.  She states 9 years ago she had vocal cord augmentation and has done well.  She states over the past several months she has noticed that she feels like the muscles in her neck get tired with eating.  She is not having any choking episodes, difficulty swallowing or hoarseness.  She is a current smoker, smoking 1 pack/day.      Current Outpatient Medications on File Prior to Visit   Medication Sig   • albuterol (PROVENTIL) (2.5 MG/3ML) 0.083% nebulizer solution Take 2.5 mg by nebulization Every 4 (Four) Hours As Needed for Wheezing.   • albuterol sulfate  (90 Base) MCG/ACT inhaler Inhale 2 puffs Every 4 (Four) Hours As Needed for Wheezing.   • cetirizine (zyrTEC) 10 MG tablet Zyrtec 10 mg oral tablet take 1 tablet (10 mg) by oral route once daily   Active   • cyclobenzaprine (FLEXERIL) 10 MG tablet Take 1 tablet by mouth 3 (Three) Times a Day As Needed for Muscle Spasms.  "  • diphenhydrAMINE (BENADRYL) 25 mg capsule Benadryl 25 mg oral capsule take 1 capsule (25 mg) by oral route every 6 hours as needed   Active   • EPINEPHrine (EPIPEN) 0.3 MG/0.3ML solution auto-injector injection INJECT 0.3 ML SUB-Q ONE TIME AS DIRECTED   • hydroCHLOROthiazide (HYDRODIURIL) 25 MG tablet Take 1 tablet by mouth Daily.   • ibuprofen (ADVIL,MOTRIN) 800 MG tablet Take 1 tablet by mouth Every 6 (Six) Hours As Needed for Mild Pain .   • ipratropium (ATROVENT) 0.02 % nebulizer solution Take 500 mcg by nebulization As Needed.   • metoprolol succinate XL (TOPROL-XL) 100 MG 24 hr tablet Take 1 tablet by mouth Daily.   • montelukast (SINGULAIR) 10 MG tablet Take 1 tablet by mouth Every Night.   • Rimegepant Sulfate (Nurtec) 75 MG tablet dispersible tablet Take 1 tablet by mouth Daily As Needed (headache).   • SUMAtriptan (Imitrex) 50 MG tablet Take one tablet at onset of headache. May repeat dose one time in 2 hours if headache not relieved.   • nicotine (NICOTROL) 10 MG inhaler Inhale 1 puff As Needed for Smoking Cessation.     No current facility-administered medications on file prior to visit.        Social History     Tobacco Use   • Smoking status: Current Every Day Smoker     Packs/day: 1.00     Years: 40.00     Pack years: 40.00     Types: Cigarettes     Start date: 1982   • Smokeless tobacco: Never Used   Vaping Use   • Vaping Use: Every day   • Substances: Flavoring   • Devices: Pre-filled or refillable cartridge   Substance Use Topics   • Alcohol use: Not Currently     Comment: Occasionally   • Drug use: Never       Objective     Vital Signs:   Temp 96.9 °F (36.1 °C) (Temporal)   Ht 160 cm (63\")   Wt 92.2 kg (203 lb 3.2 oz)   BMI 36.00 kg/m²       Physical Exam  Constitutional:       General: She is not in acute distress.     Appearance: Normal appearance. She is not ill-appearing.   HENT:      Head: Normocephalic and atraumatic.      Jaw: There is normal jaw occlusion. No tenderness or pain on " movement.      Salivary Glands: Right salivary gland is not diffusely enlarged or tender. Left salivary gland is not diffusely enlarged or tender.      Right Ear: Tympanic membrane, ear canal and external ear normal.      Left Ear: Tympanic membrane, ear canal and external ear normal. Drainage present.      Nose: Nose normal. No septal deviation.      Right Sinus: No maxillary sinus tenderness or frontal sinus tenderness.      Left Sinus: No maxillary sinus tenderness or frontal sinus tenderness.      Mouth/Throat:      Lips: Pink. No lesions.      Mouth: Mucous membranes are moist. No oral lesions.      Dentition: Normal dentition.      Tongue: No lesions.      Palate: No mass and lesions.      Pharynx: Oropharynx is clear. Uvula midline.      Tonsils: No tonsillar exudate.   Eyes:      Extraocular Movements: Extraocular movements intact.      Conjunctiva/sclera: Conjunctivae normal.      Pupils: Pupils are equal, round, and reactive to light.   Neck:      Thyroid: No thyroid mass, thyromegaly or thyroid tenderness.      Trachea: Trachea normal.   Pulmonary:      Effort: Pulmonary effort is normal. No respiratory distress.   Musculoskeletal:         General: Normal range of motion.      Cervical back: Normal range of motion and neck supple. No tenderness.   Lymphadenopathy:      Cervical: No cervical adenopathy.   Skin:     General: Skin is warm and dry.   Neurological:      General: No focal deficit present.      Mental Status: She is alert and oriented to person, place, and time.      Cranial Nerves: No cranial nerve deficit.      Motor: No weakness.      Gait: Gait normal.   Psychiatric:         Mood and Affect: Mood normal.         Behavior: Behavior normal.         Thought Content: Thought content normal.         Judgment: Judgment normal.         Flexible laryngoscopy    Date/Time: 3/17/2022 1:30 PM  Performed by: Flora Kent APRN  Authorized by: Flora Kent APRN     Procedure details:      Indications: hoarseness, dysphagia, or aspiration      Medication:  Afrin    Instrument: flexible fiberoptic laryngoscope      Scope location: left nare    Nasal cavity:     Right inferior turbinates: normal      Left inferior turbinates: normal    Septum:     Findings: normal    Sinus/ Nasopharynx:     Left eustachian tube: normal    Oropharynx/ Supraglottis:     Posterior pharyngeal wall: normal      Oropharynx: normal      Vallecula: normal      Base of tongue: normal      Epiglottis: normal    Larynx/ Hypopharynx:     Arytenoids: normal      Hypopharynx: normal      Pyriform sinus: normal      False vocal cords: normal      True vocal cords: normal    Post-procedure details:     Patient tolerance of procedure:  Tolerated well    Ear Cerumen Removal    Date/Time: 3/17/2022 1:20 PM  Performed by: Flora Kent APRN  Authorized by: Flora Kent APRN     Anesthesia:  Local Anesthetic: none  Location details: left ear  Patient tolerance: patient tolerated the procedure well with no immediate complications  Procedure type: instrumentation, suction   Sedation:  Patient sedated: no             Result Review :              Assessment and Plan    Diagnoses and all orders for this visit:    1. Chronic otitis externa of left ear, unspecified type (Primary)  -     ciprofloxacin-dexamethasone (CIPRODEX) 0.3-0.1 % otic suspension; Administer 3 drops into the left ear 3 (Three) Times a Day for 7 days.  Dispense: 7.5 mL; Refill: 0  -     Wound Culture - Wound, Ear, Left; Future    2. Dysphagia, unspecified type    3. History of vocal cord paralysis    4. Tobacco abuse    Other orders  -     Flexible laryngoscopy  -     Ear Cerumen Removal    I did instill some Ciprodex drops in her left ear after suctioning the drainage.  Also obtained a culture.  Flexible laryngoscopy today was normal.  We discussed ordering swallow studies for further evaluation but she defers at this time.  I will see her back in 3 weeks for follow-up on  her ear.       Follow Up   No follow-ups on file.  Patient was given instructions and counseling regarding her condition or for health maintenance advice. Please see specific information pulled into the AVS if appropriate.      LEONEL Luque

## 2022-03-21 LAB
BACTERIA SPEC AEROBE CULT: NORMAL
GRAM STN SPEC: NORMAL

## 2022-04-19 ENCOUNTER — OFFICE VISIT (OUTPATIENT)
Dept: FAMILY MEDICINE CLINIC | Facility: CLINIC | Age: 52
End: 2022-04-19

## 2022-04-19 VITALS
BODY MASS INDEX: 35.95 KG/M2 | RESPIRATION RATE: 18 BRPM | OXYGEN SATURATION: 97 % | DIASTOLIC BLOOD PRESSURE: 86 MMHG | WEIGHT: 202.9 LBS | SYSTOLIC BLOOD PRESSURE: 152 MMHG | HEIGHT: 63 IN | TEMPERATURE: 97.5 F | HEART RATE: 76 BPM

## 2022-04-19 DIAGNOSIS — R30.9 PAINFUL URINATION: Primary | ICD-10-CM

## 2022-04-19 DIAGNOSIS — I10 PRIMARY HYPERTENSION: ICD-10-CM

## 2022-04-19 LAB
BILIRUB BLD-MCNC: NEGATIVE MG/DL
CLARITY, POC: ABNORMAL
COLOR UR: YELLOW
EXPIRATION DATE: ABNORMAL
GLUCOSE UR STRIP-MCNC: NEGATIVE MG/DL
KETONES UR QL: NEGATIVE
LEUKOCYTE EST, POC: ABNORMAL
Lab: ABNORMAL
NITRITE UR-MCNC: NEGATIVE MG/ML
PH UR: 7 [PH] (ref 5–8)
PROT UR STRIP-MCNC: NEGATIVE MG/DL
RBC # UR STRIP: ABNORMAL /UL
SP GR UR: 1.01 (ref 1–1.03)
UROBILINOGEN UR QL: NORMAL

## 2022-04-19 PROCEDURE — 81003 URINALYSIS AUTO W/O SCOPE: CPT | Performed by: NURSE PRACTITIONER

## 2022-04-19 PROCEDURE — 99214 OFFICE O/P EST MOD 30 MIN: CPT | Performed by: NURSE PRACTITIONER

## 2022-04-19 RX ORDER — SULFAMETHOXAZOLE AND TRIMETHOPRIM 800; 160 MG/1; MG/1
1 TABLET ORAL 2 TIMES DAILY
Qty: 20 TABLET | Refills: 0 | Status: SHIPPED | OUTPATIENT
Start: 2022-04-19 | End: 2022-04-29

## 2022-04-19 RX ORDER — LOSARTAN POTASSIUM 25 MG/1
25 TABLET ORAL DAILY
Qty: 90 TABLET | Refills: 1 | Status: SHIPPED | OUTPATIENT
Start: 2022-04-19 | End: 2023-01-10

## 2022-04-19 NOTE — PROGRESS NOTES
Chief Complaint  Difficulty Urinating (Irritation started Thursday by Friday painful urination ) and Blood in Urine (Started Friday afternoon )    Subjective          Maureen Courtney presents to [facility]  History of Present Illness  She is drinking at least 1 gallon of water if not more.  She started to have s/s about 1 week ago but then this am she started with pain and blood in the urine.  She is having discomfort.        Allergies  Beef allergy, Eggs or egg-derived products, Influenza virus vaccine, Pork allergy, Tetanus toxoid, Cephalexin, Coffee, Dust mite extract, Egg phospholipids, Erythromycin, Mixed grasses, Molds & smuts, Msg [monosodium glutamate], Pineapple, and Prednisone    Social History     Tobacco Use   • Smoking status: Current Every Day Smoker     Packs/day: 0.50     Years: 40.00     Pack years: 20.00     Types: Cigarettes     Start date: 1982   • Smokeless tobacco: Never Used   Vaping Use   • Vaping Use: Every day   • Substances: Flavoring   • Devices: Pre-filled or refillable cartridge   Substance Use Topics   • Alcohol use: Not Currently     Comment: Occasionally   • Drug use: Never       Family History   Problem Relation Age of Onset   • Hypertension Mother    • Thyroid disease Mother    • Hyperlipidemia Mother    • Osteoarthritis Mother    • Heart disease Mother    • Diabetes Father    • Stroke Father    • Skin cancer Father    • Heart disease Maternal Grandfather    • Cancer Other    • Asthma Other    • Heart disease Other    • Hyperlipidemia Other    • Heart failure Other    • Hypertension Other    • Diabetes Other         Unspecified   • Heart failure Sister 38        Downs Syndrome        Health Maintenance Due   Topic Date Due   • ANNUAL PHYSICAL  Never done   • DIABETIC FOOT EXAM  Never done   • DIABETIC EYE EXAM  Never done   • LUNG CANCER SCREENING  Never done   • URINE MICROALBUMIN  02/02/2022        Immunization History   Administered Date(s)  "Administered   • Influenza TIV (IM) 10/21/2014   • Influenza, Unspecified 10/21/2014       Review of Systems   Genitourinary: Positive for dysuria, frequency and urgency.        Objective       Vitals:    04/19/22 1301 04/19/22 1307   BP: 158/83 152/86   Pulse: 76    Resp: 18    Temp: 97.5 °F (36.4 °C)    SpO2: 97%    Weight: 92 kg (202 lb 14.4 oz)    Height: 160 cm (63\")        Body mass index is 35.94 kg/m².         Physical Exam  Vitals reviewed.   Constitutional:       Appearance: Normal appearance. She is well-developed.   Cardiovascular:      Rate and Rhythm: Normal rate and regular rhythm.      Heart sounds: Normal heart sounds. No murmur heard.  Pulmonary:      Effort: Pulmonary effort is normal.      Breath sounds: Normal breath sounds.   Abdominal:      Tenderness: There is no right CVA tenderness or left CVA tenderness.   Neurological:      Mental Status: She is alert and oriented to person, place, and time.      Cranial Nerves: No cranial nerve deficit.      Motor: No weakness.   Psychiatric:         Mood and Affect: Mood and affect normal.             Result Review :     The following data was reviewed by: LEONEL River on 04/19/2022:    UA    Urinalysis 7/16/21 4/19/22   Ketones, UA Negative Negative   Leukocytes, UA Small (1+) (A) Moderate (2+) (A)   (A) Abnormal value                           Assessment and Plan      Diagnoses and all orders for this visit:    1. Painful urination (Primary)  -     POCT urinalysis dipstick, automated  -     sulfamethoxazole-trimethoprim (Bactrim DS) 800-160 MG per tablet; Take 1 tablet by mouth 2 (Two) Times a Day for 10 days.  Dispense: 20 tablet; Refill: 0    2. Primary hypertension  -     losartan (Cozaar) 25 MG tablet; Take 1 tablet by mouth Daily.  Dispense: 90 tablet; Refill: 1            Follow Up     Return if symptoms worsen or fail to improve.  We will do abx for the UTI and if s/s do not get better we will do CT to check for stones. She did " take her med and she is still smoking.  We will add the losartan for tighter control of the BP.  Patient was given instructions and counseling regarding her condition or for health maintenance advice. Please see specific information pulled into the AVS if appropriate.     Maureen Courtney  reports that she has been smoking cigarettes. She started smoking about 40 years ago. She has a 20.00 pack-year smoking history. She has never used smokeless tobacco.. I have educated her on the risk of diseases from using tobacco products such as cancer, COPD and heart disease.     I advised her to quit and she is not willing to quit.      Lisseth Viveros, APRN  04/19/2022

## 2022-05-23 ENCOUNTER — OFFICE VISIT (OUTPATIENT)
Dept: FAMILY MEDICINE CLINIC | Facility: CLINIC | Age: 52
End: 2022-05-23

## 2022-05-23 VITALS
WEIGHT: 202.1 LBS | BODY MASS INDEX: 35.81 KG/M2 | SYSTOLIC BLOOD PRESSURE: 150 MMHG | RESPIRATION RATE: 16 BRPM | HEIGHT: 63 IN | TEMPERATURE: 97.7 F | OXYGEN SATURATION: 95 % | DIASTOLIC BLOOD PRESSURE: 80 MMHG | HEART RATE: 76 BPM

## 2022-05-23 DIAGNOSIS — G43.919 INTRACTABLE MIGRAINE WITHOUT STATUS MIGRAINOSUS, UNSPECIFIED MIGRAINE TYPE: ICD-10-CM

## 2022-05-23 DIAGNOSIS — D75.1 POLYCYTHEMIA: ICD-10-CM

## 2022-05-23 DIAGNOSIS — E83.119 HEMOCHROMATOSIS, UNSPECIFIED HEMOCHROMATOSIS TYPE: ICD-10-CM

## 2022-05-23 DIAGNOSIS — I10 PRIMARY HYPERTENSION: Primary | ICD-10-CM

## 2022-05-23 LAB
BASOPHILS # BLD AUTO: 0.04 10*3/MM3 (ref 0–0.2)
BASOPHILS NFR BLD AUTO: 0.5 % (ref 0–1.5)
DEPRECATED RDW RBC AUTO: 43.7 FL (ref 37–54)
EOSINOPHIL # BLD AUTO: 0.11 10*3/MM3 (ref 0–0.4)
EOSINOPHIL NFR BLD AUTO: 1.5 % (ref 0.3–6.2)
ERYTHROCYTE [DISTWIDTH] IN BLOOD BY AUTOMATED COUNT: 13 % (ref 12.3–15.4)
HCT VFR BLD AUTO: 51.1 % (ref 34–46.6)
HGB BLD-MCNC: 17.5 G/DL (ref 12–15.9)
IMM GRANULOCYTES # BLD AUTO: 0.02 10*3/MM3 (ref 0–0.05)
IMM GRANULOCYTES NFR BLD AUTO: 0.3 % (ref 0–0.5)
LYMPHOCYTES # BLD AUTO: 2.66 10*3/MM3 (ref 0.7–3.1)
LYMPHOCYTES NFR BLD AUTO: 36.4 % (ref 19.6–45.3)
MCH RBC QN AUTO: 31.3 PG (ref 26.6–33)
MCHC RBC AUTO-ENTMCNC: 34.2 G/DL (ref 31.5–35.7)
MCV RBC AUTO: 91.4 FL (ref 79–97)
MONOCYTES # BLD AUTO: 0.5 10*3/MM3 (ref 0.1–0.9)
MONOCYTES NFR BLD AUTO: 6.8 % (ref 5–12)
NEUTROPHILS NFR BLD AUTO: 3.97 10*3/MM3 (ref 1.7–7)
NEUTROPHILS NFR BLD AUTO: 54.5 % (ref 42.7–76)
NRBC BLD AUTO-RTO: 0.1 /100 WBC (ref 0–0.2)
PLATELET # BLD AUTO: 134 10*3/MM3 (ref 140–450)
PMV BLD AUTO: 13.9 FL (ref 6–12)
RBC # BLD AUTO: 5.59 10*6/MM3 (ref 3.77–5.28)
WBC NRBC COR # BLD: 7.3 10*3/MM3 (ref 3.4–10.8)

## 2022-05-23 PROCEDURE — 99214 OFFICE O/P EST MOD 30 MIN: CPT | Performed by: NURSE PRACTITIONER

## 2022-05-23 PROCEDURE — 85025 COMPLETE CBC W/AUTO DIFF WBC: CPT | Performed by: NURSE PRACTITIONER

## 2022-05-23 RX ORDER — SUMATRIPTAN 50 MG/1
TABLET, FILM COATED ORAL
Qty: 27 TABLET | Refills: 0 | Status: SHIPPED | OUTPATIENT
Start: 2022-05-23 | End: 2022-08-11 | Stop reason: SDUPTHER

## 2022-05-23 NOTE — PROGRESS NOTES
Chief Complaint  Hyperlipidemia, Headache, and Hypertension (3 month follow up/Refill imitrex )    Subjective          Maureen Courtney presents to [facility]  History of Present Illness  She is here for refills.   She has been itching and that happens when she is elevated H/h.  She is not smoking as much.  She is on day shift.  She is not sleeping that much.  She has been taking zquil and that is helping.  He is doing well with blood pressure and cholesterol.  She has had a headache for the last couple days and that is why she feels the blood pressure is elevated today.      Allergies  Beef allergy, Eggs or egg-derived products, Influenza virus vaccine, Pork allergy, Tetanus toxoid, Cephalexin, Coffee, Dust mite extract, Egg phospholipids, Erythromycin, Mixed grasses, Molds & smuts, Msg [monosodium glutamate], Pineapple, and Prednisone    Social History     Tobacco Use   • Smoking status: Current Every Day Smoker     Packs/day: 0.50     Years: 40.00     Pack years: 20.00     Types: Cigarettes     Start date: 1982   • Smokeless tobacco: Never Used   Vaping Use   • Vaping Use: Every day   • Substances: Flavoring   • Devices: Pre-filled or refillable cartridge   Substance Use Topics   • Alcohol use: Not Currently     Comment: Occasionally   • Drug use: Never       Family History   Problem Relation Age of Onset   • Hypertension Mother    • Thyroid disease Mother    • Hyperlipidemia Mother    • Osteoarthritis Mother    • Heart disease Mother    • Diabetes Father    • Stroke Father    • Skin cancer Father    • Heart disease Maternal Grandfather    • Cancer Other    • Asthma Other    • Heart disease Other    • Hyperlipidemia Other    • Heart failure Other    • Hypertension Other    • Diabetes Other         Unspecified   • Heart failure Sister 38        Downs Syndrome        Health Maintenance Due   Topic Date Due   • ANNUAL PHYSICAL  Never done   • COVID-19 Vaccine (1) Never done   • DIABETIC  "FOOT EXAM  Never done   • DIABETIC EYE EXAM  Never done   • ZOSTER VACCINE (1 of 2) Never done   • LUNG CANCER SCREENING  Never done   • URINE MICROALBUMIN  02/02/2022        Immunization History   Administered Date(s) Administered   • Influenza TIV (IM) 10/21/2014   • Influenza, Unspecified 10/21/2014       Review of Systems   Constitutional: Positive for fatigue.   Respiratory: Negative for cough and shortness of breath.    Cardiovascular: Negative for chest pain.   Gastrointestinal: Negative for diarrhea, nausea and vomiting.        Objective       Vitals:    05/23/22 0810 05/23/22 0815   BP: 164/83 150/80   Pulse: 76    Resp: 16    Temp: 97.7 °F (36.5 °C)    SpO2: 95%    Weight: 91.7 kg (202 lb 1.6 oz)    Height: 160 cm (63\")        Body mass index is 35.8 kg/m².         Physical Exam  Vitals reviewed.   Constitutional:       Appearance: Normal appearance. She is well-developed.   Cardiovascular:      Rate and Rhythm: Normal rate and regular rhythm.      Heart sounds: Normal heart sounds. No murmur heard.  Pulmonary:      Effort: Pulmonary effort is normal.      Breath sounds: Normal breath sounds.   Neurological:      Mental Status: She is alert and oriented to person, place, and time.      Cranial Nerves: No cranial nerve deficit.      Motor: No weakness.   Psychiatric:         Mood and Affect: Mood and affect normal.             Result Review :     The following data was reviewed by: LEONEL River on 05/23/2022:    Common labs    Common Labsle 12/6/21 12/6/21 12/6/21 12/6/21 2/28/22 2/28/22 2/28/22    0736 0736 0736 0736 0928 0928 0928   Glucose   105 (A)   99    BUN   16   16    Creatinine   0.48 (A)   0.52 (A)    eGFR Non African Am   137       Sodium   144   142    Potassium   4.2   4.1    Chloride   108 (A)   103    Calcium   9.0   9.0    Albumin   4.30   4.60    Total Bilirubin   0.2   0.3    Alkaline Phosphatase   72   69    AST (SGOT)   22   13    ALT (SGPT)   21   18    WBC 8.55    7.63   "   Hemoglobin 16.2 (A)    17.1 (A)     Hematocrit 47.7 (A)    49.7 (A)     Platelets 127 (A)    129 (A)     Total Cholesterol    123   122   Triglycerides    65   93   HDL Cholesterol    46   50   LDL Cholesterol     63   54   Hemoglobin A1C  5.49        (A) Abnormal value            Most Recent A1C    HGBA1C Most Recent 12/6/21   Hemoglobin A1C 5.49                          Assessment and Plan      Diagnoses and all orders for this visit:    1. Primary hypertension (Primary)  -     CBC & Differential    2. Intractable migraine without status migrainosus, unspecified migraine type  -     SUMAtriptan (Imitrex) 50 MG tablet; Take one tablet at onset of headache. May repeat dose one time in 2 hours if headache not relieved.  Dispense: 27 tablet; Refill: 0  -     CBC & Differential    3. Hemochromatosis, unspecified hemochromatosis type  -     CBC & Differential    4. Polycythemia  -     CBC & Differential            Follow Up     Return in about 3 months (around 8/23/2022).  Follow-up in 3 months for labs and appt. Call with any concerns or questions that you may have regarding your medications or history.    I have reviewed all medications and at this time no medications changes need to be adjusted for all chronic conditions.  Discussed to have you do blood draws with the [facility].  Call with any concerns or questions.  Patient was given instructions and counseling regarding her condition or for health maintenance advice. Please see specific information pulled into the AVS if appropriate.         Lisseth Viveros, APRN  05/23/2022

## 2022-08-11 ENCOUNTER — OFFICE VISIT (OUTPATIENT)
Dept: FAMILY MEDICINE CLINIC | Facility: CLINIC | Age: 52
End: 2022-08-11

## 2022-08-11 VITALS
HEART RATE: 69 BPM | DIASTOLIC BLOOD PRESSURE: 72 MMHG | WEIGHT: 189.7 LBS | SYSTOLIC BLOOD PRESSURE: 136 MMHG | TEMPERATURE: 98.4 F | BODY MASS INDEX: 33.61 KG/M2 | OXYGEN SATURATION: 95 % | HEIGHT: 63 IN

## 2022-08-11 DIAGNOSIS — I10 PRIMARY HYPERTENSION: Primary | ICD-10-CM

## 2022-08-11 DIAGNOSIS — M54.50 CHRONIC BILATERAL LOW BACK PAIN WITHOUT SCIATICA: ICD-10-CM

## 2022-08-11 DIAGNOSIS — G89.29 CHRONIC BILATERAL LOW BACK PAIN WITHOUT SCIATICA: ICD-10-CM

## 2022-08-11 DIAGNOSIS — J45.20 MILD INTERMITTENT ASTHMA WITHOUT COMPLICATION: ICD-10-CM

## 2022-08-11 DIAGNOSIS — E11.9 TYPE 2 DIABETES MELLITUS WITHOUT COMPLICATION, WITHOUT LONG-TERM CURRENT USE OF INSULIN: ICD-10-CM

## 2022-08-11 DIAGNOSIS — G43.919 INTRACTABLE MIGRAINE WITHOUT STATUS MIGRAINOSUS, UNSPECIFIED MIGRAINE TYPE: ICD-10-CM

## 2022-08-11 LAB
ALBUMIN SERPL-MCNC: 4.4 G/DL (ref 3.5–5.2)
ALBUMIN UR-MCNC: <1.2 MG/DL
ALBUMIN/GLOB SERPL: 1.8 G/DL
ALP SERPL-CCNC: 62 U/L (ref 39–117)
ALT SERPL W P-5'-P-CCNC: 16 U/L (ref 1–33)
ANION GAP SERPL CALCULATED.3IONS-SCNC: 10.1 MMOL/L (ref 5–15)
AST SERPL-CCNC: 15 U/L (ref 1–32)
BASOPHILS # BLD AUTO: 0.04 10*3/MM3 (ref 0–0.2)
BASOPHILS NFR BLD AUTO: 0.5 % (ref 0–1.5)
BILIRUB SERPL-MCNC: 0.3 MG/DL (ref 0–1.2)
BUN SERPL-MCNC: 12 MG/DL (ref 6–20)
BUN/CREAT SERPL: 20.3 (ref 7–25)
CALCIUM SPEC-SCNC: 9.3 MG/DL (ref 8.6–10.5)
CHLORIDE SERPL-SCNC: 105 MMOL/L (ref 98–107)
CHOLEST SERPL-MCNC: 117 MG/DL (ref 0–200)
CO2 SERPL-SCNC: 26.9 MMOL/L (ref 22–29)
CREAT SERPL-MCNC: 0.59 MG/DL (ref 0.57–1)
DEPRECATED RDW RBC AUTO: 43.3 FL (ref 37–54)
EGFRCR SERPLBLD CKD-EPI 2021: 110 ML/MIN/1.73
EOSINOPHIL # BLD AUTO: 0.06 10*3/MM3 (ref 0–0.4)
EOSINOPHIL NFR BLD AUTO: 0.7 % (ref 0.3–6.2)
ERYTHROCYTE [DISTWIDTH] IN BLOOD BY AUTOMATED COUNT: 13.1 % (ref 12.3–15.4)
GLOBULIN UR ELPH-MCNC: 2.5 GM/DL
GLUCOSE SERPL-MCNC: 100 MG/DL (ref 65–99)
HBA1C MFR BLD: 5.4 % (ref 4.8–5.6)
HCT VFR BLD AUTO: 44.7 % (ref 34–46.6)
HDLC SERPL-MCNC: 48 MG/DL (ref 40–60)
HGB BLD-MCNC: 15.1 G/DL (ref 12–15.9)
LDLC SERPL CALC-MCNC: 53 MG/DL (ref 0–100)
LDLC/HDLC SERPL: 1.1 {RATIO}
LYMPHOCYTES # BLD AUTO: 2.45 10*3/MM3 (ref 0.7–3.1)
LYMPHOCYTES NFR BLD AUTO: 28.4 % (ref 19.6–45.3)
MCH RBC QN AUTO: 30.8 PG (ref 26.6–33)
MCHC RBC AUTO-ENTMCNC: 33.8 G/DL (ref 31.5–35.7)
MCV RBC AUTO: 91.2 FL (ref 79–97)
MONOCYTES # BLD AUTO: 0.55 10*3/MM3 (ref 0.1–0.9)
MONOCYTES NFR BLD AUTO: 6.4 % (ref 5–12)
NEUTROPHILS NFR BLD AUTO: 5.49 10*3/MM3 (ref 1.7–7)
NEUTROPHILS NFR BLD AUTO: 63.5 % (ref 42.7–76)
PLATELET # BLD AUTO: 132 10*3/MM3 (ref 140–450)
PMV BLD AUTO: 14.5 FL (ref 6–12)
POTASSIUM SERPL-SCNC: 4.1 MMOL/L (ref 3.5–5.2)
PROT SERPL-MCNC: 6.9 G/DL (ref 6–8.5)
RBC # BLD AUTO: 4.9 10*6/MM3 (ref 3.77–5.28)
SODIUM SERPL-SCNC: 142 MMOL/L (ref 136–145)
T4 FREE SERPL-MCNC: 0.84 NG/DL (ref 0.93–1.7)
TRIGL SERPL-MCNC: 80 MG/DL (ref 0–150)
TSH SERPL DL<=0.05 MIU/L-ACNC: 0.81 UIU/ML (ref 0.27–4.2)
VLDLC SERPL-MCNC: 16 MG/DL (ref 5–40)
WBC NRBC COR # BLD: 8.63 10*3/MM3 (ref 3.4–10.8)

## 2022-08-11 PROCEDURE — 84439 ASSAY OF FREE THYROXINE: CPT | Performed by: NURSE PRACTITIONER

## 2022-08-11 PROCEDURE — 83036 HEMOGLOBIN GLYCOSYLATED A1C: CPT | Performed by: NURSE PRACTITIONER

## 2022-08-11 PROCEDURE — 80061 LIPID PANEL: CPT | Performed by: NURSE PRACTITIONER

## 2022-08-11 PROCEDURE — 80050 GENERAL HEALTH PANEL: CPT | Performed by: NURSE PRACTITIONER

## 2022-08-11 PROCEDURE — 99214 OFFICE O/P EST MOD 30 MIN: CPT | Performed by: NURSE PRACTITIONER

## 2022-08-11 PROCEDURE — 82043 UR ALBUMIN QUANTITATIVE: CPT | Performed by: NURSE PRACTITIONER

## 2022-08-11 RX ORDER — RIMEGEPANT SULFATE 75 MG/75MG
75 TABLET, ORALLY DISINTEGRATING ORAL DAILY PRN
Qty: 30 TABLET | Refills: 1 | Status: SHIPPED | OUTPATIENT
Start: 2022-08-11 | End: 2023-02-02 | Stop reason: SDUPTHER

## 2022-08-11 RX ORDER — METOPROLOL SUCCINATE 100 MG/1
100 TABLET, EXTENDED RELEASE ORAL DAILY
Qty: 90 TABLET | Refills: 1 | Status: SHIPPED | OUTPATIENT
Start: 2022-08-11 | End: 2023-02-02 | Stop reason: SDUPTHER

## 2022-08-11 RX ORDER — MONTELUKAST SODIUM 10 MG/1
10 TABLET ORAL NIGHTLY
Qty: 90 TABLET | Refills: 1 | Status: SHIPPED | OUTPATIENT
Start: 2022-08-11 | End: 2023-02-02 | Stop reason: SDUPTHER

## 2022-08-11 RX ORDER — SUMATRIPTAN 50 MG/1
TABLET, FILM COATED ORAL
Qty: 27 TABLET | Refills: 0 | Status: SHIPPED | OUTPATIENT
Start: 2022-08-11 | End: 2023-02-02 | Stop reason: SDUPTHER

## 2022-08-11 RX ORDER — HYDROCHLOROTHIAZIDE 25 MG/1
25 TABLET ORAL DAILY
Qty: 90 TABLET | Refills: 1 | Status: SHIPPED | OUTPATIENT
Start: 2022-08-11 | End: 2023-02-02 | Stop reason: SDUPTHER

## 2022-08-11 RX ORDER — IBUPROFEN 800 MG/1
800 TABLET ORAL EVERY 8 HOURS PRN
Qty: 270 TABLET | Refills: 1 | Status: SHIPPED | OUTPATIENT
Start: 2022-08-11 | End: 2023-02-02 | Stop reason: SDUPTHER

## 2022-08-11 NOTE — PROGRESS NOTES
Chief Complaint  Follow-up (3M follow up:/Primary hypertension/Intractable migraine without status migrainosus, unspecified migraine type/Hemochromatosis, unspecified hemochromatosis type/Polycythemia) and Med Refill    Subjective          Maureen Courtney presents to [facility]  History of Present Illness  H/A: She is doing ok with her current med.  She is not having to take more meds.  HTN:  She did take her meds this AM.  She did smoke more  Back pain:  She is taking her flexeril for the back.    ASTHMA:  She is having to use the inhaler more since even getting covid.  She is breathing better overall.   Migraine: She is doing well with her Imitrex and Nurtec.  She feels good overall.  She is working 7 days a week 10-hour days and even longer at times.  Dm: She used to be on medications but when she started losing her weight she has not been.  She is doing well overall with diet.  She has lost 13 more pounds since last appointment for refills.      Allergies  Beef allergy, Eggs or egg-derived products, Influenza virus vaccine, Pork allergy, Tetanus toxoid, Cephalexin, Coffee, Dust mite extract, Egg phospholipids, Erythromycin, Mixed grasses, Molds & smuts, Msg [monosodium glutamate], Pineapple, and Prednisone    Social History     Tobacco Use   • Smoking status: Current Every Day Smoker     Packs/day: 0.50     Years: 40.00     Pack years: 20.00     Types: Cigarettes     Start date: 1982   • Smokeless tobacco: Never Used   Vaping Use   • Vaping Use: Every day   • Substances: Flavoring   • Devices: Pre-filled or refillable cartridge   Substance Use Topics   • Alcohol use: Not Currently     Comment: Occasionally   • Drug use: Never       Family History   Problem Relation Age of Onset   • Hypertension Mother    • Thyroid disease Mother    • Hyperlipidemia Mother    • Osteoarthritis Mother    • Heart disease Mother    • Diabetes Father    • Stroke Father    • Skin cancer Father    • Heart  "failure Sister 38        Downs Syndrome   • Heart disease Maternal Grandfather    • Cancer Other    • Asthma Other    • Heart disease Other    • Hyperlipidemia Other    • Heart failure Other    • Hypertension Other    • Diabetes Other         Unspecified        Health Maintenance Due   Topic Date Due   • COVID-19 Vaccine (1) Never done   • ANNUAL PHYSICAL  Never done   • DIABETIC FOOT EXAM  Never done   • DIABETIC EYE EXAM  Never done   • ZOSTER VACCINE (1 of 2) Never done   • LUNG CANCER SCREENING  Never done   • URINE MICROALBUMIN  02/02/2022   • HEMOGLOBIN A1C  06/06/2022        Immunization History   Administered Date(s) Administered   • Influenza TIV (IM) 10/21/2014   • Influenza, Unspecified 10/21/2014       Review of Systems   Constitutional: Negative for fatigue.   Respiratory: Negative for cough and shortness of breath.    Cardiovascular: Negative for chest pain.   Gastrointestinal: Negative for diarrhea, nausea and vomiting.        Objective       Vitals:    08/11/22 0807 08/11/22 0814   BP: 149/69 136/72   BP Location: Left arm    Patient Position: Sitting    Cuff Size: Adult    Pulse: 69    Temp: 98.4 °F (36.9 °C)    TempSrc: Infrared    SpO2: 95%    Weight: 86 kg (189 lb 11.2 oz)    Height: 160 cm (63\")        Body mass index is 33.6 kg/m².         Physical Exam  Vitals reviewed.   Constitutional:       Appearance: Normal appearance. She is well-developed.   Cardiovascular:      Rate and Rhythm: Normal rate and regular rhythm.      Heart sounds: Normal heart sounds. No murmur heard.  Pulmonary:      Effort: Pulmonary effort is normal.      Breath sounds: Normal breath sounds.   Neurological:      Mental Status: She is alert and oriented to person, place, and time.      Cranial Nerves: No cranial nerve deficit.      Motor: No weakness.   Psychiatric:         Mood and Affect: Mood and affect normal.             Result Review :     The following data was reviewed by: LEONEL River on " 08/11/2022:    Common labs    Common Labsle 12/6/21 12/6/21 12/6/21 12/6/21 2/28/22 2/28/22 2/28/22 5/23/22    0736 0736 0736 0736 0928 0928 0928    Glucose   105 (A)   99     BUN   16   16     Creatinine   0.48 (A)   0.52 (A)     eGFR Non African Am   137        Sodium   144   142     Potassium   4.2   4.1     Chloride   108 (A)   103     Calcium   9.0   9.0     Albumin   4.30   4.60     Total Bilirubin   0.2   0.3     Alkaline Phosphatase   72   69     AST (SGOT)   22   13     ALT (SGPT)   21   18     WBC 8.55    7.63   7.30   Hemoglobin 16.2 (A)    17.1 (A)   17.5 (A)   Hematocrit 47.7 (A)    49.7 (A)   51.1 (A)   Platelets 127 (A)    129 (A)   134 (A)   Total Cholesterol    123   122    Triglycerides    65   93    HDL Cholesterol    46   50    LDL Cholesterol     63   54    Hemoglobin A1C  5.49         (A) Abnormal value            Most Recent A1C    HGBA1C Most Recent 12/6/21   Hemoglobin A1C 5.49                          Assessment and Plan      Diagnoses and all orders for this visit:    1. Primary hypertension (Primary)  -     hydroCHLOROthiazide (HYDRODIURIL) 25 MG tablet; Take 1 tablet by mouth Daily.  Dispense: 90 tablet; Refill: 1  -     metoprolol succinate XL (TOPROL-XL) 100 MG 24 hr tablet; Take 1 tablet by mouth Daily.  Dispense: 90 tablet; Refill: 1  -     CBC & Differential  -     Comprehensive Metabolic Panel  -     TSH  -     Lipid Panel  -     T4, Free    2. Intractable migraine without status migrainosus, unspecified migraine type  -     SUMAtriptan (Imitrex) 50 MG tablet; Take one tablet at onset of headache. May repeat dose one time in 2 hours if headache not relieved.  Dispense: 27 tablet; Refill: 0  -     Rimegepant Sulfate (Nurtec) 75 MG tablet dispersible tablet; Take 1 tablet by mouth Daily As Needed (headache).  Dispense: 30 tablet; Refill: 1    3. Mild intermittent asthma without complication  -     montelukast (SINGULAIR) 10 MG tablet; Take 1 tablet by mouth Every Night.  Dispense: 90  tablet; Refill: 1    4. Chronic bilateral low back pain without sciatica  -     ibuprofen (ADVIL,MOTRIN) 800 MG tablet; Take 1 tablet by mouth Every 8 (Eight) Hours As Needed for Mild Pain .  Dispense: 270 tablet; Refill: 1    5. Type 2 diabetes mellitus without complication, without long-term current use of insulin ([facility])  -     CBC & Differential  -     Comprehensive Metabolic Panel  -     TSH  -     Lipid Panel  -     Hemoglobin A1c  -     MicroAlbumin, Urine, Random - Urine, Clean Catch            Follow Up     Return in about 6 months (around 2/11/2023).  Pt may need her losartan before I see her again in 6 months.  Call and we will refill.    Patient was given instructions and counseling regarding her condition or for health maintenance advice. Please see specific information pulled into the AVS if appropriate.     Maureen Courtney  reports that she has been smoking cigarettes. She started smoking about 40 years ago. She has a 20.00 pack-year smoking history. She has never used smokeless tobacco.. I have educated her on the risk of diseases from using tobacco products such as cancer, COPD and heart disease.     I advised her to quit and she is not willing to quit.  She has cut back on smoking.  She does not smoke in her house.  She has been working a lot of hours so she really has not been smoking that much but does still smoke currently.         Lisseth Viveros, APRN  08/11/2022

## 2022-11-15 ENCOUNTER — TREATMENT (OUTPATIENT)
Dept: PHYSICAL THERAPY | Facility: CLINIC | Age: 52
End: 2022-11-15

## 2022-11-15 ENCOUNTER — TRANSCRIBE ORDERS (OUTPATIENT)
Dept: PHYSICAL THERAPY | Facility: CLINIC | Age: 52
End: 2022-11-15

## 2022-11-15 DIAGNOSIS — M25.661 KNEE STIFFNESS, RIGHT: ICD-10-CM

## 2022-11-15 DIAGNOSIS — R26.2 DIFFICULTY WALKING: ICD-10-CM

## 2022-11-15 DIAGNOSIS — S80.01XD CONTUSION OF RIGHT KNEE, SUBSEQUENT ENCOUNTER: Primary | ICD-10-CM

## 2022-11-15 DIAGNOSIS — S80.01XA CONTUSION OF RIGHT KNEE, INITIAL ENCOUNTER: Primary | ICD-10-CM

## 2022-11-15 DIAGNOSIS — M25.561 RIGHT KNEE PAIN, UNSPECIFIED CHRONICITY: ICD-10-CM

## 2022-11-15 PROCEDURE — 97161 PT EVAL LOW COMPLEX 20 MIN: CPT | Performed by: PHYSICAL THERAPIST

## 2022-11-15 PROCEDURE — 97110 THERAPEUTIC EXERCISES: CPT | Performed by: PHYSICAL THERAPIST

## 2022-11-15 PROCEDURE — 97116 GAIT TRAINING THERAPY: CPT | Performed by: PHYSICAL THERAPIST

## 2022-11-15 NOTE — PROGRESS NOTES
"  Physical Therapy Initial Evaluation and Plan of Care                    [facility] [address] [place] [address]    Patient: Maureen Courtney   : 1970  Diagnosis/ICD-10 Code:  Contusion of right knee, subsequent encounter [S80.01XD]  Referring practitioner: JAKE Lorenz  Date of Initial Visit: 11/15/2022  Today's Date: 11/15/2022  Patient seen for 1 sessions           Subjective Questionnaire: LEFS:  = 25% Function      Subjective Evaluation    History of Present Illness  Mechanism of injury: The patient presents to physical therapy with complaints of right knee pain that started when she fell when she tripped over a \"T\" shaped hoist at work and landed on her right knee on 10/14/22. 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 Initially, she couldn't walk on her knee due to pain with weight bearing. It still hurts to bear full weight through her right leg and she ambulates with a single crutch. She has had x-rays and an MRI of her knee, both of which were negative. She is still off work as she is on \"sit down only\" restrictions. She feels like her condition is improving. Her pain is located today mainly over the location of VMO and around the bottom of her kneecap. Her pain is improved with rest. She is taking an anti-inflammatory, but isn't sure how much it's helping. Her pain is increased with weight bearing, with putting pressure over the middle of her knee, squatting, and with bending/straightening her knee. She has noticed numbness from the middle part of her knee into her shin if she has been standing for too long.      Patient Occupation: [employer] -  Pain  Current pain ratin  At best pain rating: 3  At worst pain ratin    Diagnostic Tests  X-ray: normal  MRI studies: normal    Patient Goals  Patient goal: The patient would like to have less pain, be able to return to work, and return to exercise at the gym.           Objective          Tenderness     Additional Tenderness " Details  Tenderness in right knee over VMO, medial patellar border, hip adductor muscle group, medial hamstring, and proximal gastrocnemius.    Neurological Testing     Additional Neurological Details  Sensation to light touch intact and equal bilaterally from L2-S1 except for mild hyposensation in right over medial knee joint    Active Range of Motion     Right Knee   Flexion: 82 degrees   Extension: 5 (lacking) degrees     Passive Range of Motion     Right Knee   Flexion: 90 degrees   Extension: 0 degrees     Patellar Mobility   Left Knee Patellar tendons within functional limits include the medial, lateral, superior and inferior.     Right Knee Patellar tendons within functional limits include the medial, lateral, superior and inferior.     Strength/Myotome Testing     Left Hip   Planes of Motion   Flexion: 4+    Right Hip   Planes of Motion   Flexion: 3+    Left Knee   Quadriceps contraction: good    Right Knee   Flexion: 4-  Extension: 2+  Quadriceps contraction: fair    Ambulation     Ambulation: Level Surfaces     Additional Level Surfaces Ambulation Details  The patient ambulates with a single axillary crutch in her left upper extremity with a 2-1 gait pattern.      See Exercise, Manual, and Modality Logs for complete treatment.     Assessment & Plan     Assessment  Impairments: abnormal gait, abnormal muscle firing, abnormal or restricted ROM, activity intolerance, impaired balance, impaired physical strength, lacks appropriate home exercise program, pain with function, safety issue and weight-bearing intolerance  Functional Limitations: walking, uncomfortable because of pain, moving in bed, standing and stooping  Assessment details: The patient presents to physical therapy with complaints of right knee pain that has been persistent since she tripped at work and landed on her knee on 10/14/22. She presents with associated right knee stiffness, weakness, tenderness to palpation, antalgic gait, and functional  deficits (LEFS). She would benefit from skilled physical therapy as described below to address these limitations and allow the patient to return to her prior level of function.   Prognosis: good    Goals  Plan Goals: KNEE PROBLEMS:     1. The patient has limited ROM of the right knee.   LTG 1: 12 weeks:  The patient will demonstrate 0 to 140 degrees of ROM for the right knee in order to allow patient to walk, ascend/descend stairs, and return to sports without limitation.    STATUS:  New   STG 1a: 6 weeks:  The patient will demonstrate 0 to 120 degrees of ROM for the right knee.    STATUS:  New    2. The patient has limited strength of the right knee.   LTG 2: 12 weeks: The patient will demonstrate 5 /5 strength for right knee flexion and extension in order to allow patient improved joint stability    STATUS:  New   STG 2a: 6 weeks: The patient will demonstrate 4+ /5 strength for right knee flexion and extension    STATUS:  New      3. The patient has gait dysfunction.   LTG 3: 12 weeks:  The patient will ambulate without assistive device, independently, for community distances with normal biomechanics of the right lower extremity in order to improve mobility and allow patient to perform activities such as grocery shopping with greater ease.    STATUS:  New    4. The patient has limited strength of the left hip.   LTG 4: 12 weeks: The patient will demonstrate 5 /5 strength for right hip flexion, abduction, and extension in order to allow patient improved joint stability    STATUS:  New   STG 4a: 6 weeks: The patient will demonstrate 4+ /5 strength for right hip flexion, abduction, and extension    STATUS:  New     5. Mobility: Walking/Moving Around Functional Limitation     LTG 5: 12 weeks:  The patient will demonstrate 25 % limitation by achieving a score of 60/80 on the LEFS.    STATUS:  New   STG 5 a: 6 weeks:  The patient will demonstrate 37.5 % limitation by achieving a score of 50/80 on the LEFS.       STATUS:   New    Plan  Therapy options: will be seen for skilled therapy services  Planned modality interventions: cryotherapy, electrical stimulation/Russian stimulation and TENS  Planned therapy interventions: balance/weight-bearing training, ADL retraining, soft tissue mobilization, strengthening, stretching, therapeutic activities, joint mobilization, home exercise program, gait training, functional ROM exercises, flexibility, body mechanics training, postural training, neuromuscular re-education and manual therapy  Frequency: 3x week  Duration in weeks: 12  Treatment plan discussed with: patient        Visit Diagnoses:    ICD-10-CM ICD-9-CM   1. Contusion of right knee, subsequent encounter  S80.01XD V58.89     924.11   2. Right knee pain, unspecified chronicity  M25.561 719.46   3. Knee stiffness, right  M25.661 719.56   4. Difficulty walking  R26.2 719.7       History # of Personal Factors and/or Comorbidities: MODERATE (1-2)  Examination of Body System(s): # of elements: LOW (1-2)  Clinical Presentation: STABLE   Clinical Decision Making: LOW       Timed:         Manual Therapy:    0     mins  54135;     Therapeutic Exercise:    10     mins  80994;     Neuromuscular Una:    0    mins  73549;    Therapeutic Activity:     0     mins  56989;     Gait Trainin     mins  72972;     Ultrasound:     0     mins  13067;    Ionto                               0    mins   07637  Self Care                       0     mins   23371  Canalith Repos    0     mins 67781      Un-Timed:  Electrical Stimulation:    0     mins  90727 ( );  Dry Needling     0     mins self-pay  Traction     0     mins 17655  Low Eval     15     Mins  76552  Mod Eval     0     Mins  97772  High Eval                       0     Mins  02991  Re-Eval                           0    mins  78420    Timed Treatment:   18   mins   Total Treatment:     43   mins    PT SIGNATURE: Wil Medina PT    Electronically signed 11/15/2022    [place] License: CECILIO  - [id number]      Initial Certification  Certification Period: 11/15/2022 thru 2/12/2023  I certify that the therapy services are furnished while this patient is under my care.  The services outlined above are required by this patient, and will be reviewed every 90 days.     PHYSICIAN: Loi Nicholson PA  NPI: [id number]      DATE:     Please sign and return via fax to [phone]. Thank you, [facility].

## 2022-11-16 ENCOUNTER — TREATMENT (OUTPATIENT)
Dept: PHYSICAL THERAPY | Facility: CLINIC | Age: 52
End: 2022-11-16

## 2022-11-16 DIAGNOSIS — M25.661 KNEE STIFFNESS, RIGHT: ICD-10-CM

## 2022-11-16 DIAGNOSIS — S80.01XD CONTUSION OF RIGHT KNEE, SUBSEQUENT ENCOUNTER: Primary | ICD-10-CM

## 2022-11-16 DIAGNOSIS — M25.561 RIGHT KNEE PAIN, UNSPECIFIED CHRONICITY: ICD-10-CM

## 2022-11-16 DIAGNOSIS — R26.2 DIFFICULTY WALKING: ICD-10-CM

## 2022-11-16 PROCEDURE — 97140 MANUAL THERAPY 1/> REGIONS: CPT | Performed by: PHYSICAL THERAPIST

## 2022-11-16 PROCEDURE — 97530 THERAPEUTIC ACTIVITIES: CPT | Performed by: PHYSICAL THERAPIST

## 2022-11-16 PROCEDURE — 97110 THERAPEUTIC EXERCISES: CPT | Performed by: PHYSICAL THERAPIST

## 2022-11-16 NOTE — PROGRESS NOTES
Physical Therapy Daily Treatment Note      Patient: Maureen Courtney   : 1970  Referring practitioner: No ref. provider found  Date of Initial Visit: Type: THERAPY  Noted: 11/15/2022  Today's Date: 2022  Patient seen for 2 sessions           Subjective Questionnaire:       Subjective Evaluation    History of Present Illness    Subjective comment: Pt presents to clinic with single axillary crutch  on [place] report of 5/10 R knee pain.       Objective   See Exercise, Manual, and Modality Logs for complete treatment.       Assessment & Plan     Assessment    Assessment details: Pt reports having pain with stance phase in R medial quad.  Pt reports her pain is at R medial quad and calf.  Pt ambulates with single axillary crutch on L.  Pt reported strengthening exercise with pain.         Visit Diagnoses:    ICD-10-CM ICD-9-CM   1. Contusion of right knee, subsequent encounter  S80.01XD V58.89     924.11   2. Right knee pain, unspecified chronicity  M25.561 719.46   3. Knee stiffness, right  M25.661 719.56   4. Difficulty walking  R26.2 719.7       Progress per Plan of Care and Progress strengthening /stabilization /functional activity           Timed:  Manual Therapy:    8     mins  57915;  Therapeutic Exercise:    14     mins  14162;     Neuromuscular Una:        mins  41984;    Therapeutic Activity:     8     mins  40044;     Gait Training:           mins  40244;     Ultrasound:          mins  70316;    Electrical Stimulation:         mins  49136 ( );  Aquatic Therapy          mins  78020    Untimed:  Electrical Stimulation:         mins  23780 ( );  Mechanical Traction:         mins  44945;     Timed Treatment:   30   mins   Total Treatment:     30   mins    Electronically signed    Melody Fuentes PTA  Physical Therapist Assistant    [employer] license: [id number]

## 2022-11-18 ENCOUNTER — TREATMENT (OUTPATIENT)
Dept: PHYSICAL THERAPY | Facility: CLINIC | Age: 52
End: 2022-11-18

## 2022-11-18 DIAGNOSIS — M25.561 RIGHT KNEE PAIN, UNSPECIFIED CHRONICITY: ICD-10-CM

## 2022-11-18 DIAGNOSIS — M25.661 KNEE STIFFNESS, RIGHT: ICD-10-CM

## 2022-11-18 DIAGNOSIS — S80.01XD CONTUSION OF RIGHT KNEE, SUBSEQUENT ENCOUNTER: Primary | ICD-10-CM

## 2022-11-18 DIAGNOSIS — R26.2 DIFFICULTY WALKING: ICD-10-CM

## 2022-11-18 PROCEDURE — 97140 MANUAL THERAPY 1/> REGIONS: CPT | Performed by: PHYSICAL THERAPIST

## 2022-11-18 PROCEDURE — 97110 THERAPEUTIC EXERCISES: CPT | Performed by: PHYSICAL THERAPIST

## 2022-11-18 PROCEDURE — 97530 THERAPEUTIC ACTIVITIES: CPT | Performed by: PHYSICAL THERAPIST

## 2022-11-18 NOTE — PROGRESS NOTES
Physical Therapy Daily Treatment Note      Patient: Maureen Courtney   : 1970  Referring practitioner: No ref. provider found  Date of Initial Visit: No linked episodes  Today's Date: 2022  Patient seen for Visit count could not be calculated. Make sure you are using a visit which is associated with an episode. sessions           Subjective Questionnaire:       Subjective Evaluation    History of Present Illness    Subjective comment: Pt reported R medial knee pain 5-6/10.  Pt reports every step feels like she feels like the skin g peeling.         Objective          Active Range of Motion     Right Knee   Flexion: 101 degrees   Extension: 0 degrees     Passive Range of Motion     Right Knee   Flexion: 114 degrees   Extension: 0 degrees       See Exercise, Manual, and Modality Logs for complete treatment.       Assessment & Plan     Assessment    Assessment details: Pt ambulates with single axillary crutch on L.  Pt  Tolerated strengthening well.  Pt did well with improving R knee flexion.  Continue with POC.        Visit Diagnoses:  No diagnosis found.    Progress per Plan of Care and Progress strengthening /stabilization /functional activity         1  Timed:  Manual Therapy:  8       mins  62714;  Therapeutic Exercise:    12   mins  90797;     Neuromuscular Una:        mins  42435;    Therapeutic Activity:     8     mins  85658;     Gait Training:           mins  89240;     Ultrasound:          mins  59802;    Electrical Stimulation:         mins  95077 ( );  Aquatic Therapy          mins  75890    Untimed:  Electrical Stimulation:         mins  84458 ( );  Mechanical Traction:         mins  34236;     Timed Treatment:   28   mins   Total Treatment:     28   mins    Electronically signed    Melody Fuentes PTA  Physical Therapist Assistant    [employer] license: [id number]

## 2022-11-21 ENCOUNTER — TREATMENT (OUTPATIENT)
Dept: PHYSICAL THERAPY | Facility: CLINIC | Age: 52
End: 2022-11-21

## 2022-11-21 DIAGNOSIS — M25.561 RIGHT KNEE PAIN, UNSPECIFIED CHRONICITY: ICD-10-CM

## 2022-11-21 DIAGNOSIS — R26.2 DIFFICULTY WALKING: ICD-10-CM

## 2022-11-21 DIAGNOSIS — M25.661 KNEE STIFFNESS, RIGHT: ICD-10-CM

## 2022-11-21 DIAGNOSIS — S80.01XD CONTUSION OF RIGHT KNEE, SUBSEQUENT ENCOUNTER: Primary | ICD-10-CM

## 2022-11-21 PROCEDURE — 97110 THERAPEUTIC EXERCISES: CPT | Performed by: PHYSICAL THERAPIST

## 2022-11-21 PROCEDURE — 97140 MANUAL THERAPY 1/> REGIONS: CPT | Performed by: PHYSICAL THERAPIST

## 2022-11-21 PROCEDURE — 97530 THERAPEUTIC ACTIVITIES: CPT | Performed by: PHYSICAL THERAPIST

## 2022-11-21 NOTE — PROGRESS NOTES
Physical Therapy Daily Treatment Note      Patient: Maureen Courtney   : 1970  Referring practitioner: No ref. provider found  Date of Initial Visit: Type: THERAPY  Noted: 11/15/2022  Today's Date: 2022  Patient seen for 4 sessions           Subjective Questionnaire:       Subjective Evaluation    History of Present Illness    Subjective comment: Pt reports 3/10       Objective   See Exercise, Manual, and Modality Logs for complete treatment.       Assessment & Plan     Assessment    Assessment details: Pt ambulates with single axillary crutch and was advised by MD to use it until she returns to see him on 22.  Pt tolerated session well.  Pt's R knee active flexion is 112 degrees and passive range is 122 degrees today; extension is 0 degrees.  Continue with POC.        Visit Diagnoses:    ICD-10-CM ICD-9-CM   1. Contusion of right knee, subsequent encounter  S80.01XD V58.89     924.11   2. Right knee pain, unspecified chronicity  M25.561 719.46   3. Knee stiffness, right  M25.661 719.56   4. Difficulty walking  R26.2 719.7       Progress per Plan of Care and Progress strengthening /stabilization /functional activity           Timed:  Manual Therapy:    8     mins  47200;  Therapeutic Exercise:    14     mins  72500;     Neuromuscular Una:        mins  08794;    Therapeutic Activity:     8     mins  54934;     Gait Training:           mins  01255;     Ultrasound:          mins  90027;    Electrical Stimulation:         mins  18413 ( );  Aquatic Therapy          mins  47804    Untimed:  Electrical Stimulation:         mins  73511 ( );  Mechanical Traction:         mins  57432;     Timed Treatment:   30   mins   Total Treatment:     30   mins    Electronically signed    Melody Fuentes PTA  Physical Therapist Assistant    [employer] license: [id number]

## 2022-11-23 ENCOUNTER — TREATMENT (OUTPATIENT)
Dept: PHYSICAL THERAPY | Facility: CLINIC | Age: 52
End: 2022-11-23

## 2022-11-23 DIAGNOSIS — M25.661 KNEE STIFFNESS, RIGHT: ICD-10-CM

## 2022-11-23 DIAGNOSIS — S80.01XD CONTUSION OF RIGHT KNEE, SUBSEQUENT ENCOUNTER: Primary | ICD-10-CM

## 2022-11-23 DIAGNOSIS — M25.561 RIGHT KNEE PAIN, UNSPECIFIED CHRONICITY: ICD-10-CM

## 2022-11-23 DIAGNOSIS — R26.2 DIFFICULTY WALKING: ICD-10-CM

## 2022-11-23 PROCEDURE — 97110 THERAPEUTIC EXERCISES: CPT | Performed by: PHYSICAL THERAPIST

## 2022-11-23 PROCEDURE — 97530 THERAPEUTIC ACTIVITIES: CPT | Performed by: PHYSICAL THERAPIST

## 2022-11-23 PROCEDURE — 97140 MANUAL THERAPY 1/> REGIONS: CPT | Performed by: PHYSICAL THERAPIST

## 2022-11-23 NOTE — PROGRESS NOTES
Physical Therapy Daily Treatment Note      Patient: Maureen Courtney   : 1970  Referring practitioner: JAKE Lorenz  Date of Initial Visit: Type: THERAPY  Noted: 11/15/2022  Today's Date: 2022  Patient seen for 5 sessions           Subjective Questionnaire:       Subjective Evaluation    History of Present Illness    Subjective comment: Pt reports she continues to have pain 5/10 this morning at [employer] and woke it her up last night.  Pt reports sitting more than half an hour it is hurtful to stand and sitting in the car yesterday she went for a long car ride.Pain  Current pain ratin           Objective   See Exercise, Manual, and Modality Logs for complete treatment.       Assessment & Plan     Assessment    Assessment details: Pt reports pain with lifting her leg up to rowena pants.  Pt reports pain with sit to stand transfer after sitting for 30 minutes or more.  Pt is to see MD on 22.  Pt reports R hip pain with SLR and reports toes 1 and 2 start to get numb.  Pt reports that when shopping before getting finished her toes get numb.  Pt exhibits improvement in strength and tolerance to ambulation reporting minimal pain at lateral R knee and distal quads and is less dependent on single axillary crutch.        Visit Diagnoses:    ICD-10-CM ICD-9-CM   1. Contusion of right knee, subsequent encounter  S80.01XD V58.89     924.11   2. Right knee pain, unspecified chronicity  M25.561 719.46   3. Knee stiffness, right  M25.661 719.56   4. Difficulty walking  R26.2 719.7       Progress per Plan of Care and Progress strengthening /stabilization /functional activity           Timed:  Manual Therapy:    8     mins  44834;  Therapeutic Exercise:    14     mins  88691;     Neuromuscular Una:        mins  54715;    Therapeutic Activity:     8     mins  45430;     Gait Training:           mins  64372;     Ultrasound:          mins  54649;    Electrical Stimulation:         mins  19081 ( );  Aquatic  Therapy          mins  95827    Untimed:  Electrical Stimulation:         mins  97576 ( );  Mechanical Traction:         mins  59548;     Timed Treatment:   30   mins   Total Treatment:     30   mins    Electronically signed    Melody Fuentes PTA  Physical Therapist Assistant    [employer] license: [id number]

## 2022-11-28 ENCOUNTER — TREATMENT (OUTPATIENT)
Dept: PHYSICAL THERAPY | Facility: CLINIC | Age: 52
End: 2022-11-28

## 2022-11-28 DIAGNOSIS — M25.561 RIGHT KNEE PAIN, UNSPECIFIED CHRONICITY: ICD-10-CM

## 2022-11-28 DIAGNOSIS — R26.2 DIFFICULTY WALKING: ICD-10-CM

## 2022-11-28 DIAGNOSIS — M25.661 KNEE STIFFNESS, RIGHT: ICD-10-CM

## 2022-11-28 DIAGNOSIS — S80.01XD CONTUSION OF RIGHT KNEE, SUBSEQUENT ENCOUNTER: Primary | ICD-10-CM

## 2022-11-28 PROCEDURE — 97530 THERAPEUTIC ACTIVITIES: CPT | Performed by: PHYSICAL THERAPIST

## 2022-11-28 PROCEDURE — 97110 THERAPEUTIC EXERCISES: CPT | Performed by: PHYSICAL THERAPIST

## 2022-11-30 ENCOUNTER — TREATMENT (OUTPATIENT)
Dept: PHYSICAL THERAPY | Facility: CLINIC | Age: 52
End: 2022-11-30

## 2022-11-30 DIAGNOSIS — S80.01XD CONTUSION OF RIGHT KNEE, SUBSEQUENT ENCOUNTER: Primary | ICD-10-CM

## 2022-11-30 DIAGNOSIS — R26.2 DIFFICULTY WALKING: ICD-10-CM

## 2022-11-30 DIAGNOSIS — M25.661 KNEE STIFFNESS, RIGHT: ICD-10-CM

## 2022-11-30 DIAGNOSIS — M25.561 RIGHT KNEE PAIN, UNSPECIFIED CHRONICITY: ICD-10-CM

## 2022-11-30 PROCEDURE — 97140 MANUAL THERAPY 1/> REGIONS: CPT | Performed by: PHYSICAL THERAPIST

## 2022-11-30 PROCEDURE — 97110 THERAPEUTIC EXERCISES: CPT | Performed by: PHYSICAL THERAPIST

## 2022-11-30 PROCEDURE — 97530 THERAPEUTIC ACTIVITIES: CPT | Performed by: PHYSICAL THERAPIST

## 2022-11-30 NOTE — PROGRESS NOTES
Physical Therapy Daily Treatment Note      Patient: Maureen Courtney   : 1970  Referring practitioner: JAKE Lorenz  Date of Initial Visit: Type: THERAPY  Noted: 11/15/2022  Today's Date: 2022  Patient seen for 7 sessions           Subjective Questionnaire:       Subjective Evaluation    History of Present Illness    Subjective comment: Pt reports 5/10 R medial quad pain stating it really hasn't changed much during the last two days.  Pt reports she has an appointment with Dr. Velez on Friday.Pain  Current pain ratin           Objective   See Exercise, Manual, and Modality Logs for complete treatment.       Assessment & Plan     Assessment    Assessment details: Pt's L knee active flexion is 124 degrees and passive L knee flexion is 134 degrees with pain.  Pt continues with LLE weakness and pain at medial quads.  Pt tolerates strengthening exercises well.  Pt will see Dr. Velez on Friday, 22.  Continue with POC.         Visit Diagnoses:    ICD-10-CM ICD-9-CM   1. Contusion of right knee, subsequent encounter  S80.01XD V58.89     924.11   2. Right knee pain, unspecified chronicity  M25.561 719.46   3. Knee stiffness, right  M25.661 719.56   4. Difficulty walking  R26.2 719.7       Progress per Plan of Care and Progress strengthening /stabilization /functional activity           Timed:  Manual Therapy:    8     mins  86624;  Therapeutic Exercise:    15     mins  51336;     Neuromuscular Una:        mins  77510;    Therapeutic Activity:     8     mins  90286;     Gait Training:           mins  49005;     Ultrasound:          mins  53804;    Electrical Stimulation:         mins  73762 ( );  Aquatic Therapy          mins  40886    Untimed:  Electrical Stimulation:         mins  44104 ( );  Mechanical Traction:         mins  03613;     Timed Treatment:   31   mins   Total Treatment:     31   mins    Electronically signed    Melody Fuentes PTA  Physical Therapist  Assistant    [place]PT[place] license: [id number]

## 2022-12-02 ENCOUNTER — TREATMENT (OUTPATIENT)
Dept: PHYSICAL THERAPY | Facility: CLINIC | Age: 52
End: 2022-12-02

## 2022-12-02 ENCOUNTER — OFFICE VISIT (OUTPATIENT)
Dept: ORTHOPEDIC SURGERY | Facility: CLINIC | Age: 52
End: 2022-12-02

## 2022-12-02 VITALS — WEIGHT: 197 LBS | HEIGHT: 63 IN | BODY MASS INDEX: 34.91 KG/M2

## 2022-12-02 DIAGNOSIS — S86.911D STRAIN OF RIGHT KNEE, SUBSEQUENT ENCOUNTER: ICD-10-CM

## 2022-12-02 DIAGNOSIS — M25.661 KNEE STIFFNESS, RIGHT: ICD-10-CM

## 2022-12-02 DIAGNOSIS — M25.561 RIGHT KNEE PAIN, UNSPECIFIED CHRONICITY: ICD-10-CM

## 2022-12-02 DIAGNOSIS — S80.01XD CONTUSION OF RIGHT KNEE, SUBSEQUENT ENCOUNTER: Primary | ICD-10-CM

## 2022-12-02 DIAGNOSIS — R26.2 DIFFICULTY WALKING: ICD-10-CM

## 2022-12-02 DIAGNOSIS — M25.561 RIGHT KNEE PAIN, UNSPECIFIED CHRONICITY: Primary | ICD-10-CM

## 2022-12-02 DIAGNOSIS — M17.11 PRIMARY OSTEOARTHRITIS OF RIGHT KNEE: ICD-10-CM

## 2022-12-02 PROBLEM — S86.911A STRAIN OF RIGHT KNEE: Status: ACTIVE | Noted: 2022-12-02

## 2022-12-02 PROCEDURE — 97110 THERAPEUTIC EXERCISES: CPT | Performed by: PHYSICAL THERAPIST

## 2022-12-02 PROCEDURE — 99213 OFFICE O/P EST LOW 20 MIN: CPT | Performed by: ORTHOPAEDIC SURGERY

## 2022-12-02 PROCEDURE — 97530 THERAPEUTIC ACTIVITIES: CPT | Performed by: PHYSICAL THERAPIST

## 2022-12-02 NOTE — PROGRESS NOTES
"Chief Complaint  Initial Evaluation of the Right Knee     Subjective      Maureen Courtney presents to [facility] for evaluation of the right knee. The patient fell at work when walking when she tripped over something injuring her right knee. She reports pain with flexion. She is ambulating with a crutch. She locates pain to the anterior medial knee. She reports she gets numbness down to her toes. She is attending physical therapy. She reports she keeps her leg extended while sitting. Injury date was on 10/14/22.  She is allergic to steroids and eggs.     Allergies   Allergen Reactions   • Beef Allergy Unknown - Low Severity and Anaphylaxis   • Eggs Or Egg-Derived Products Shortness Of Breath   • Influenza Virus Vaccine Anaphylaxis   • Pork Allergy Anaphylaxis   • Tetanus Toxoid Anaphylaxis   • Cephalexin Hives   • Coffee Itching   • Dust Mite Extract Unknown - Low Severity   • Egg Phospholipids Itching   • Erythromycin Hives   • Mixed Grasses Unknown - Low Severity   • Molds & Smuts Unknown - Low Severity   • Msg [Monosodium Glutamate] Unknown - Low Severity   • Pineapple Itching   • Prednisone Itching     CANNOT GET PREDNISONE SHOT- CAN TAKE PILL FORM        Social History     Socioeconomic History   • Marital status:    Tobacco Use   • Smoking status: Every Day     Packs/day: 0.50     Years: 40.00     Pack years: 20.00     Types: Cigarettes     Start date: 1982   • Smokeless tobacco: Never   Vaping Use   • Vaping Use: Every day   • Substances: Flavoring   • Devices: Pre-filled or refillable cartridge   Substance and Sexual Activity   • Alcohol use: Not Currently     Comment: Occasionally   • Drug use: Never   • Sexual activity: Defer        Review of Systems     Objective   Vital Signs:   Ht 160 cm (63\")   Wt 89.4 kg (197 lb)   BMI 34.90 kg/m²       Physical Exam  Constitutional:       Appearance: Normal appearance. The patient is well-developed and normal weight.   HENT:      " Head: Normocephalic.      Right Ear: Hearing and external ear normal.      Left Ear: Hearing and external ear normal.      Nose: Nose normal.   Eyes:      Conjunctiva/sclera: Conjunctivae normal.   Cardiovascular:      Rate and Rhythm: Normal rate.   Pulmonary:      Effort: Pulmonary effort is normal.      Breath sounds: No wheezing or rales.   Abdominal:      Palpations: Abdomen is soft.      Tenderness: There is no abdominal tenderness.   Musculoskeletal:      Cervical back: Normal range of motion.   Skin:     Findings: No rash.   Neurological:      Mental Status: The patient is alert and oriented to person, place, and time.   Psychiatric:         Mood and Affect: Mood and affect normal.         Judgment: Judgment normal.       Ortho Exam      Right knee- Tender to the medial joint line and distal femur. ROM -5 to 115 degrees. Stable to varus/valgus stress. Stable to anterior/posterior drawer. Negative Lachman's. Positive EHL, FHL, GS and TA. Sensation intact to all 5 nerves of the foot. Positive pulses. Negative Giovanny's.     Procedures     X-Ray Report:  Right knee(s) X-Ray  Indication: Evaluation of right knee pain  AP/Lateral, Standing and Sunrise view(s)  Findings: no acute fracture. Mild to moderate degenerative changes. Mild medial joint space narrowing. Small tricompartmental  Osteophytes. Enthesophyte to superior patella and tibial tubercle. No significant effusion.   Prior studies available for comparison: yes     Strum MRI- IMPRESSION: 1. Normal menisci. 2. No cruciate or collateral ligament tears. 3. Suspected mild strain versus contusion at the distal   vastus medialis musculotendinous complex. 4. Chondromalacia of the patella and medial femoral condyle, detailed above. 5. No acute   osseous abnormality.    Imaging Results (Most Recent)     Procedure Component Value Units Date/Time    XR Knee 3 View Right [[id number]] Resulted: 12/02/22 1401     Updated: 12/02/22 1403           Result Review :        No results found.           Assessment and Plan     Diagnoses and all orders for this visit:    1. Right knee pain, unspecified chronicity (Primary)  -     XR Knee 3 View Right    2. Strain of right knee, subsequent encounter    3. Primary osteoarthritis of right knee        Discussed the treatment plan with the patient.  I reviewed the x-rays that were obtained today with the patient. Plan for conservative treatment at this time. She can not have steroid injections or Visco injections. Plan to seek approval for PRP injection. Plan to continue physical therapy. Work note with restrictions given today using the brace as needed.     Educated on risk of smoking. Discussed options for smoking cessation. and Call or return if worsening symptoms.    Follow Up     For injection.       Patient was given instructions and counseling regarding her condition or for health maintenance advice. Please see specific information pulled into the AVS if appropriate.     Scribed for Yang Velez MD by Cherelle Heard.  12/02/22   14:13 EST    I have personally performed the services described in this document as scribed by the above individual and it is both accurate and complete. Yang Velez MD 12/04/22

## 2022-12-02 NOTE — PROGRESS NOTES
Progress Note  [facility] [address] [place] [address]      Patient: Maureen Courtney   : 1970  Diagnosis/ICD-10 Code:  Contusion of right knee, subsequent encounter [S80.01XD]  Referring practitioner: JAKE Lorenz  Date of Initial Visit: Type: THERAPY  Noted: 11/15/2022  Today's Date: 2022  Patient seen for 8 sessions      Subjective:   Subjective Questionnaire: LEFS:  = 27.5% Function  Clinical Progress: improved  Home Program Compliance: Yes  Treatment has included: therapeutic exercise, manual therapy and therapeutic activity    Subjective   The patient reported that her pain level is a 5/10 today. Her right knee is  to the touch and stays swollen on the middle side. Since starting PT she has noticed improvements in her ability to go up/down stairs, walk, and bend her knee. However, her pain is fairly unchanged. Also, she is still walking with a single axillary crutch. She follows up with ortho this afternoon. She still hasn't returned to work.     Objective          Tenderness     Additional Tenderness Details  Tenderness in right knee over VMO, medial patellar border, hip adductor muscle group, medial hamstring, and proximal gastrocnemius.    Neurological Testing     Additional Neurological Details  Sensation to light touch intact and equal bilaterally from L2-S1 except for mild hyposensation in right over medial knee joint, medial shin and first phalanx    Active Range of Motion     Right Knee   Flexion: 112 degrees   Extension: 2 (lacking) degrees     Passive Range of Motion     Right Knee   Flexion: 130 degrees   Extension: 0 degrees     Patellar Mobility   Left Knee Patellar tendons within functional limits include the medial, lateral, superior and inferior.     Right Knee Patellar tendons within functional limits include the medial, lateral, superior and inferior.     Strength/Myotome Testing     Left Hip   Planes of Motion   Flexion: 4+    Right Hip   Planes  of Motion   Flexion: 4-    Left Knee   Quadriceps contraction: good    Right Knee   Flexion: 4  Extension: 4-  Quadriceps contraction: good    Ambulation     Ambulation: Level Surfaces     Additional Level Surfaces Ambulation Details  The patient ambulates with a single axillary crutch in her left upper extremity with a 2-1 gait pattern.      See Exercise, Manual, and Modality Logs for complete treatment.     Assessment & Plan     Assessment    Assessment details: The patient was re-evaluated today and presents with improvements in right knee ROM, right knee strength, and function (LEFS) compare to her initial evaluation. Although improved in these areas, she continues to present with medial knee pain and numbness in her medial shin. She would benefit from continued PT at this time to address remaining functional deficits and to allow the patient to return to work.    Goals  Plan Goals: KNEE PROBLEMS:     1. The patient has limited ROM of the right knee.              LTG 1: 12 weeks:  The patient will demonstrate 0 to 140 degrees of ROM for the right knee in order to allow patient to walk, ascend/descend stairs, and return to sports without limitation.                          STATUS:  Progressing              STG 1a: 6 weeks:  The patient will demonstrate 0 to 120 degrees of ROM for the right knee.                          STATUS:  Met    2. The patient has limited strength of the right knee.              LTG 2: 12 weeks: The patient will demonstrate 5 /5 strength for right knee flexion and extension in order to allow patient improved joint stability                          STATUS:   Progressing              STG 2a: 6 weeks: The patient will demonstrate 4+ /5 strength for right knee flexion and extension                          STATUS:   Progressing                3. The patient has gait dysfunction.              LTG 3: 12 weeks:  The patient will ambulate without assistive device, independently, for community  distances with normal biomechanics of the right lower extremity in order to improve mobility and allow patient to perform activities such as grocery shopping with greater ease.                          STATUS:   Progressing    4. The patient has limited strength of the left hip.              LTG 4: 12 weeks: The patient will demonstrate 5 /5 strength for right hip flexion, abduction, and extension in order to allow patient improved joint stability                          STATUS:   Progressing              STG 4a: 6 weeks: The patient will demonstrate 4+ /5 strength for right hip flexion, abduction, and extension                          STATUS:   Progressing    5. Mobility: Walking/Moving Around Functional Limitation                               LTG 5: 12 weeks:  The patient will demonstrate 25 % limitation by achieving a score of 60/80 on the LEFS.                          STATUS:   Progressing              STG 5 a: 6 weeks:  The patient will demonstrate 37.5 % limitation by achieving a score of 50/80 on the LEFS.                             STATUS:  Progressing      Progress toward previous goals: Partially Met      Recommendations: Continue as planned  Timeframe: 1 month  Prognosis to achieve goals: good    PT Signature: Wil Medina PT    Electronically signed 2022    [place] License: PT - [id number]       Timed:  Manual Therapy:    0     mins  83590;  Therapeutic Exercise:    23     mins  40746;     Neuromuscular Una:    0    mins  56399;    Therapeutic Activity:     10     mins  97660;     Gait Trainin     mins  27781;     Aquatics                         0      mins  57595    Un-timed:  Mechanical Traction      0     mins  41817  Dry Needling     0     mins self-pay  Electrical Stimulation:    0     mins  81187 ( );    Timed Treatment:   33   mins   Total Treatment:     33   mins

## 2022-12-05 ENCOUNTER — TELEPHONE (OUTPATIENT)
Dept: ORTHOPEDIC SURGERY | Facility: CLINIC | Age: 52
End: 2022-12-05

## 2022-12-05 NOTE — TELEPHONE ENCOUNTER
Tried calling patient to verify what type of insurance she is using with our office regarding her knee. Listed in Epic is [employer]iy[employer] but I see work comp scanned in. I was just verifying before I sent a request for authorization for a PRP injection in her Right knee.

## 2022-12-06 ENCOUNTER — OFFICE VISIT (OUTPATIENT)
Dept: FAMILY MEDICINE CLINIC | Facility: CLINIC | Age: 52
End: 2022-12-06

## 2022-12-06 VITALS
TEMPERATURE: 97.6 F | RESPIRATION RATE: 16 BRPM | SYSTOLIC BLOOD PRESSURE: 158 MMHG | HEART RATE: 78 BPM | DIASTOLIC BLOOD PRESSURE: 84 MMHG | HEIGHT: 63 IN | WEIGHT: 196.3 LBS | OXYGEN SATURATION: 96 % | BODY MASS INDEX: 34.78 KG/M2

## 2022-12-06 DIAGNOSIS — G43.919 INTRACTABLE MIGRAINE WITHOUT STATUS MIGRAINOSUS, UNSPECIFIED MIGRAINE TYPE: Primary | ICD-10-CM

## 2022-12-06 DIAGNOSIS — I10 PRIMARY HYPERTENSION: ICD-10-CM

## 2022-12-06 PROCEDURE — 99213 OFFICE O/P EST LOW 20 MIN: CPT | Performed by: NURSE PRACTITIONER

## 2022-12-06 NOTE — PROGRESS NOTES
Chief Complaint  Follow-up and follw up FMLA    Subjective          Maureen Courtney presents to [facility]  History of Present Illness  She is here for her [facility] papers.  She is having headaches about 1-2 times a month at times and then sometimes she doesn't need time. She is going back to work tomorrow but it is on work comp regarding that.    She is taking her meds overall.        Allergies  Beef allergy, Eggs or egg-derived products, Influenza virus vaccine, Pork allergy, Tetanus toxoid, Cephalexin, Coffee, Dust mite extract, Egg phospholipids, Erythromycin, Mixed grasses, Molds & smuts, Msg [monosodium glutamate], Pineapple, and Prednisone    Social History     Tobacco Use   • Smoking status: Every Day     Packs/day: 0.50     Years: 40.00     Pack years: 20.00     Types: Cigarettes     Start date: 1982   • Smokeless tobacco: Never   Vaping Use   • Vaping Use: Every day   • Substances: Flavoring   • Devices: Pre-filled or refillable cartridge   Substance Use Topics   • Alcohol use: Not Currently     Comment: Occasionally   • Drug use: Never       Family History   Problem Relation Age of Onset   • Hypertension Mother    • Thyroid disease Mother    • Hyperlipidemia Mother    • Osteoarthritis Mother    • Heart disease Mother    • Diabetes Father    • Stroke Father    • Skin cancer Father    • Heart failure Sister 38        Downs Syndrome   • Heart disease Maternal Grandfather    • Cancer Other    • Asthma Other    • Heart disease Other    • Hyperlipidemia Other    • Heart failure Other    • Hypertension Other    • Diabetes Other         Unspecified        Health Maintenance Due   Topic Date Due   • DIABETIC FOOT EXAM  Never done   • DIABETIC EYE EXAM  Never done   • ZOSTER VACCINE (1 of 2) Never done   • LUNG CANCER SCREENING  Never done        Immunization History   Administered Date(s) Administered   • Influenza TIV (IM) 10/21/2014   • Influenza, Unspecified 10/21/2014       Review of  "Systems   Cardiovascular: Negative for chest pain.   Gastrointestinal: Negative for diarrhea, nausea and vomiting.   Neurological: Positive for headache.        Objective       Vitals:    12/06/22 0906 12/06/22 0909   BP: 152/87 158/84   Pulse: 78    Resp: 16    Temp: 97.6 °F (36.4 °C)    SpO2: 96%    Weight: 89 kg (196 lb 4.8 oz)    Height: 160 cm (63\")        Body mass index is 34.77 kg/m².         Physical Exam  Vitals reviewed.   Constitutional:       Appearance: Normal appearance. She is well-developed.   Cardiovascular:      Rate and Rhythm: Normal rate and regular rhythm.      Heart sounds: Normal heart sounds. No murmur heard.  Pulmonary:      Effort: Pulmonary effort is normal.      Breath sounds: Normal breath sounds.   Neurological:      Mental Status: She is alert and oriented to person, place, and time.      Cranial Nerves: No cranial nerve deficit.      Motor: No weakness.   Psychiatric:         Mood and Affect: Mood and affect normal.             Result Review :     The following data was reviewed by: LEONEL River on 12/06/2022:                     Assessment and Plan      Diagnoses and all orders for this visit:    1. Intractable migraine without status migrainosus, unspecified migraine type (Primary)    2. Primary hypertension            Follow Up     Return for 3-6 months (appt is scheduled 2/2/23).  I did fill out papers.  She is aware if the headaches get worse she will need to let me know.  She has her Appointment with me already scheduled for 2/2/23.  Patient was given instructions and counseling regarding her condition or for health maintenance advice. Please see specific information pulled into the AVS if appropriate.         LEONEL River  12/06/2022  "

## 2022-12-07 ENCOUNTER — TREATMENT (OUTPATIENT)
Dept: PHYSICAL THERAPY | Facility: CLINIC | Age: 52
End: 2022-12-07

## 2022-12-07 DIAGNOSIS — M25.561 RIGHT KNEE PAIN, UNSPECIFIED CHRONICITY: ICD-10-CM

## 2022-12-07 DIAGNOSIS — S80.01XD CONTUSION OF RIGHT KNEE, SUBSEQUENT ENCOUNTER: Primary | ICD-10-CM

## 2022-12-07 DIAGNOSIS — M25.661 KNEE STIFFNESS, RIGHT: ICD-10-CM

## 2022-12-07 DIAGNOSIS — R26.2 DIFFICULTY WALKING: ICD-10-CM

## 2022-12-07 PROCEDURE — 97110 THERAPEUTIC EXERCISES: CPT | Performed by: PHYSICAL THERAPIST

## 2022-12-07 PROCEDURE — 97530 THERAPEUTIC ACTIVITIES: CPT | Performed by: PHYSICAL THERAPIST

## 2022-12-07 NOTE — PROGRESS NOTES
Physical Therapy Daily Treatment Note      Patient: Maureen Courtney   : 1970  Referring practitioner: JAKE Lorenz  Date of Initial Visit: Type: THERAPY  Noted: 11/15/2022  Today's Date: 2022  Patient seen for 9 sessions           Subjective Questionnaire:       Subjective Evaluation    History of Present Illness    Subjective comment: Pt reports 55/10 R knee pain.  Pt is wearing a short velcro brace and is no longer required to use single axillary crutch.       Objective   See Exercise, Manual, and Modality Logs for complete treatment.       Assessment & Plan     Assessment    Assessment details: Pt saw Dr. Velez on Friday and was released from single axillary crutch and released back to work on light duty.  Pt reports she is on sit down duty for now.  Pt c/o  Medial knee pain with Cybex hip adduction and tolerated other strengthening exercise with pain.        Visit Diagnoses:    ICD-10-CM ICD-9-CM   1. Contusion of right knee, subsequent encounter  S80.01XD V58.89     924.11   2. Right knee pain, unspecified chronicity  M25.561 719.46   3. Knee stiffness, right  M25.661 719.56   4. Difficulty walking  R26.2 719.7       Progress per Plan of Care and Progress strengthening /stabilization /functional activity           Timed:  Manual Therapy:         mins  70453;  Therapeutic Exercise:    22     mins  33662;     Neuromuscular Una:        mins  38139;    Therapeutic Activity:     8      mins  45579;     Gait Training:           mins  09918;     Ultrasound:          mins  16309;    Electrical Stimulation:         mins  75474 ( );  Aquatic Therapy          mins  44771    Untimed:  Electrical Stimulation:         mins  53989 ( );  Mechanical Traction:         mins  91645;     Timed Treatment:  30    mins   Total Treatment:     30    mins    Electronically signed    Melody Fuentes PTA  Physical Therapist Assistant    [facility] license: [id number]

## 2022-12-09 ENCOUNTER — TREATMENT (OUTPATIENT)
Dept: PHYSICAL THERAPY | Facility: CLINIC | Age: 52
End: 2022-12-09

## 2022-12-09 DIAGNOSIS — M25.561 RIGHT KNEE PAIN, UNSPECIFIED CHRONICITY: ICD-10-CM

## 2022-12-09 DIAGNOSIS — S80.01XD CONTUSION OF RIGHT KNEE, SUBSEQUENT ENCOUNTER: Primary | ICD-10-CM

## 2022-12-09 DIAGNOSIS — R26.2 DIFFICULTY WALKING: ICD-10-CM

## 2022-12-09 DIAGNOSIS — M25.661 KNEE STIFFNESS, RIGHT: ICD-10-CM

## 2022-12-09 PROCEDURE — 97530 THERAPEUTIC ACTIVITIES: CPT | Performed by: PHYSICAL THERAPIST

## 2022-12-09 PROCEDURE — 97140 MANUAL THERAPY 1/> REGIONS: CPT | Performed by: PHYSICAL THERAPIST

## 2022-12-09 PROCEDURE — 97110 THERAPEUTIC EXERCISES: CPT | Performed by: PHYSICAL THERAPIST

## 2022-12-09 NOTE — PROGRESS NOTES
Physical Therapy Daily Treatment Note      Patient: Maureen Courtney   : 1970  Referring practitioner: JAKE Lorenz  Date of Initial Visit: Type: THERAPY  Noted: 11/15/2022  Today's Date: 2022  Patient seen for 10 sessions           Subjective Questionnaire:       Subjective Evaluation    History of Present Illness    Subjective comment: Pt presents to clinic wearing work boots, R knee brace and report of 6/10 R knee pain.       Objective   See Exercise, Manual, and Modality Logs for complete treatment.       Assessment & Plan     Assessment    Assessment details: Pt reports increased pain still getting used to being back at work.  Pt tolerated progressed strengthening well.  Pt's R knee active flexion 121 degrees and extension to 0 degress with quad set. Pt saw Dr. Velez  And reports he advised her to continue with PT however did not send an order.  Pt reports she is waiting for her workman's comp person to call her back on approving them.  Pt's R knee strength: flexion 4; extension 4-.  Pt will benefit from continued PT.        Visit Diagnoses:    ICD-10-CM ICD-9-CM   1. Contusion of right knee, subsequent encounter  S80.01XD V58.89     924.11   2. Right knee pain, unspecified chronicity  M25.561 719.46   3. Knee stiffness, right  M25.661 719.56   4. Difficulty walking  R26.2 719.7       Progress per Plan of Care and Progress strengthening /stabilization /functional activity           Timed:  Manual Therapy:    8     mins  36025;  Therapeutic Exercise:    14     mins  06182;     Neuromuscular Una:        mins  17346;    Therapeutic Activity:     8     mins  44132;     Gait Training:           mins  21871;     Ultrasound:          mins  77030;    Electrical Stimulation:         mins  24837 ( );  Aquatic Therapy          mins  07071    Untimed:  Electrical Stimulation:         mins  18988 ( );  Mechanical Traction:         mins  73680;     Timed Treatment:  30    mins   Total  Treatment:     30   mins    Electronically signed    Melody Fuentes PTA  Physical Therapist Assistant    [employer] license: [id number]

## 2022-12-16 ENCOUNTER — TELEPHONE (OUTPATIENT)
Dept: ORTHOPEDIC SURGERY | Facility: CLINIC | Age: 52
End: 2022-12-16

## 2022-12-19 ENCOUNTER — TREATMENT (OUTPATIENT)
Dept: PHYSICAL THERAPY | Facility: CLINIC | Age: 52
End: 2022-12-19

## 2022-12-19 DIAGNOSIS — R26.2 DIFFICULTY WALKING: ICD-10-CM

## 2022-12-19 DIAGNOSIS — M25.661 KNEE STIFFNESS, RIGHT: ICD-10-CM

## 2022-12-19 DIAGNOSIS — M25.561 RIGHT KNEE PAIN, UNSPECIFIED CHRONICITY: ICD-10-CM

## 2022-12-19 DIAGNOSIS — S80.01XD CONTUSION OF RIGHT KNEE, SUBSEQUENT ENCOUNTER: Primary | ICD-10-CM

## 2022-12-19 PROCEDURE — 97530 THERAPEUTIC ACTIVITIES: CPT | Performed by: PHYSICAL THERAPIST

## 2022-12-19 PROCEDURE — 97110 THERAPEUTIC EXERCISES: CPT | Performed by: PHYSICAL THERAPIST

## 2022-12-19 PROCEDURE — 97140 MANUAL THERAPY 1/> REGIONS: CPT | Performed by: PHYSICAL THERAPIST

## 2022-12-19 NOTE — PROGRESS NOTES
Physical Therapy Daily Treatment Note      Patient: Maureen Courtney   : 1970  Referring practitioner: No ref. provider found  Date of Initial Visit: Type: THERAPY  Noted: 11/15/2022  Today's Date: 2022  Patient seen for 11 sessions           Subjective Questionnaire:       Subjective Evaluation    History of Present Illness    Subjective comment: Pt reports her knee has been hurting since yesterday and day not doing anything and her pain is 6/10.       Objective          Active Range of Motion     Right Knee   Flexion: 118 degrees   Extension: 0 degrees     Passive Range of Motion     Right Knee   Flexion: 133 degrees       See Exercise, Manual, and Modality Logs for complete treatment.       Assessment & Plan     Assessment    Assessment details: Pt ambulates with antalgic gait with minimal R knee flexion or extension and decreased stance phase on R.  Pt tolerates strengthening exercise well stating Total Gym is the most difficult.  Pt reports that at times by the end of the day her knee swells and she even has to loosen or remove knee brace.  Continue with POC.        Visit Diagnoses:    ICD-10-CM ICD-9-CM   1. Contusion of right knee, subsequent encounter  S80.01XD V58.89     924.11   2. Right knee pain, unspecified chronicity  M25.561 719.46   3. Knee stiffness, right  M25.661 719.56   4. Difficulty walking  R26.2 719.7       Progress per Plan of Care and Progress strengthening /stabilization /functional activity           Timed:  Manual Therapy:    8     mins  83784;  Therapeutic Exercise:    15     mins  98877;     Neuromuscular Una:        mins  56053;    Therapeutic Activity:     8     mins  71481;     Gait Training:           mins  11406;     Ultrasound:          mins  99270;    Electrical Stimulation:         mins  10851 ( );  Aquatic Therapy          mins  29584    Untimed:  Electrical Stimulation:         mins  65046 ( );  Mechanical Traction:         mins  63402;      Timed Treatment:   31   mins   Total Treatment:     31   mins    Electronically signed    Melody Fuentes PTA  Physical Therapist Assistant    [place] license: [id number]

## 2022-12-29 ENCOUNTER — TREATMENT (OUTPATIENT)
Dept: PHYSICAL THERAPY | Facility: CLINIC | Age: 52
End: 2022-12-29

## 2022-12-29 DIAGNOSIS — S80.01XD CONTUSION OF RIGHT KNEE, SUBSEQUENT ENCOUNTER: Primary | ICD-10-CM

## 2022-12-29 DIAGNOSIS — R26.2 DIFFICULTY WALKING: ICD-10-CM

## 2022-12-29 DIAGNOSIS — M25.561 RIGHT KNEE PAIN, UNSPECIFIED CHRONICITY: ICD-10-CM

## 2022-12-29 DIAGNOSIS — M25.661 KNEE STIFFNESS, RIGHT: ICD-10-CM

## 2022-12-29 PROCEDURE — 97110 THERAPEUTIC EXERCISES: CPT | Performed by: PHYSICAL THERAPIST

## 2022-12-29 PROCEDURE — 97140 MANUAL THERAPY 1/> REGIONS: CPT | Performed by: PHYSICAL THERAPIST

## 2022-12-29 NOTE — PROGRESS NOTES
Physical Therapy Daily Treatment Note      Patient: Maureen Courtney   : 1970  Referring practitioner: JAKE Lorenz  Date of Initial Visit: Type: THERAPY  Noted: 11/15/2022  Today's Date: 2022  Patient seen for 12 sessions           Subjective Questionnaire:       Subjective Evaluation    History of Present Illness    Subjective comment: Pt reports 5/10 L knee pain.  Pt reports the only relief she gets is the first 15 minutes after getting up then L knee starts hurting.  Pt reports L knee is still swelling a lot.  Pt reported she cannot get down on the floor and so she was standing wrapping presents and after 20 minutes she had to sit and rest.  Pain  Current pain ratin           Objective          Active Range of Motion     Right Knee   Flexion: 111 degrees   Extension: 0 degrees     Passive Range of Motion     Right Knee   Flexion: 130 degrees       See Exercise, Manual, and Modality Logs for complete treatment.       Assessment & Plan     Assessment    Assessment details: Pt reported fatigue and pain with step ups and reported she has difficulty with up and down to include stand to sit transfer.  Pt reports L medial quad that goes up to 2/3 of medial quad. pain after session 7/10 at medial knee pain. Applied ice after tx.  Pt reports pain with touch at medial and superior border of patella with MOBs.  Pt will have PRP injection January 3, 2023.  Pt ambulated out of clinic with antalgic gait.        Visit Diagnoses:    ICD-10-CM ICD-9-CM   1. Contusion of right knee, subsequent encounter  S80.01XD V58.89     924.11   2. Right knee pain, unspecified chronicity  M25.561 719.46   3. Knee stiffness, right  M25.661 719.56   4. Difficulty walking  R26.2 719.7       Progress per Plan of Care and Progress strengthening /stabilization /functional activity           Timed:  Manual Therapy:    8     mins  14441;  Therapeutic Exercise:    33     mins  69230;     Neuromuscular Una:        mins  56479;     Therapeutic Activity:          mins  50009;     Gait Training:           mins  95847;     Ultrasound:          mins  29906;    Electrical Stimulation:         mins  81509 ( );  Aquatic Therapy          mins  13331    Untimed:  Electrical Stimulation:         mins  36559 ( );  Mechanical Traction:         mins  06379;     Timed Treatment:   42   mins   Total Treatment:     52   mins    Electronically signed    Melody Fuentes PTA  Physical Therapist Assistant    [facility] license: [id number]

## 2023-01-03 ENCOUNTER — OFFICE VISIT (OUTPATIENT)
Dept: ORTHOPEDIC SURGERY | Facility: CLINIC | Age: 53
End: 2023-01-03
Payer: OTHER MISCELLANEOUS

## 2023-01-03 VITALS — BODY MASS INDEX: 34.73 KG/M2 | WEIGHT: 196 LBS | OXYGEN SATURATION: 98 % | HEIGHT: 63 IN | HEART RATE: 84 BPM

## 2023-01-03 DIAGNOSIS — S86.911D STRAIN OF RIGHT KNEE, SUBSEQUENT ENCOUNTER: ICD-10-CM

## 2023-01-03 DIAGNOSIS — M17.11 PRIMARY OSTEOARTHRITIS OF RIGHT KNEE: Primary | ICD-10-CM

## 2023-01-03 PROCEDURE — 0232T NJX PLATELET PLASMA: CPT | Performed by: ORTHOPAEDIC SURGERY

## 2023-01-03 NOTE — PROGRESS NOTES
Chief Complaint  Follow-up of the Right Knee     Subjective      Maureen Courtney presents to [facility] for follow up evaluation of the right knee. The patient has been treating her right knee osteoarthritis, right knee strain/injury conservatively. The patient is here today for a PRP injection in the right knee. The patient fell at work when walking when she tripped over something injuring her right knee.This is a work comp cast.     Allergies   Allergen Reactions   • Beef Allergy Unknown - Low Severity and Anaphylaxis   • Eggs Or Egg-Derived Products Shortness Of Breath   • Influenza Virus Vaccine Anaphylaxis   • Pork Allergy Anaphylaxis   • Tetanus Toxoid Anaphylaxis   • Cephalexin Hives   • Coffee Itching   • Dust Mite Extract Unknown - Low Severity   • Egg Phospholipids Itching   • Erythromycin Hives   • Mixed Grasses Unknown - Low Severity   • Molds & Smuts Unknown - Low Severity   • Msg [Monosodium Glutamate] Unknown - Low Severity   • Pineapple Itching   • Prednisone Itching     CANNOT GET PREDNISONE SHOT- CAN TAKE PILL FORM        Social History     Socioeconomic History   • Marital status:    Tobacco Use   • Smoking status: Every Day     Packs/day: 0.50     Years: 40.00     Pack years: 20.00     Types: Cigarettes     Start date: 1982   • Smokeless tobacco: Never   Vaping Use   • Vaping Use: Every day   • Substances: Flavoring   • Devices: Pre-filled or refillable cartridge   Substance and Sexual Activity   • Alcohol use: Not Currently     Comment: Occasionally   • Drug use: Never   • Sexual activity: Defer        Review of Systems     Objective   Vital Signs:   Pulse 84   Ht 160 cm (63\")   Wt 88.9 kg (196 lb)   SpO2 98%   BMI 34.72 kg/m²       Physical Exam  Constitutional:       Appearance: Normal appearance. The patient is well-developed and normal weight.   HENT:      Head: Normocephalic.      Right Ear: Hearing and external ear normal.      Left Ear: Hearing and  external ear normal.      Nose: Nose normal.   Eyes:      Conjunctiva/sclera: Conjunctivae normal.   Cardiovascular:      Rate and Rhythm: Normal rate.   Pulmonary:      Effort: Pulmonary effort is normal.      Breath sounds: No wheezing or rales.   Abdominal:      Palpations: Abdomen is soft.      Tenderness: There is no abdominal tenderness.   Musculoskeletal:      Cervical back: Normal range of motion.   Skin:     Findings: No rash.   Neurological:      Mental Status: The patient is alert and oriented to person, place, and time.   Psychiatric:         Mood and Affect: Mood and affect normal.         Judgment: Judgment normal.       Ortho Exam      Right knee- Tender to the medial joint line and distal femur. ROM -5 to 115 degrees. Stable to varus/valgus stress. Stable to anterior/posterior drawer. Negative Lachman's. Positive EHL, FHL, GS and TA. Sensation intact to all 5 nerves of the foot. Positive pulses. Negative Giovanny's.     Right knee PRP : R knee  Date/Time: 1/3/2023 2:17 PM  Consent given by: patient  Site marked: site marked  Timeout: Immediately prior to procedure a time out was called to verify the correct patient, procedure, equipment, support staff and site/side marked as required   Supporting Documentation  Indications: pain   Procedure Details  Location: knee - R knee  Patient tolerance: patient tolerated the procedure well with no immediate complications            Imaging Results (Most Recent)     None           Result Review :       No results found.           Assessment and Plan     Diagnoses and all orders for this visit:    1. Primary osteoarthritis of right knee (Primary)    2. Strain of right knee, subsequent encounter        Discussed the treatment plan with the patient.  Discussed the risks and benefits of a right knee PRP injection. The patient expressed understanding and wished to proceed. She tolerated the injection well.     Educated on risk of smoking. Discussed options for  smoking cessation. and Call or return if worsening symptoms.    Follow Up     6 weeks      Patient was given instructions and counseling regarding her condition or for health maintenance advice. Please see specific information pulled into the AVS if appropriate.     Scribed for Yang Velez MD by Cherelle Heard.  01/03/23   13:45 EST    I have personally performed the services described in this document as scribed by the above individual and it is both accurate and complete. Yang Velez MD 01/04/23

## 2023-01-04 ENCOUNTER — TREATMENT (OUTPATIENT)
Dept: PHYSICAL THERAPY | Facility: CLINIC | Age: 53
End: 2023-01-04
Payer: OTHER MISCELLANEOUS

## 2023-01-04 DIAGNOSIS — S80.01XD CONTUSION OF RIGHT KNEE, SUBSEQUENT ENCOUNTER: Primary | ICD-10-CM

## 2023-01-04 DIAGNOSIS — M25.561 RIGHT KNEE PAIN, UNSPECIFIED CHRONICITY: ICD-10-CM

## 2023-01-04 DIAGNOSIS — M25.661 KNEE STIFFNESS, RIGHT: ICD-10-CM

## 2023-01-04 DIAGNOSIS — R26.2 DIFFICULTY WALKING: ICD-10-CM

## 2023-01-04 PROCEDURE — 97110 THERAPEUTIC EXERCISES: CPT | Performed by: PHYSICAL THERAPIST

## 2023-01-04 PROCEDURE — 97140 MANUAL THERAPY 1/> REGIONS: CPT | Performed by: PHYSICAL THERAPIST

## 2023-01-04 PROCEDURE — 97530 THERAPEUTIC ACTIVITIES: CPT | Performed by: PHYSICAL THERAPIST

## 2023-01-04 NOTE — PROGRESS NOTES
Outpatient Physical Therapy  [address] [facility], [place] [address]                 Physical Therapy Daily Treatment Note    Patient: Maureen Courtney   : 1970  Diagnosis/ICD-10 Code:  Contusion of right knee, subsequent encounter [S80.01XD]  Referring practitioner: JAKE Lorenz  Date of Initial Visit: Type: THERAPY  Noted: 11/15/2022  Today's Date: 2023  Patient seen for 13 sessions             Subjective   Maureen Courtney reports: she had an injection yesterday and her right knee has been feeling odd since then. Patient reports her lower leg and ankle feel tight as if it was swollen but it isn't and it also feels like there is a wrap around her R knee causing pressure and limiting her ROM. Patient also reports that stairs have been more painful since the injection.    Pain: 6/10 pain in right knee at time of arrival. Pain elevated to 7/10 pain with step ups.     Objective     See Exercise, Manual, and Modality Logs for complete treatment.     Assessment/Plan  Patient has increased sensitivity to touch and with flexion of the right knee. Patient presents to clinic with soft compression brace and preformed exercises without brace on. Patient does have some inflammation of her lateral lower leg today.      Progress per Plan of Care       Timed:  Manual Therapy:    8     mins  25125;  Therapeutic Exercise:    23     mins  51884;     Neuromuscular Una:    0    mins  98148;    Therapeutic Activity:     14     mins  06979;     Gait Trainin     mins  06368;    Aquatic Therapy:     0     mins  86324;       Untimed:  Electrical Stimulation:    0     mins  30105 ( );  Mechanical Traction:    0     mins  57180;       Timed Treatment:   45   mins   Total Treatment:     45   mins      Electronically signed:   Sabrina Diaz PTA  Physical Therapist Assistant  [facility] License #: [id number]

## 2023-01-04 NOTE — PROGRESS NOTES
Progress Note  [facility] [address] [place] [address]      Patient: Maureen Courtney   : 1970  Diagnosis/ICD-10 Code:  Contusion of right knee, subsequent encounter [S80.01XD]  Referring practitioner: JAKE Lorenz  Date of Initial Visit: Type: THERAPY  Noted: 11/15/2022  Today's Date: 2023  Patient seen for 13 sessions      Subjective:   Subjective Questionnaire: LEFS:  = 32.5% Function  Clinical Progress: improved  Home Program Compliance: Yes  Treatment has included: therapeutic exercise, neuromuscular re-education, manual therapy, therapeutic activity and gait training    Subjective   The patient reported that her right knee pain today is a 6/10. Over the past month, she has noticed improvements in her ability to walk. She is now walking without an AD. She still has difficulty going up/down stairs. She notices that her knee swells if she stands/walks too long without her knee brace. She received an injection yesterday at her ortho visit and is still waiting to see how much relief that will provide. She is back to sitting duty at work, but not her normal position. She would like to continue with PT.    Objective          Tenderness     Additional Tenderness Details  Tenderness in right knee over VMO, medial patellar border, and hip adductor muscle group.    Neurological Testing     Additional Neurological Details  Sensation to light touch intact and equal bilaterally from L2-S1 except for mild hyposensation in right over medial knee joint, medial shin and first phalanx    Active Range of Motion     Right Knee   Flexion: 120 degrees   Extension: 0 degrees     Passive Range of Motion     Right Knee   Flexion: 135 degrees   Extension: 0 degrees     Patellar Mobility   Left Knee Patellar tendons within functional limits include the medial, lateral, superior and inferior.     Right Knee Patellar tendons within functional limits include the medial, lateral, superior and inferior.      Strength/Myotome Testing     Left Hip   Planes of Motion   Flexion: 4+    Right Hip   Planes of Motion   Flexion: 4    Left Knee   Quadriceps contraction: good    Right Knee   Flexion: 4+  Extension: 4  Quadriceps contraction: good    Ambulation     Ambulation: Level Surfaces     Additional Level Surfaces Ambulation Details  The patient ambulates without an AD with slight lateral weight shift to the left.       See Exercise, Manual, and Modality Logs for complete treatment.     Assessment & Plan     Assessment    Assessment details: The patient was re-evaluated today and presents with improvements in right knee ROM, right knee strength, and function (LEFS) compare to her previous assessment. Her objective measures continue to improve, but her pain rating is mostly unchanged. She would benefit from continued PT at this time to address remaining functional deficits and to allow the patient to return to work.     Goals  Plan Goals: KNEE PROBLEMS:     1. The patient has limited ROM of the right knee.              LTG 1: 12 weeks:  The patient will demonstrate 0 to 140 degrees of ROM for the right knee in order to allow patient to walk, ascend/descend stairs, and return to sports without limitation.                          STATUS:  Progressing              STG 1a: 6 weeks:  The patient will demonstrate 0 to 120 degrees of ROM for the right knee.                          STATUS:  Met    2. The patient has limited strength of the right knee.              LTG 2: 12 weeks: The patient will demonstrate 5 /5 strength for right knee flexion and extension in order to allow patient improved joint stability                          STATUS:   Progressing              STG 2a: 6 weeks: The patient will demonstrate 4+ /5 strength for right knee flexion and extension                          STATUS:   Progressing                3. The patient has gait dysfunction.              LTG 3: 12 weeks:  The patient will ambulate without  assistive device, independently, for community distances with normal biomechanics of the right lower extremity in order to improve mobility and allow patient to perform activities such as grocery shopping with greater ease.                          STATUS:   Progressing    4. The patient has limited strength of the left hip.              LTG 4: 12 weeks: The patient will demonstrate 5 /5 strength for right hip flexion, abduction, and extension in order to allow patient improved joint stability                          STATUS:   Progressing              STG 4a: 6 weeks: The patient will demonstrate 4+ /5 strength for right hip flexion, abduction, and extension                          STATUS:   Progressing    5. Mobility: Walking/Moving Around Functional Limitation                               LTG 5: 12 weeks:  The patient will demonstrate 25 % limitation by achieving a score of 60/80 on the LEFS.                          STATUS:   Progressing              STG 5 a: 6 weeks:  The patient will demonstrate 37.5 % limitation by achieving a score of 50/80 on the LEFS.                             STATUS:  Progressing      Progress toward previous goals: Partially Met      Recommendations: Continue as planned  Timeframe: 1 month  Prognosis to achieve goals: good    PT Signature: Sabrina Diaz PTA    Electronically signed 2023    [place] License: PT - [id number]       Timed:  Manual Therapy:    0     mins  77035;  Therapeutic Exercise:    0     mins  12083;     Neuromuscular Una:    0    mins  22596;    Therapeutic Activity:     0     mins  78830;     Gait Trainin     mins  45323;     Aquatics                         0      mins  23099    Un-timed:  Mechanical Traction      0     mins  78302  Dry Needling     0     mins self-pay  Electrical Stimulation:    0     mins  79886 ( );    Timed Treatment:   0   mins   Total Treatment:     8   mins

## 2023-01-06 ENCOUNTER — TREATMENT (OUTPATIENT)
Dept: PHYSICAL THERAPY | Facility: CLINIC | Age: 53
End: 2023-01-06
Payer: OTHER MISCELLANEOUS

## 2023-01-06 DIAGNOSIS — M25.561 RIGHT KNEE PAIN, UNSPECIFIED CHRONICITY: ICD-10-CM

## 2023-01-06 DIAGNOSIS — S80.01XD CONTUSION OF RIGHT KNEE, SUBSEQUENT ENCOUNTER: Primary | ICD-10-CM

## 2023-01-06 DIAGNOSIS — M25.661 KNEE STIFFNESS, RIGHT: ICD-10-CM

## 2023-01-06 DIAGNOSIS — R26.2 DIFFICULTY WALKING: ICD-10-CM

## 2023-01-06 PROCEDURE — 97530 THERAPEUTIC ACTIVITIES: CPT | Performed by: PHYSICAL THERAPIST

## 2023-01-06 PROCEDURE — 97110 THERAPEUTIC EXERCISES: CPT | Performed by: PHYSICAL THERAPIST

## 2023-01-06 NOTE — PROGRESS NOTES
Physical Therapy Daily Treatment Note      Patient: Maureen Courtney   : 1970  Referring practitioner: JAKE Lorenz  Date of Initial Visit: Type: THERAPY  Noted: 11/15/2022  Today's Date: 2023  Patient seen for 14 sessions           Subjective Questionnaire:       Subjective Evaluation    History of Present Illness    Subjective comment: Pt reports anterior lower leg down to her knees feel funny, like it is tightly wrapped, like it is swollen but it's not.  Pt reported 6/10 pain.Pain  Current pain ratin           Objective   See Exercise, Manual, and Modality Logs for complete treatment.       Assessment & Plan     Assessment    Assessment details: Pt reports continued pressure at medial quads that increases with pressure to leg.  Pt reports pain 6/10 doesn't go up to the inside of her thigh and now it goes up half way up the leg.  Continue POC to decrease RLE pain and return her to full work duty and tolerance to daily activity.  Pt reported pain did not get any worse with session and felt better moving around.        Visit Diagnoses:    ICD-10-CM ICD-9-CM   1. Contusion of right knee, subsequent encounter  S80.01XD V58.89     924.11   2. Right knee pain, unspecified chronicity  M25.561 719.46   3. Knee stiffness, right  M25.661 719.56   4. Difficulty walking  R26.2 719.7       Progress per Plan of Care and Progress strengthening /stabilization /functional activity           Timed:  Manual Therapy:         mins  19588;  Therapeutic Exercise:    23     mins  23594;     Neuromuscular Una:        mins  73414;    Therapeutic Activity:     8     mins  68229;     Gait Training:           mins  17192;     Ultrasound:          mins  58540;    Electrical Stimulation:         mins  52360 ( );  Aquatic Therapy          mins  00259    Untimed:  Electrical Stimulation:         mins  43925 ( );  Mechanical Traction:         mins  89007;     Timed Treatment:   31   mins   Total Treatment:     31    mins    Electronically signed    Melody Fuentes PTA  Physical Therapist Assistant    [facility] license: [id number]

## 2023-01-09 ENCOUNTER — TREATMENT (OUTPATIENT)
Dept: PHYSICAL THERAPY | Facility: CLINIC | Age: 53
End: 2023-01-09
Payer: COMMERCIAL

## 2023-01-09 DIAGNOSIS — R26.2 DIFFICULTY WALKING: ICD-10-CM

## 2023-01-09 DIAGNOSIS — M25.661 KNEE STIFFNESS, RIGHT: ICD-10-CM

## 2023-01-09 DIAGNOSIS — M25.561 RIGHT KNEE PAIN, UNSPECIFIED CHRONICITY: ICD-10-CM

## 2023-01-09 DIAGNOSIS — S80.01XD CONTUSION OF RIGHT KNEE, SUBSEQUENT ENCOUNTER: Primary | ICD-10-CM

## 2023-01-09 PROCEDURE — 97110 THERAPEUTIC EXERCISES: CPT | Performed by: PHYSICAL THERAPIST

## 2023-01-09 PROCEDURE — 97530 THERAPEUTIC ACTIVITIES: CPT | Performed by: PHYSICAL THERAPIST

## 2023-01-09 PROCEDURE — 97140 MANUAL THERAPY 1/> REGIONS: CPT | Performed by: PHYSICAL THERAPIST

## 2023-01-09 NOTE — PROGRESS NOTES
Physical Therapy Daily Treatment Note      Patient: Maureen Courtney   : 1970  Referring practitioner: JAKE Lorenz  Date of Initial Visit: No linked episodes  Today's Date: 2023  Patient seen for Visit count could not be calculated. Make sure you are using a visit which is associated with an episode. sessions         Maureen   Subjective Questionnaire:       Subjective Evaluation    History of Present Illness    Subjective comment: Pt reports pain 5/10 constant with R knee flexion and increases with dressing and external rotation.Pain  Current pain ratin           Objective   See Exercise, Manual, and Modality Logs for complete treatment.       Assessment & Plan     Assessment    Assessment details: Pt reports continued 5/10 medial knee pain especially with pressure.  Pt tolerated session well and will benefit from continued PT.  Pt will see MD on 23.        Visit Diagnoses:  No diagnosis found.    Progress per Plan of Care and Progress strengthening /stabilization /functional activity             Timed:  Manual Therapy:    8     mins  11263;  Therapeutic Exercise:    14    mins  26562;     Neuromuscular Una:        mins  88863;    Therapeutic Activity:      8    mins  43298;     Gait Training:           mins  57164;     Ultrasound:          mins  55278;    Electrical Stimulation:         mins  82325 ( );  Aquatic Therapy          mins  78459    Untimed:  Electrical Stimulation:         mins  67526 ( );  Mechanical Traction:         mins  89919;     Timed Treatment: 30     mins   Total Treatment:    30    mins    Electronically signed    Melody Fuentes PTA  Physical Therapist Assistant    [facility] license: [id number]

## 2023-01-10 DIAGNOSIS — G89.29 CHRONIC BILATERAL LOW BACK PAIN WITHOUT SCIATICA: ICD-10-CM

## 2023-01-10 DIAGNOSIS — I10 PRIMARY HYPERTENSION: ICD-10-CM

## 2023-01-10 DIAGNOSIS — M54.50 CHRONIC BILATERAL LOW BACK PAIN WITHOUT SCIATICA: ICD-10-CM

## 2023-01-10 RX ORDER — LOSARTAN POTASSIUM 25 MG/1
TABLET ORAL
Qty: 30 TABLET | Refills: 0 | Status: SHIPPED | OUTPATIENT
Start: 2023-01-10 | End: 2023-02-02 | Stop reason: SDUPTHER

## 2023-01-10 RX ORDER — CYCLOBENZAPRINE HCL 10 MG
TABLET ORAL
Qty: 30 TABLET | Refills: 0 | Status: SHIPPED | OUTPATIENT
Start: 2023-01-10 | End: 2023-02-02 | Stop reason: SDUPTHER

## 2023-01-11 ENCOUNTER — TREATMENT (OUTPATIENT)
Dept: PHYSICAL THERAPY | Facility: CLINIC | Age: 53
End: 2023-01-11
Payer: OTHER MISCELLANEOUS

## 2023-01-11 DIAGNOSIS — S80.01XD CONTUSION OF RIGHT KNEE, SUBSEQUENT ENCOUNTER: Primary | ICD-10-CM

## 2023-01-11 DIAGNOSIS — M25.661 KNEE STIFFNESS, RIGHT: ICD-10-CM

## 2023-01-11 DIAGNOSIS — M25.561 RIGHT KNEE PAIN, UNSPECIFIED CHRONICITY: ICD-10-CM

## 2023-01-11 DIAGNOSIS — R26.2 DIFFICULTY WALKING: ICD-10-CM

## 2023-01-11 PROCEDURE — 97110 THERAPEUTIC EXERCISES: CPT | Performed by: PHYSICAL THERAPIST

## 2023-01-11 PROCEDURE — 97530 THERAPEUTIC ACTIVITIES: CPT | Performed by: PHYSICAL THERAPIST

## 2023-01-11 NOTE — PROGRESS NOTES
Outpatient Physical Therapy  [address] [facility], [place] [address]                 Physical Therapy Daily Treatment Note    Patient: Maureen Courtney   : 1970  Diagnosis/ICD-10 Code:  Contusion of right knee, subsequent encounter [S80.01XD]  Referring practitioner: JAKE Lorenz  Date of Initial Visit: Type: THERAPY  Noted: 11/15/2022  Today's Date: 2023  Patient seen for 16 sessions             Subjective   Maureen Courtney reports: she hasn't been feeling good due to allergy testing she had done yesterday. Patient states the numbness in her right toes still remains but her pain is 4/10 in her right knee at time of arrival. Patient attributed decreased pain to being off work for allergy testing and not up on her feet for long periods of time.     Objective   No complaints of increased pain or discomfort.     See Exercise, Manual, and Modality Logs for complete treatment.     Assessment/Plan  Maureen progressing as evident by decreased overall knee pain. Pt tolerated exercises well, no complaints of increased pain or discomfort. Pt would benefit from skilled PT to address Range of Motion  and Strength deficits, pain management and any concerns with ADLs.     Progress per Plan of Care       Timed:  Manual Therapy:    4     mins  18927;  Therapeutic Exercise:    16     mins  44728;     Neuromuscular Una:    0    mins  63044;    Therapeutic Activity:     10     mins  79988;     Gait Trainin     mins  80568;    Aquatic Therapy:     0     mins  19484;       Untimed:  Electrical Stimulation:    0     mins  06329 ( );  Mechanical Traction:    0     mins  25508;       Timed Treatment:   30   mins   Total Treatment:     30   mins      Electronically signed:   Sabrina Diaz PTA  Physical Therapist Assistant  [facility] License #: [id number]

## 2023-01-16 ENCOUNTER — TREATMENT (OUTPATIENT)
Dept: PHYSICAL THERAPY | Facility: CLINIC | Age: 53
End: 2023-01-16
Payer: OTHER MISCELLANEOUS

## 2023-01-16 DIAGNOSIS — M25.561 RIGHT KNEE PAIN, UNSPECIFIED CHRONICITY: ICD-10-CM

## 2023-01-16 DIAGNOSIS — S80.01XD CONTUSION OF RIGHT KNEE, SUBSEQUENT ENCOUNTER: Primary | ICD-10-CM

## 2023-01-16 DIAGNOSIS — R26.2 DIFFICULTY WALKING: ICD-10-CM

## 2023-01-16 DIAGNOSIS — M25.661 KNEE STIFFNESS, RIGHT: ICD-10-CM

## 2023-01-16 PROCEDURE — 97530 THERAPEUTIC ACTIVITIES: CPT | Performed by: PHYSICAL THERAPIST

## 2023-01-16 PROCEDURE — 97110 THERAPEUTIC EXERCISES: CPT | Performed by: PHYSICAL THERAPIST

## 2023-01-16 NOTE — PROGRESS NOTES
Physical Therapy Daily Treatment Note      Patient: Maureen Courtney   : 1970  Referring practitioner: JAKE Lorenz  Date of Initial Visit: Type: THERAPY  Noted: 11/15/2022  Today's Date: 2023  Patient seen for 17 sessions           Subjective Questionnaire:       Subjective Evaluation    History of Present Illness    Subjective comment: Pt reported 5/10 R medial knee pain reporting it feels rough.         Objective   See Exercise, Manual, and Modality Logs for complete treatment.       Assessment & Plan     Assessment    Assessment details: Pt reports continued  Medial R knee pain.  Pt reports she is unable  To tolerated more than 20 minutes standing doing daily home chores.  Pt reports she attempted standing longer at work but after 10 minutes she has numbness down the front of her leg at the knee and goes down into her toes.  It has been 12 says since PRP tx.        Visit Diagnoses:    ICD-10-CM ICD-9-CM   1. Contusion of right knee, subsequent encounter  S80.01XD V58.89     924.11   2. Right knee pain, unspecified chronicity  M25.561 719.46   3. Knee stiffness, right  M25.661 719.56   4. Difficulty walking  R26.2 719.7       Progress per Plan of Care and Progress strengthening /stabilization /functional activity           Timed:  Manual Therapy:         mins  31258;  Therapeutic Exercise:    15     mins  41879;     Neuromuscular Una:        mins  12255;    Therapeutic Activity:     8     mins  64194;     Gait Training:           mins  49809;     Ultrasound:          mins  37523;    Electrical Stimulation:         mins  67456 ( );  Aquatic Therapy          mins  86718    Untimed:  Electrical Stimulation:         mins  69330 ( );  Mechanical Traction:         mins  74091;     Timed Treatment:   23   mins   Total Treatment:     23   mins    Electronically signed    Melody Fuentes PTA  Physical Therapist Assistant    [facility] license: [id number]

## 2023-01-18 ENCOUNTER — TREATMENT (OUTPATIENT)
Dept: PHYSICAL THERAPY | Facility: CLINIC | Age: 53
End: 2023-01-18
Payer: OTHER MISCELLANEOUS

## 2023-01-18 DIAGNOSIS — M25.661 KNEE STIFFNESS, RIGHT: ICD-10-CM

## 2023-01-18 DIAGNOSIS — R26.2 DIFFICULTY WALKING: ICD-10-CM

## 2023-01-18 DIAGNOSIS — M25.561 RIGHT KNEE PAIN, UNSPECIFIED CHRONICITY: ICD-10-CM

## 2023-01-18 DIAGNOSIS — S80.01XD CONTUSION OF RIGHT KNEE, SUBSEQUENT ENCOUNTER: Primary | ICD-10-CM

## 2023-01-18 PROCEDURE — 97110 THERAPEUTIC EXERCISES: CPT | Performed by: PHYSICAL THERAPIST

## 2023-01-18 PROCEDURE — 97140 MANUAL THERAPY 1/> REGIONS: CPT | Performed by: PHYSICAL THERAPIST

## 2023-01-18 PROCEDURE — 97530 THERAPEUTIC ACTIVITIES: CPT | Performed by: PHYSICAL THERAPIST

## 2023-01-18 NOTE — PROGRESS NOTES
Physical Therapy Daily Treatment Note      Patient: Maureen Courtney   : 1970  Referring practitioner: JAKE Lorenz  Date of Initial Visit: Type: THERAPY  Noted: 11/15/2022  Today's Date: 2023  Patient seen for 18 sessions           Subjective Questionnaire:       Subjective Evaluation    History of Present Illness    Subjective comment: Pt exhibits  R knee edema and reports 5/10 constant pain.  Pt reports her pain got to 5/10 and has stayed there.Pain  Current pain ratin           Objective          Active Range of Motion     Right Knee   Flexion: 117 degrees   Extension: 0 degrees     Additional Active Range of Motion Details  Pt reports pain starts at 117 degrees active flexion.    Passive Range of Motion     Right Knee   Flexion: 126 degrees       See Exercise, Manual, and Modality Logs for complete treatment.       Assessment & Plan     Assessment    Assessment details: Pt reports she is at a sitting job right now and has to get up and walk around to reduce the stiffness in her pain.  Pt is not fully wt bearing on R and ifeeling B hip pain with prolonged ambulation as in walking from work station to restroom or break room.  Today is two weeks from PRP when she got PRP injection.  Continue with POC to returnpt to prior level of function to include back to full work duty.          Visit Diagnoses:    ICD-10-CM ICD-9-CM   1. Contusion of right knee, subsequent encounter  S80.01XD V58.89     924.11   2. Right knee pain, unspecified chronicity  M25.561 719.46   3. Knee stiffness, right  M25.661 719.56   4. Difficulty walking  R26.2 719.7       Progress per Plan of Care and Progress strengthening /stabilization /functional activity           Timed:  Manual Therapy:    8     mins  08491;  Therapeutic Exercise:    38     mins  10449;     Neuromuscular Una:        mins  73166;    Therapeutic Activity:     8     mins  15535;     Gait Training:           mins  91764;     Ultrasound:          mins  48776;     Electrical Stimulation:         mins  50550 ( );  Aquatic Therapy          mins  69361    Untimed:  Electrical Stimulation:         mins  61964 ( );  Mechanical Traction:         mins  88441;     Timed Treatment:   54   mins   Total Treatment:     54   mins    Electronically signed    Melody Fuentes PTA  Physical Therapist Assistant    [employer] license: [id number]

## 2023-01-23 ENCOUNTER — TREATMENT (OUTPATIENT)
Dept: PHYSICAL THERAPY | Facility: CLINIC | Age: 53
End: 2023-01-23
Payer: OTHER MISCELLANEOUS

## 2023-01-23 DIAGNOSIS — S80.01XD CONTUSION OF RIGHT KNEE, SUBSEQUENT ENCOUNTER: Primary | ICD-10-CM

## 2023-01-23 DIAGNOSIS — M25.561 RIGHT KNEE PAIN, UNSPECIFIED CHRONICITY: ICD-10-CM

## 2023-01-23 DIAGNOSIS — M25.661 KNEE STIFFNESS, RIGHT: ICD-10-CM

## 2023-01-23 DIAGNOSIS — R26.2 DIFFICULTY WALKING: ICD-10-CM

## 2023-01-23 PROCEDURE — 97530 THERAPEUTIC ACTIVITIES: CPT | Performed by: PHYSICAL THERAPIST

## 2023-01-23 PROCEDURE — 97110 THERAPEUTIC EXERCISES: CPT | Performed by: PHYSICAL THERAPIST

## 2023-01-23 NOTE — PROGRESS NOTES
Physical Therapy Daily Treatment Note      Patient: Maureen Courtney   : 1970  Referring practitioner: JAKE Lorenz  Date of Initial Visit: Type: THERAPY  Noted: 11/15/2022  Today's Date: 2023  Patient seen for 19 sessions           Subjective Questionnaire:       Subjective Evaluation    History of Present Illness    Subjective comment: Pt reports she is feeling kind of rought explaining the knee pain at the distal patella        Objective   See Exercise, Manual, and Modality Logs for complete treatment.       Assessment & Plan     Assessment    Assessment details: Pt reports she was up moving and pushing at work and is in rough shape 6/10 pain.  Pt ambulated with doris pronounced limp today.  Pt reported having pain at the distal patellar edge and the medial distal quad.        Visit Diagnoses:    ICD-10-CM ICD-9-CM   1. Contusion of right knee, subsequent encounter  S80.01XD V58.89     924.11   2. Right knee pain, unspecified chronicity  M25.561 719.46   3. Knee stiffness, right  M25.661 719.56   4. Difficulty walking  R26.2 719.7       Progress per Plan of Care and Progress strengthening /stabilization /functional activity           Timed:  Manual Therapy:         mins  82106;  Therapeutic Exercise:    23     mins  74859;     Neuromuscular Una:        mins  79853;    Therapeutic Activity:     8     mins  30154;     Gait Training:           mins  28480;     Ultrasound:          mins  34651;    Electrical Stimulation:         mins  60974 ( );  Aquatic Therapy          mins  97273    Untimed:  Electrical Stimulation:         mins  65941 ( );  Mechanical Traction:         mins  44218;     Timed Treatment:   31   mins   Total Treatment:     31   mins    Electronically signed    Melody Fuentes PTA  Physical Therapist Assistant    [employer] license: [id number]

## 2023-01-25 ENCOUNTER — TREATMENT (OUTPATIENT)
Dept: PHYSICAL THERAPY | Facility: CLINIC | Age: 53
End: 2023-01-25
Payer: OTHER MISCELLANEOUS

## 2023-01-25 DIAGNOSIS — M25.661 KNEE STIFFNESS, RIGHT: ICD-10-CM

## 2023-01-25 DIAGNOSIS — M25.561 RIGHT KNEE PAIN, UNSPECIFIED CHRONICITY: ICD-10-CM

## 2023-01-25 DIAGNOSIS — R26.2 DIFFICULTY WALKING: ICD-10-CM

## 2023-01-25 DIAGNOSIS — S80.01XD CONTUSION OF RIGHT KNEE, SUBSEQUENT ENCOUNTER: Primary | ICD-10-CM

## 2023-01-25 PROCEDURE — 97110 THERAPEUTIC EXERCISES: CPT | Performed by: PHYSICAL THERAPIST

## 2023-01-25 PROCEDURE — 97530 THERAPEUTIC ACTIVITIES: CPT | Performed by: PHYSICAL THERAPIST

## 2023-01-25 NOTE — PROGRESS NOTES
Physical Therapy Daily Treatment Note      Patient: Maureen Courtney   : 1970  Referring practitioner: JAKE Lorenz  Date of Initial Visit: Type: THERAPY  Noted: 11/15/2022  Today's Date: 2023  Patient seen for 20 sessions           Subjective Questionnaire:       Subjective Evaluation    History of Present Illness    Subjective comment: Pt reports 5/10 R knee pain and it is not getting worse but it's not getting better.  Pt repots that in speaking with her workman's compe advisor the advisor suggested pt may have nerve damage.Pain  Current pain ratin           Objective   See Exercise, Manual, and Modality Logs for complete treatment.       Assessment & Plan     Assessment    Assessment details: Pt ambulates slowly and wearing a short knee brace.  Pt reported her R medial knee is painful and was numb most of the day yesterday.  Pt reports pain ascending stairs and still hurts at medial knee ascending stairs and feels a pinching at distal patella.  Pt tolerates strengthening well.  Today is three weeks from PRP injection.  Pt continues with 5-6/10 R medial knee pain.   Continue with POC to return pt to priof level of function to include back to original work and overtime hours.        Visit Diagnoses:    ICD-10-CM ICD-9-CM   1. Contusion of right knee, subsequent encounter  S80.01XD V58.89     924.11   2. Right knee pain, unspecified chronicity  M25.561 719.46   3. Knee stiffness, right  M25.661 719.56   4. Difficulty walking  R26.2 719.7       Progress per Plan of Care and Progress strengthening /stabilization /functional activity           Timed:  Manual Therapy:         mins  09502;  Therapeutic Exercise:    33     mins  99620;     Neuromuscular Una:        mins  17844;    Therapeutic Activity:     8     mins  70621;     Gait Training:           mins  43732;     Ultrasound:          mins  69137;    Electrical Stimulation:         mins  73560 ( );  Aquatic Therapy          mins   61430    Untimed:  Electrical Stimulation:         mins  65041 ( );  Mechanical Traction:         mins  62034;     Timed Treatment:   41   mins   Total Treatment:     56   mins    Electronically signed    Melody Fuentes PTA  Physical Therapist Assistant    [employer] license: [id number]

## 2023-01-30 ENCOUNTER — TREATMENT (OUTPATIENT)
Dept: PHYSICAL THERAPY | Facility: CLINIC | Age: 53
End: 2023-01-30
Payer: OTHER MISCELLANEOUS

## 2023-01-30 DIAGNOSIS — R26.2 DIFFICULTY WALKING: ICD-10-CM

## 2023-01-30 DIAGNOSIS — S80.01XD CONTUSION OF RIGHT KNEE, SUBSEQUENT ENCOUNTER: Primary | ICD-10-CM

## 2023-01-30 DIAGNOSIS — M25.661 KNEE STIFFNESS, RIGHT: ICD-10-CM

## 2023-01-30 DIAGNOSIS — M25.561 RIGHT KNEE PAIN, UNSPECIFIED CHRONICITY: ICD-10-CM

## 2023-01-30 PROCEDURE — 97110 THERAPEUTIC EXERCISES: CPT | Performed by: PHYSICAL THERAPIST

## 2023-01-30 PROCEDURE — 97530 THERAPEUTIC ACTIVITIES: CPT | Performed by: PHYSICAL THERAPIST

## 2023-01-30 NOTE — PROGRESS NOTES
Physical Therapy Daily Treatment Note      Patient: Maureen Courtney   : 1970  Referring practitioner: JAKE Lorenz  Date of Initial Visit: Type: THERAPY  Noted: 11/15/2022  Today's Date: 2023  Patient seen for 21 sessions           Subjective Questionnaire:       Subjective Evaluation    History of Present Illness    Subjective comment: Pt reports intermitten medial quad pain with stance phase.  Pt reports her caseworke has advised her to talk to orthopedic DR.  and report that standing causes her toes to go numb.  Pt reports she has pinching at distal patella upon standing with sit to stand transfer and with descending stairs. Pt reports she walked outdoors without her R knee brace and the medial knee felt like it was pulling.         Objective   See Exercise, Manual, and Modality Logs for complete treatment.       Assessment & Plan     Assessment    Assessment details: Pt ambulates with antalgic gait wearing a short R knee brace.  Pt continues to report medial knee pain and toes being numb with standing 15 minutes.  Pt tolerates strengthening exercises well.  Continue with POC.        Visit Diagnoses:    ICD-10-CM ICD-9-CM   1. Contusion of right knee, subsequent encounter  S80.01XD V58.89     924.11   2. Right knee pain, unspecified chronicity  M25.561 719.46   3. Knee stiffness, right  M25.661 719.56   4. Difficulty walking  R26.2 719.7       Progress per Plan of Care and Progress strengthening /stabilization /functional activity           Timed:  Manual Therapy:         mins  85480;  Therapeutic Exercise:    23     mins  05802;     Neuromuscular Una:        mins  71770;    Therapeutic Activity:     8     mins  65434;     Gait Training:           mins  49847;     Ultrasound:          mins  86740;    Electrical Stimulation:         mins  05766 ( );  Aquatic Therapy          mins  87647    Untimed:  Electrical Stimulation:         mins  91589 ( );  Mechanical Traction:         mins   11991;     Timed Treatment:   31   mins   Total Treatment:     31   mins    Electronically signed    Melody Fuentes PTA  Physical Therapist Assistant    [employer] license: [id number]

## 2023-02-01 ENCOUNTER — TREATMENT (OUTPATIENT)
Dept: PHYSICAL THERAPY | Facility: CLINIC | Age: 53
End: 2023-02-01
Payer: OTHER MISCELLANEOUS

## 2023-02-01 ENCOUNTER — TELEPHONE (OUTPATIENT)
Dept: ORTHOPEDIC SURGERY | Facility: CLINIC | Age: 53
End: 2023-02-01
Payer: COMMERCIAL

## 2023-02-01 DIAGNOSIS — S80.01XD CONTUSION OF RIGHT KNEE, SUBSEQUENT ENCOUNTER: Primary | ICD-10-CM

## 2023-02-01 DIAGNOSIS — R26.2 DIFFICULTY WALKING: ICD-10-CM

## 2023-02-01 DIAGNOSIS — M25.661 KNEE STIFFNESS, RIGHT: ICD-10-CM

## 2023-02-01 DIAGNOSIS — M25.561 RIGHT KNEE PAIN, UNSPECIFIED CHRONICITY: ICD-10-CM

## 2023-02-01 PROCEDURE — 97530 THERAPEUTIC ACTIVITIES: CPT | Performed by: PHYSICAL THERAPIST

## 2023-02-01 PROCEDURE — 97110 THERAPEUTIC EXERCISES: CPT | Performed by: PHYSICAL THERAPIST

## 2023-02-01 NOTE — TELEPHONE ENCOUNTER
"PATIENT HAD A PRP INJECTION OF RIGHT KNEE APPROX. 4 WEEKS AGO.  PATIENT STATES NO IMPROVEMENT.   FOR WORKERS COMP IS SUGGESTED A \"NERVE\" STUDY BECAUSE OF THE NUMBNESS.  PATIENT HAS AN APPOINTMENT 2-14-23.  PLEASE ADVISE.  CALL AFTER 2PM [phone]  "

## 2023-02-01 NOTE — PROGRESS NOTES
Physical Therapy Daily Treatment Note      Patient: Maureen Courtney   : 1970  Referring practitioner: JAKE Lorenz  Date of Initial Visit: Type: THERAPY  Noted: 11/15/2022  Today's Date: 2023  Patient seen for 22 sessions           Subjective Questionnaire:       Subjective Evaluation    History of Present Illness    Subjective comment: Pt reports her R knee is not any worse but it's not any better.  Pt reports current pain level is 6/10 after having to squat to air her tire.  Pt reports before kneeling her pain was 5/10.Pain  Current pain ratin           Objective   See Exercise, Manual, and Modality Logs for complete treatment.       Assessment & Plan     Assessment    Assessment details: Pt reported 8/10 pain at medial quad and felt pinching at distal patellar edge with flexion on [employer].    Pt's R knee flexion on [employer] 122 degrees.    Pt continues to ambulate with antalgic gait wearing short R knee brace.    Pt's R knee strength flexion 4+/5;  extension 5/5.    R hip strength: flexion 4+/5 with report of posterior knee pain ABD 3+/5: extension 3+ with report of R hip pain.  Pt scored 27/80 = 60-79% d on LEFS.        Visit Diagnoses:    ICD-10-CM ICD-9-CM   1. Contusion of right knee, subsequent encounter  S80.01XD V58.89     924.11   2. Right knee pain, unspecified chronicity  M25.561 719.46   3. Knee stiffness, right  M25.661 719.56   4. Difficulty walking  R26.2 719.7       Other - Today is pt's last scheduled visit and she will wait to ask for more until she sees Dr. Velez on 23.           Timed:  Manual Therapy:         mins  20257;  Therapeutic Exercise:    25     mins  10056;     Neuromuscular Una:        mins  83018;    Therapeutic Activity:     13     mins  47834;     Gait Training:           mins  22112;     Ultrasound:          mins  60948;    Electrical Stimulation:         mins  78944 ( );  Aquatic Therapy          mins  47507    Untimed:  Electrical  Stimulation:         mins  50506 ( );  Mechanical Traction:         mins  66210;     Timed Treatment:   38   mins   Total Treatment:     53    mins    Electronically signed    Melody Fuentes PTA  Physical Therapist Assistant    [employer] license: [id number]

## 2023-02-01 NOTE — TELEPHONE ENCOUNTER
Called and left message for patient stating that this would be discussed at her follow up visit with Ari next week and we can order the test then. If patient calls back okay to relay the message to the patient.

## 2023-02-02 ENCOUNTER — OFFICE VISIT (OUTPATIENT)
Dept: FAMILY MEDICINE CLINIC | Facility: CLINIC | Age: 53
End: 2023-02-02
Payer: COMMERCIAL

## 2023-02-02 VITALS
HEIGHT: 63 IN | RESPIRATION RATE: 18 BRPM | BODY MASS INDEX: 35.56 KG/M2 | DIASTOLIC BLOOD PRESSURE: 85 MMHG | WEIGHT: 200.7 LBS | SYSTOLIC BLOOD PRESSURE: 139 MMHG | HEART RATE: 75 BPM | OXYGEN SATURATION: 94 % | TEMPERATURE: 98 F

## 2023-02-02 DIAGNOSIS — I10 PRIMARY HYPERTENSION: Primary | ICD-10-CM

## 2023-02-02 DIAGNOSIS — G43.919 INTRACTABLE MIGRAINE WITHOUT STATUS MIGRAINOSUS, UNSPECIFIED MIGRAINE TYPE: ICD-10-CM

## 2023-02-02 DIAGNOSIS — M54.50 CHRONIC BILATERAL LOW BACK PAIN WITHOUT SCIATICA: ICD-10-CM

## 2023-02-02 DIAGNOSIS — G89.29 CHRONIC BILATERAL LOW BACK PAIN WITHOUT SCIATICA: ICD-10-CM

## 2023-02-02 DIAGNOSIS — F17.210 CIGARETTE NICOTINE DEPENDENCE WITHOUT COMPLICATION: ICD-10-CM

## 2023-02-02 DIAGNOSIS — Z12.31 SCREENING MAMMOGRAM FOR BREAST CANCER: ICD-10-CM

## 2023-02-02 DIAGNOSIS — J45.20 MILD INTERMITTENT ASTHMA WITHOUT COMPLICATION: ICD-10-CM

## 2023-02-02 DIAGNOSIS — R73.09 ELEVATED GLUCOSE: ICD-10-CM

## 2023-02-02 LAB
ALBUMIN SERPL-MCNC: 4.8 G/DL (ref 3.5–5.2)
ALBUMIN/GLOB SERPL: 1.7 G/DL
ALP SERPL-CCNC: 75 U/L (ref 39–117)
ALT SERPL W P-5'-P-CCNC: 18 U/L (ref 1–33)
ANION GAP SERPL CALCULATED.3IONS-SCNC: 11.5 MMOL/L (ref 5–15)
AST SERPL-CCNC: 16 U/L (ref 1–32)
BILIRUB SERPL-MCNC: 0.4 MG/DL (ref 0–1.2)
BUN SERPL-MCNC: 16 MG/DL (ref 6–20)
BUN/CREAT SERPL: 28.1 (ref 7–25)
CALCIUM SPEC-SCNC: 9.9 MG/DL (ref 8.6–10.5)
CHLORIDE SERPL-SCNC: 102 MMOL/L (ref 98–107)
CHOLEST SERPL-MCNC: 135 MG/DL (ref 0–200)
CO2 SERPL-SCNC: 28.5 MMOL/L (ref 22–29)
CREAT SERPL-MCNC: 0.57 MG/DL (ref 0.57–1)
DEPRECATED RDW RBC AUTO: 42.8 FL (ref 37–54)
EGFRCR SERPLBLD CKD-EPI 2021: 110.2 ML/MIN/1.73
ERYTHROCYTE [DISTWIDTH] IN BLOOD BY AUTOMATED COUNT: 13.5 % (ref 12.3–15.4)
GLOBULIN UR ELPH-MCNC: 2.9 GM/DL
GLUCOSE SERPL-MCNC: 103 MG/DL (ref 65–99)
HBA1C MFR BLD: 5.4 % (ref 4.8–5.6)
HCT VFR BLD AUTO: 49.1 % (ref 34–46.6)
HDLC SERPL QL: 2.45
HDLC SERPL-MCNC: 55 MG/DL (ref 40–60)
HGB BLD-MCNC: 16.6 G/DL (ref 12–15.9)
LDLC SERPL CALC-MCNC: 61 MG/DL (ref 0–100)
LYMPHOCYTES # BLD MANUAL: 1.71 10*3/MM3 (ref 0.7–3.1)
LYMPHOCYTES NFR BLD MANUAL: 10.1 % (ref 5–12)
MCH RBC QN AUTO: 29.6 PG (ref 26.6–33)
MCHC RBC AUTO-ENTMCNC: 33.8 G/DL (ref 31.5–35.7)
MCV RBC AUTO: 87.7 FL (ref 79–97)
MONOCYTES # BLD: 0.71 10*3/MM3 (ref 0.1–0.9)
NEUTROPHILS # BLD AUTO: 4.64 10*3/MM3 (ref 1.7–7)
NEUTROPHILS NFR BLD MANUAL: 65.7 % (ref 42.7–76)
PLAT MORPH BLD: NORMAL
PLATELET # BLD AUTO: 144 10*3/MM3 (ref 140–450)
PMV BLD AUTO: 14.5 FL (ref 6–12)
POTASSIUM SERPL-SCNC: 4.4 MMOL/L (ref 3.5–5.2)
PROT SERPL-MCNC: 7.7 G/DL (ref 6–8.5)
RBC # BLD AUTO: 5.6 10*6/MM3 (ref 3.77–5.28)
RBC MORPH BLD: NORMAL
SODIUM SERPL-SCNC: 142 MMOL/L (ref 136–145)
TRIGL SERPL-MCNC: 107 MG/DL (ref 0–150)
TSH SERPL DL<=0.05 MIU/L-ACNC: 1.46 UIU/ML (ref 0.27–4.2)
VARIANT LYMPHS NFR BLD MANUAL: 24.2 % (ref 19.6–45.3)
VLDLC SERPL-MCNC: 19 MG/DL (ref 5–40)
WBC MORPH BLD: NORMAL
WBC NRBC COR # BLD: 7.06 10*3/MM3 (ref 3.4–10.8)

## 2023-02-02 PROCEDURE — 80061 LIPID PANEL: CPT | Performed by: NURSE PRACTITIONER

## 2023-02-02 PROCEDURE — 99214 OFFICE O/P EST MOD 30 MIN: CPT | Performed by: NURSE PRACTITIONER

## 2023-02-02 PROCEDURE — 83036 HEMOGLOBIN GLYCOSYLATED A1C: CPT | Performed by: NURSE PRACTITIONER

## 2023-02-02 PROCEDURE — 80050 GENERAL HEALTH PANEL: CPT | Performed by: NURSE PRACTITIONER

## 2023-02-02 PROCEDURE — 85007 BL SMEAR W/DIFF WBC COUNT: CPT | Performed by: NURSE PRACTITIONER

## 2023-02-02 RX ORDER — LOSARTAN POTASSIUM 25 MG/1
25 TABLET ORAL DAILY
Qty: 90 TABLET | Refills: 1 | Status: SHIPPED | OUTPATIENT
Start: 2023-02-02

## 2023-02-02 RX ORDER — IBUPROFEN 800 MG/1
800 TABLET ORAL EVERY 8 HOURS PRN
Qty: 270 TABLET | Refills: 1 | Status: SHIPPED | OUTPATIENT
Start: 2023-02-02

## 2023-02-02 RX ORDER — MONTELUKAST SODIUM 10 MG/1
10 TABLET ORAL NIGHTLY
Qty: 90 TABLET | Refills: 1 | Status: SHIPPED | OUTPATIENT
Start: 2023-02-02

## 2023-02-02 RX ORDER — HYDROCHLOROTHIAZIDE 25 MG/1
25 TABLET ORAL DAILY
Qty: 90 TABLET | Refills: 1 | Status: SHIPPED | OUTPATIENT
Start: 2023-02-02

## 2023-02-02 RX ORDER — METOPROLOL SUCCINATE 100 MG/1
100 TABLET, EXTENDED RELEASE ORAL DAILY
Qty: 90 TABLET | Refills: 1 | Status: SHIPPED | OUTPATIENT
Start: 2023-02-02

## 2023-02-02 RX ORDER — SUMATRIPTAN 50 MG/1
TABLET, FILM COATED ORAL
Qty: 27 TABLET | Refills: 1 | Status: SHIPPED | OUTPATIENT
Start: 2023-02-02

## 2023-02-02 RX ORDER — CYCLOBENZAPRINE HCL 10 MG
10 TABLET ORAL NIGHTLY
Qty: 90 TABLET | Refills: 1 | Status: SHIPPED | OUTPATIENT
Start: 2023-02-02

## 2023-02-02 RX ORDER — RIMEGEPANT SULFATE 75 MG/75MG
75 TABLET, ORALLY DISINTEGRATING ORAL DAILY PRN
Qty: 30 TABLET | Refills: 1 | Status: SHIPPED | OUTPATIENT
Start: 2023-02-02

## 2023-02-02 NOTE — PROGRESS NOTES
Chief Complaint  Hypertension and Migraine    Subjective          Maureen Courtney presents to [facility]  History of Present Illness  H/A: She is doing ok with her current med.  She is not having to take more meds.  HTN:  She did take her meds this AM.  She did smoke more and is trying to go back to vaping.  She started at 10 years old smoking.  Back pain:  She is taking her flexeril for the back.    ASTHMA:  She is having to use the inhaler more since even getting covid.  She is breathing better overall.   Migraine: She is doing well with her Imitrex and Nurtec.  She feels good overall.  She is working 7 days a week 10-hour days and even longer at times.  Glucose:  She was on metformin but she said that her never had DM that she recall.  Her BS about 70-90's most of the time.  She is symptomatic at times.  She does check her sugars.  She checks twice a day.      Allergies  Beef allergy, Eggs or egg-derived products, Influenza virus vaccine, Pork allergy, Shellfish-derived products, Tetanus toxoid, Cephalexin, Coffee, Dust mite extract, Egg phospholipids, Erythromycin, Mixed grasses, Molds & smuts, Msg [monosodium glutamate], Pineapple, and Prednisone    Social History     Tobacco Use   • Smoking status: Every Day     Packs/day: 0.50     Years: 40.00     Pack years: 20.00     Types: Cigarettes     Start date: 1982   • Smokeless tobacco: Never   Vaping Use   • Vaping Use: Every day   • Substances: Flavoring   • Devices: Pre-filled or refillable cartridge   Substance Use Topics   • Alcohol use: Not Currently     Comment: Occasionally   • Drug use: Never       Family History   Problem Relation Age of Onset   • Hypertension Mother    • Thyroid disease Mother    • Hyperlipidemia Mother    • Osteoarthritis Mother    • Heart disease Mother    • Diabetes Father    • Stroke Father    • Skin cancer Father    • Heart failure Sister 38        Downs Syndrome   • Heart disease Maternal Grandfather   "  • Cancer Other    • Asthma Other    • Heart disease Other    • Hyperlipidemia Other    • Heart failure Other    • Hypertension Other    • Diabetes Other         Unspecified        Health Maintenance Due   Topic Date Due   • ANNUAL PHYSICAL  Never done   • ZOSTER VACCINE (1 of 2) Never done   • LUNG CANCER SCREENING  Never done   • MAMMOGRAM  01/21/2022        Immunization History   Administered Date(s) Administered   • Influenza TIV (IM) 10/21/2014   • Influenza, Unspecified 10/21/2014       Review of Systems   Constitutional: Positive for fatigue.   Respiratory: Negative for cough and shortness of breath.    Cardiovascular: Negative for chest pain.   Gastrointestinal: Negative for diarrhea, nausea and vomiting.        Objective       Vitals:    02/02/23 0718   BP: 139/85   Pulse: 75   Resp: 18   Temp: 98 °F (36.7 °C)   SpO2: 94%   Weight: 91 kg (200 lb 11.2 oz)   Height: 160 cm (63\")       Body mass index is 35.55 kg/m².         Physical Exam  Vitals reviewed.   Constitutional:       Appearance: Normal appearance. She is well-developed.   Cardiovascular:      Rate and Rhythm: Normal rate and regular rhythm.      Heart sounds: Normal heart sounds. No murmur heard.  Pulmonary:      Effort: Pulmonary effort is normal.      Breath sounds: Normal breath sounds.   Neurological:      Mental Status: She is alert and oriented to person, place, and time.      Cranial Nerves: No cranial nerve deficit.      Motor: No weakness.   Psychiatric:         Mood and Affect: Mood and affect normal.             Result Review :     The following data was reviewed by: LEONEL River on 02/02/2023:    Common labs    Common Labs 2/28/22 2/28/22 2/28/22 5/23/22 8/11/22 8/11/22 8/11/22 8/11/22 8/11/22    0928 0928 0928  0826 0826 0826 0826 0827   Glucose  99    100 (A)      BUN  16    12      Creatinine  0.52 (A)    0.59      Sodium  142    142      Potassium  4.1    4.1      Chloride  103    105      Calcium  9.0    9.3    "   Albumin  4.60    4.40      Total Bilirubin  0.3    0.3      Alkaline Phosphatase  69    62      AST (SGOT)  13    15      ALT (SGPT)  18    16      WBC 7.63   7.30    8.63    Hemoglobin 17.1 (A)   17.5 (A)    15.1    Hematocrit 49.7 (A)   51.1 (A)    44.7    Platelets 129 (A)   134 (A)    132 (A)    Total Cholesterol   122    117     Triglycerides   93    80     HDL Cholesterol   50    48     LDL Cholesterol    54    53     Hemoglobin A1C     5.40       Microalbumin, Urine         <1.2   (A) Abnormal value            Most Recent A1C    HGBA1C Most Recent 8/11/22   Hemoglobin A1C 5.40                          Assessment and Plan      Diagnoses and all orders for this visit:    1. Primary hypertension (Primary)  -     metoprolol succinate XL (TOPROL-XL) 100 MG 24 hr tablet; Take 1 tablet by mouth Daily.  Dispense: 90 tablet; Refill: 1  -     losartan (COZAAR) 25 MG tablet; Take 1 tablet by mouth Daily.  Dispense: 90 tablet; Refill: 1  -     hydroCHLOROthiazide (HYDRODIURIL) 25 MG tablet; Take 1 tablet by mouth Daily.  Dispense: 90 tablet; Refill: 1  -     CBC Auto Differential  -     Comprehensive Metabolic Panel  -     Hemoglobin A1c  -     Lipid Panel With / Chol / HDL Ratio  -     TSH    2. Intractable migraine without status migrainosus, unspecified migraine type  -     SUMAtriptan (Imitrex) 50 MG tablet; Take one tablet at onset of headache. May repeat dose one time in 2 hours if headache not relieved.  Dispense: 27 tablet; Refill: 1  -     Rimegepant Sulfate (Nurtec) 75 MG tablet dispersible tablet; Take 1 tablet by mouth Daily As Needed (headache).  Dispense: 30 tablet; Refill: 1    3. Mild intermittent asthma without complication  -     montelukast (SINGULAIR) 10 MG tablet; Take 1 tablet by mouth Every Night.  Dispense: 90 tablet; Refill: 1    4. Chronic bilateral low back pain without sciatica  -     ibuprofen (ADVIL,MOTRIN) 800 MG tablet; Take 1 tablet by mouth Every 8 (Eight) Hours As Needed for Mild  Pain.  Dispense: 270 tablet; Refill: 1  -     cyclobenzaprine (FLEXERIL) 10 MG tablet; Take 1 tablet by mouth Every Night. for muscle spams  Dispense: 90 tablet; Refill: 1    5. Screening mammogram for breast cancer  -     Mammo Screening Digital Tomosynthesis Bilateral With CAD; Future    6. Elevated glucose  -     TSH    7. Cigarette nicotine dependence without complication  -      CT Chest Low Dose Cancer Screening WO; Future            Follow Up     Return in about 6 months (around 8/2/2023).  Follow-up in 6 months for labs and appt. Call with any concerns or questions that you may have regarding your medications or history.    We will set up mammo and ct lung.  I have reviewed all medications and at this time no medications changes need to be adjusted for all chronic conditions.    Patient was given instructions and counseling regarding her condition or for health maintenance advice. Please see specific information pulled into the AVS if appropriate.     Parts of this note are electronic transcriptions/translations of spoken language to printed text using the Dragon Dictation system.          Lisseth Viveros, LEONEL  02/02/2023

## 2023-02-06 ENCOUNTER — TREATMENT (OUTPATIENT)
Dept: PHYSICAL THERAPY | Facility: CLINIC | Age: 53
End: 2023-02-06
Payer: OTHER MISCELLANEOUS

## 2023-02-06 DIAGNOSIS — S80.01XD CONTUSION OF RIGHT KNEE, SUBSEQUENT ENCOUNTER: Primary | ICD-10-CM

## 2023-02-06 DIAGNOSIS — R26.2 DIFFICULTY WALKING: ICD-10-CM

## 2023-02-06 DIAGNOSIS — M25.661 KNEE STIFFNESS, RIGHT: ICD-10-CM

## 2023-02-06 DIAGNOSIS — M25.561 RIGHT KNEE PAIN, UNSPECIFIED CHRONICITY: ICD-10-CM

## 2023-02-06 PROCEDURE — 97530 THERAPEUTIC ACTIVITIES: CPT | Performed by: PHYSICAL THERAPIST

## 2023-02-06 PROCEDURE — 97110 THERAPEUTIC EXERCISES: CPT | Performed by: PHYSICAL THERAPIST

## 2023-02-06 NOTE — PROGRESS NOTES
Re-Assessment / Re-Certification  [facility] [address] [place] [address]      Patient: Maureen Courtney   : 1970  Diagnosis/ICD-10 Code:  Contusion of right knee, subsequent encounter [S80.01XD]  Referring practitioner: JAKE Lorenz  Date of Initial Visit: Type: THERAPY  Noted: 11/15/2022  Today's Date: 2023  Patient seen for 23 sessions      Subjective:   Subjective Questionnaire: LEFS:  = 27.5% Function  Clinical Progress: unchanged  Home Program Compliance: Yes  Treatment has included: therapeutic exercise, manual therapy and therapeutic activity    Subjective   The patient reported that her knee pain level today is a 7/10. 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 When she stands for longer than 10 minutes her right foot goes numb and the pain in her knee is worse. Since starting PT, she can tell that her flexibility and strength have improved. However, the pain in her right knee (over distal medial quadriceps) has been unchanged. Additionally, she now has pain over the medial joint line in her right knee. She follows back up with [facility] next week.    Objective          Tenderness     Additional Tenderness Details  Tenderness in right knee over VMO, medial patellar border, medial joint line, and hip adductor muscle group.    Neurological Testing     Additional Neurological Details  Sensation to light touch intact and equal bilaterally from L2-S1.    Active Range of Motion     Right Knee   Flexion: 122 degrees   Extension: 0 degrees   Extensor la degrees     Passive Range of Motion     Right Knee   Flexion: 135 degrees   Extension: 0 degrees     Patellar Mobility   Left Knee Patellar tendons within functional limits include the medial, lateral, superior and inferior.     Right Knee Patellar tendons within functional limits include the medial, lateral, superior and inferior.     Strength/Myotome Testing     Left Hip   Planes of Motion   Flexion:  4+    Right Hip   Planes of Motion   Flexion: 4    Left Knee   Quadriceps contraction: good    Right Knee   Flexion: 4+  Extension: 4+  Quadriceps contraction: good    Ambulation     Ambulation: Level Surfaces     Additional Level Surfaces Ambulation Details  The patient ambulates without an AD with slight lateral weight shift to the left.       See Exercise, Manual, and Modality Logs for complete treatment.     Assessment & Plan     Assessment  Impairments: abnormal gait, abnormal or restricted ROM, activity intolerance, impaired physical strength, pain with function and safety issue  Functional Limitations: walking, uncomfortable because of pain, standing and stooping  Assessment details: The patient was re-evaluated today and presents with improvement in right knee strength, but no other significant changes in subjective or objective measurements. Her progress in PT has stalled over the past month. She continues to present with a high level of pain and now symptoms of numbness/tingling into the right foot which may indicate neural involvement. She is being placed on hold from PT at this time until she follows back up with [facility] due to lack of functional progress over the past month.  Prognosis: fair    Goals  Plan Goals: KNEE PROBLEMS:     1. The patient has limited ROM of the right knee.              LTG 1: 12 weeks:  The patient will demonstrate 0 to 140 degrees of ROM for the right knee in order to allow patient to walk, ascend/descend stairs, and return to sports without limitation.                          STATUS:  Progressing              STG 1a: 6 weeks:  The patient will demonstrate 0 to 120 degrees of ROM for the right knee.                          STATUS:  Met    2. The patient has limited strength of the right knee.              LTG 2: 12 weeks: The patient will demonstrate 5 /5 strength for right knee flexion and extension in order to allow patient improved joint stability                           STATUS:   Progressing              STG 2a: 6 weeks: The patient will demonstrate 4+ /5 strength for right knee flexion and extension                          STATUS:   Met                3. The patient has gait dysfunction.              LTG 3: 12 weeks:  The patient will ambulate without assistive device, independently, for community distances with normal biomechanics of the right lower extremity in order to improve mobility and allow patient to perform activities such as grocery shopping with greater ease.                          STATUS:   Progressing    4. The patient has limited strength of the left hip.              LTG 4: 12 weeks: The patient will demonstrate 5 /5 strength for right hip flexion, abduction, and extension in order to allow patient improved joint stability                          STATUS:   Progressing              STG 4a: 6 weeks: The patient will demonstrate 4+ /5 strength for right hip flexion, abduction, and extension                          STATUS:   Progressing    5. Mobility: Walking/Moving Around Functional Limitation                               LTG 5: 12 weeks:  The patient will demonstrate 25 % limitation by achieving a score of 60/80 on the LEFS.                          STATUS:   Progressing              STG 5 a: 6 weeks:  The patient will demonstrate 37.5 % limitation by achieving a score of 50/80 on the LEFS.                             STATUS:  Progressing    Plan  Therapy options: will be seen for skilled therapy services  Planned modality interventions: cryotherapy, electrical stimulation/Russian stimulation and TENS  Planned therapy interventions: balance/weight-bearing training, ADL retraining, soft tissue mobilization, strengthening, stretching, therapeutic activities, joint mobilization, home exercise program, gait training, functional ROM exercises, flexibility, body mechanics training, postural training, neuromuscular re-education and manual therapy  Treatment plan  discussed with: patient      Progress toward previous goals: Partially Met      Recommendations: Place on hold from PT until after MD follow up.    Prognosis to achieve goals: fair    PT Signature: Wil Medina PT    Electronically signed 2023    [place] License: PT - [id number]       Based upon review of the patient's progress and continued therapy plan, it is my medical opinion that Maureen Courtney should continue physical therapy treatment at [facility]  [address]  [place] [address] [phone].    Signature: __________________________________  Loi Nicholson PA    90 Day Recertification  Certification Period: 2023 thru 2023  I certify that the therapy services are furnished while this patient is under my care.  The services outlined above are required by this patient, and will be reviewed every 90 days.     PHYSICIAN: Loi Nicholson PA  NPI: [id number]      DATE:     Please sign and return via fax to [phone]. Thank you, [facility].    Timed:  Manual Therapy:    0     mins  39228;  Therapeutic Exercise:    25     mins  56987;     Neuromuscular Una:    0    mins  99843;    Therapeutic Activity:     10     mins  66721;     Gait Trainin     mins  08809;     Aquatics                         0      mins  45619    Un-timed:  Mechanical Traction      0     mins  74132  Dry Needling     0     mins self-pay  Electrical Stimulation:    0     mins  76383 ( );    Timed Treatment:   35   mins   Total Treatment:     35   mins

## 2023-02-14 ENCOUNTER — OFFICE VISIT (OUTPATIENT)
Dept: ORTHOPEDIC SURGERY | Facility: CLINIC | Age: 53
End: 2023-02-14
Payer: OTHER MISCELLANEOUS

## 2023-02-14 VITALS — OXYGEN SATURATION: 98 % | BODY MASS INDEX: 35.55 KG/M2 | WEIGHT: 200.62 LBS | HEART RATE: 114 BPM | HEIGHT: 63 IN

## 2023-02-14 DIAGNOSIS — M17.11 PRIMARY OSTEOARTHRITIS OF RIGHT KNEE: Primary | ICD-10-CM

## 2023-02-14 DIAGNOSIS — S86.911D STRAIN OF RIGHT KNEE, SUBSEQUENT ENCOUNTER: ICD-10-CM

## 2023-02-14 DIAGNOSIS — M25.561 RIGHT KNEE PAIN, UNSPECIFIED CHRONICITY: ICD-10-CM

## 2023-02-14 PROCEDURE — 99213 OFFICE O/P EST LOW 20 MIN: CPT | Performed by: PHYSICIAN ASSISTANT

## 2023-02-14 NOTE — PROGRESS NOTES
"Chief Complaint  Pain and Follow-up of the Right Knee    Subjective          History of Present Illness      Maureen Courtney is a 52 y.o. female  presents to [facility] for     Patient presents for follow-up evaluation right knee pain, right knee osteoarthritis, right knee strain and injury.  She is being seen for right knee Workmen's Comp. injury which occurred in October.  She has had x-rays, MRI Dr. Velez reviewed her MRI with her on her first visit and discussed having a PRP injection administered to her.  Patient received the PRP injection on 1/3/2023.  She states it had no benefit or relief to her knee pain.  She states the knee pain continue/persist in the anterior medial knee.  Patient has pain with flexion of the knee, pain with straightening her knee.  She states she gets a \"pinching \"in the anterior knee below her kneecap.  She has tried conservative treatments including diclofenac, meloxicam with no relief.  She cannot receive steroid or viscosupplementation due to allergy to ingredients.  She states she did 12 weeks of physical therapy she finished it last week at the [facility] office.  She states her knee causes her pain when standing for long.  She is mainly sitting at her job on light duty.  She states she even has difficulty standing to cook dinner for her family.  She states she has a decreased sensation to the medial lower leg/tibia region that she states causes her foot to go to sleep.  She denies calf pain      Allergies   Allergen Reactions   • Beef Allergy Unknown - Low Severity and Anaphylaxis   • Eggs Or Egg-Derived Products Shortness Of Breath   • Influenza Virus Vaccine Anaphylaxis   • Pork Allergy Anaphylaxis   • Shellfish-Derived Products Anaphylaxis   • Tetanus Toxoid Anaphylaxis   • Cephalexin Hives   • Coffee Itching   • Dust Mite Extract Unknown - Low Severity   • Egg Phospholipids Itching   • Erythromycin Hives   • Mixed Grasses Unknown - Low " "Severity   • Molds & Smuts Unknown - Low Severity   • Msg [Monosodium Glutamate] Unknown - Low Severity   • Pineapple Itching   • Prednisone Itching     CANNOT GET PREDNISONE SHOT- CAN TAKE PILL FORM        Social History     Socioeconomic History   • Marital status:    Tobacco Use   • Smoking status: Every Day     Packs/day: 0.50     Years: 40.00     Pack years: 20.00     Types: Cigarettes     Start date: 1982   • Smokeless tobacco: Never   Vaping Use   • Vaping Use: Every day   • Substances: Flavoring   • Devices: Pre-filled or refillable cartridge   Substance and Sexual Activity   • Alcohol use: Not Currently     Comment: Occasionally   • Drug use: Never   • Sexual activity: Defer        REVIEW OF SYSTEMS    Constitutional: Denies fevers, chills, weight loss  Cardiovascular: Denies chest pain, shortness of breath  Skin: Denies rashes, acute skin changes  Neurologic: Denies headache, loss of consciousness  MSK: Right knee pain      Objective   Vital Signs:   Pulse 114   Ht 160 cm (63\")   Wt 91 kg (200 lb 9.9 oz)   SpO2 98%   BMI 35.54 kg/m²     Body mass index is 35.54 kg/m².    Physical Exam    Right knee: Skin is intact, no erythema, no ecchymosis, no swelling, no effusion, no signs of infection, full extension, flexion 120 with pain exhibited with flexion.  Stable anterior/posterior drawer, negative Lachman, stable to varus/valgus stress, positive EHL, FHL, GS and TA.  Sensation intact all 5 nerves of the foot positive pulses, negative Giovanny.    Procedures    Imaging Results (Most Recent)     None           Result Review :   The following data was reviewed by: JAKE Armijo on 02/14/2023:               Assessment and Plan    Diagnoses and all orders for this visit:    1. Primary osteoarthritis of right knee (Primary)  -     Ambulatory Referral to Physical Therapy Evaluate and treat (2-3X/WEEK FOR 6-8 WEEKS)    2. Strain of right knee, subsequent encounter  -     Ambulatory Referral " to Physical Therapy Evaluate and treat (2-3X/WEEK FOR 6-8 WEEKS)    3. Right knee pain, unspecified chronicity  -     Ambulatory Referral to Physical Therapy Evaluate and treat (2-3X/WEEK FOR 6-8 WEEKS)        Discussed diagnosis and treatment options with the patient due to continued/persistent pain she will continue light duty at work, we discussed restarting, continuing physical therapy new order was given.  She was given pain cream prescription to apply to the areas of pain, follow-up in 6 weeks for recheck, may consider updated MRI or referral for second opinion at next visit.    Call or return if worsening symptoms.    Follow Up   Return in about 6 weeks (around 3/28/2023) for Recheck.  Patient was given instructions and counseling regarding her condition or for health maintenance advice. Please see specific information pulled into the AVS if appropriate.

## 2023-02-16 ENCOUNTER — TREATMENT (OUTPATIENT)
Dept: PHYSICAL THERAPY | Facility: CLINIC | Age: 53
End: 2023-02-16
Payer: OTHER MISCELLANEOUS

## 2023-02-16 DIAGNOSIS — M25.661 KNEE STIFFNESS, RIGHT: ICD-10-CM

## 2023-02-16 DIAGNOSIS — M54.50 CHRONIC BILATERAL LOW BACK PAIN WITHOUT SCIATICA: ICD-10-CM

## 2023-02-16 DIAGNOSIS — R26.2 DIFFICULTY WALKING: ICD-10-CM

## 2023-02-16 DIAGNOSIS — S80.01XD CONTUSION OF RIGHT KNEE, SUBSEQUENT ENCOUNTER: Primary | ICD-10-CM

## 2023-02-16 DIAGNOSIS — G89.29 CHRONIC BILATERAL LOW BACK PAIN WITHOUT SCIATICA: ICD-10-CM

## 2023-02-16 DIAGNOSIS — M25.561 RIGHT KNEE PAIN, UNSPECIFIED CHRONICITY: ICD-10-CM

## 2023-02-16 PROCEDURE — 97530 THERAPEUTIC ACTIVITIES: CPT | Performed by: PHYSICAL THERAPIST

## 2023-02-16 PROCEDURE — 97110 THERAPEUTIC EXERCISES: CPT | Performed by: PHYSICAL THERAPIST

## 2023-02-16 RX ORDER — CYCLOBENZAPRINE HCL 10 MG
10 TABLET ORAL NIGHTLY
Qty: 90 TABLET | Refills: 1 | OUTPATIENT
Start: 2023-02-16

## 2023-02-16 NOTE — PROGRESS NOTES
Physical Therapy Daily Treatment Note      Patient: Maureen Courtney   : 1970  Referring practitioner: No ref. provider found  Date of Initial Visit: Type: THERAPY  Noted: 11/15/2022  Today's Date: 2023  Patient seen for 24 sessions           Subjective Questionnaire:       Subjective Evaluation    History of Present Illness    Subjective comment: Pt reports pain level is 5-6/10.  Pt reports she hasn't been doing anything different except maybe a little more walking stating she has been having to go get her parts at work vs them being delivered to her.         Objective   See Exercise, Manual, and Modality Logs for complete treatment.       Assessment & Plan     Assessment    Assessment details: Pt tolerates strengthening with 5-6/10 pain stating it increased with step pu and over on 8 inch step.  Pt exhibits continued strengthening.  Pt reports R knee pain is constant at medial knee. Pt ambulates with antalgic gait and wears short R knee brace.  Pt reports posterior knee pain with SLR.  Pt saw Brad Brannon, yesterday and was advised to continue PT.        Visit Diagnoses:    ICD-10-CM ICD-9-CM   1. Contusion of right knee, subsequent encounter  S80.01XD V58.89     924.11   2. Right knee pain, unspecified chronicity  M25.561 719.46   3. Knee stiffness, right  M25.661 719.56   4. Difficulty walking  R26.2 719.7       Progress per Plan of Care and Progress strengthening /stabilization /functional activity           Timed:  Manual Therapy:         mins  52762;  Therapeutic Exercise:    41     mins  00707;     Neuromuscular Una:        mins  35761;    Therapeutic Activity:     15     mins  79383;     Gait Training:           mins  48302;     Ultrasound:          mins  42632;    Electrical Stimulation:         mins  69536 ( );  Aquatic Therapy          mins  79419    Untimed:  Electrical Stimulation:         mins  71272 ( );  Mechanical Traction:         mins  55068;     Timed Treatment:    56   mins   Total Treatment:     56   mins    Electronically signed    Melody Fuentes PTA  Physical Therapist Assistant    [facility] license: [id number]

## 2023-02-16 NOTE — TELEPHONE ENCOUNTER
Caller: Maureen Courtney    Relationship: Self    Best call back number: [phone]    Requested Prescriptions:   Requested Prescriptions     Pending Prescriptions Disp Refills   • cyclobenzaprine (FLEXERIL) 10 MG tablet 90 tablet 1     Sig: Take 1 tablet by mouth Every Night. for muscle [facility]        Pharmacy where request should be sent: [address] [phone] [facility] [phone] [facility]       Does the patient have less than a 3 day supply:  [x] Yes  [] No    Would you like a call back once the refill request has been completed: [x] Yes [] No    If the office needs to give you a call back, can they leave a voicemail: [x] Yes [] No    Marycruz Bacon Rep   02/16/23 14:30 EST

## 2023-02-20 ENCOUNTER — TELEPHONE (OUTPATIENT)
Dept: PHYSICAL THERAPY | Facility: OTHER | Age: 53
End: 2023-02-20
Payer: COMMERCIAL

## 2023-02-20 NOTE — TELEPHONE ENCOUNTER
Caller: Maureen Courtney    Relationship: Self    What was the call regarding: PATIENT CANCELLED APPT TODAY BECAUSE THEY CANT MAKE IT

## 2023-03-28 ENCOUNTER — OFFICE VISIT (OUTPATIENT)
Dept: ORTHOPEDIC SURGERY | Facility: CLINIC | Age: 53
End: 2023-03-28
Payer: OTHER MISCELLANEOUS

## 2023-03-28 VITALS — WEIGHT: 206.57 LBS | BODY MASS INDEX: 34.42 KG/M2 | HEART RATE: 85 BPM | OXYGEN SATURATION: 97 % | HEIGHT: 65 IN

## 2023-03-28 DIAGNOSIS — M17.11 PRIMARY OSTEOARTHRITIS OF RIGHT KNEE: Primary | ICD-10-CM

## 2023-03-28 DIAGNOSIS — S86.911D STRAIN OF RIGHT KNEE, SUBSEQUENT ENCOUNTER: ICD-10-CM

## 2023-03-28 DIAGNOSIS — M25.561 RIGHT KNEE PAIN, UNSPECIFIED CHRONICITY: ICD-10-CM

## 2023-03-28 PROCEDURE — 99213 OFFICE O/P EST LOW 20 MIN: CPT | Performed by: PHYSICIAN ASSISTANT

## 2023-03-28 NOTE — PROGRESS NOTES
Chief Complaint  Pain and Follow-up of the Right Knee    Subjective          History of Present Illness      Maureen Courtney is a 52 y.o. female  presents to [facility] for     Patient presents for follow-up evaluation of right knee pain, right knee osteoarthritis, right knee strain and injury.  This is a Workmen's Comp. injury which occurred in October.  She states her knee has been benefiting from the physical therapy she has been doing.  She states it helps the pain but she states that she still does have pain and states that she cannot stand for more than 15 minutes due to knee pain she points anterior medial knee as her area of pain with standing for too long.  She states she also has pain bending, straightening the leg.  She takes meloxicam, Flexeril.  She is working light duty at work mainly seated work.    To review patient has had x-rays, MRI and Dr. Velez reviewed these with her in January on 1/3/2023.  She had a PRP injection because she cannot tolerate viscosupplementation or steroid injections and the PRP injection did not help.  She states that her knee pain causes difficulty with life activities including doing something as simple as cooking dinner for her family.      Allergies   Allergen Reactions   • Beef Allergy Unknown - Low Severity and Anaphylaxis   • Eggs Or Egg-Derived Products Shortness Of Breath   • Influenza Virus Vaccine Anaphylaxis   • Pork Allergy Anaphylaxis   • Shellfish-Derived Products Anaphylaxis   • Tetanus Toxoid Anaphylaxis   • Cephalexin Hives   • Coffee Itching   • Dust Mite Extract Unknown - Low Severity   • Egg Phospholipids Itching   • Erythromycin Hives   • Mixed Grasses Unknown - Low Severity   • Molds & Smuts Unknown - Low Severity   • Msg [Monosodium Glutamate] Unknown - Low Severity   • Pineapple Itching   • Prednisone Itching     CANNOT GET PREDNISONE SHOT- CAN TAKE PILL FORM        Social History     Socioeconomic History   • Marital  "status:    Tobacco Use   • Smoking status: Every Day     Packs/day: 0.50     Years: 40.00     Pack years: 20.00     Types: Cigarettes     Start date: 1982   • Smokeless tobacco: Never   Vaping Use   • Vaping Use: Some days   • Substances: Flavoring   • Devices: Pre-filled or refillable cartridge   Substance and Sexual Activity   • Alcohol use: Not Currently     Comment: Occasionally   • Drug use: Never   • Sexual activity: Defer        REVIEW OF SYSTEMS    Constitutional: Denies fevers, chills, weight loss  Cardiovascular: Denies chest pain, shortness of breath  Skin: Denies rashes, acute skin changes  Neurologic: Denies headache, loss of consciousness  MSK: Right knee pain      Objective   Vital Signs:   Pulse 85   Ht 165.1 cm (65\")   Wt 93.7 kg (206 lb 9.1 oz)   SpO2 97%   BMI 34.38 kg/m²     Body mass index is 34.38 kg/m².    Physical Exam    Right knee: Skin is intact, no erythema, no ecchymosis, no swelling, no effusion, no signs of infection, full extension, flexion 120, pain with flexion, tender to palpation of anterior medial knee, stable to varus/valgus stress, stable anterior/posterior drawer negative Lachman, negative Giovanny.    Procedures    Imaging Results (Most Recent)     None           Result Review :   The following data was reviewed by: JAKE Armijo on 03/28/2023:               Assessment and Plan    Diagnoses and all orders for this visit:    1. Primary osteoarthritis of right knee (Primary)  -     MRI Knee Right Without Contrast; Future    2. Strain of right knee, subsequent encounter  -     MRI Knee Right Without Contrast; Future    3. Right knee pain, unspecified chronicity  -     MRI Knee Right Without Contrast; Future        Discussed diagnosis and treatment options with the patient she was advised recommend a new MRI for the knee for further evaluation due to no improvement with conservative treatments follow-up after MRI.  May consider second opinion evaluation " from another orthopedic provider if no MRI approved/no improvement    Call or return if worsening symptoms.    Follow Up   Return for After MRI.  Patient was given instructions and counseling regarding her condition or for health maintenance advice. Please see specific information pulled into the AVS if appropriate.

## 2023-04-10 ENCOUNTER — DOCUMENTATION (OUTPATIENT)
Dept: PHYSICAL THERAPY | Facility: CLINIC | Age: 53
End: 2023-04-10
Payer: COMMERCIAL

## 2023-04-10 NOTE — PROGRESS NOTES
Discharge from PT. The patient is transferring to another facility per the [facility].

## 2023-04-17 DIAGNOSIS — M17.11 PRIMARY OSTEOARTHRITIS OF RIGHT KNEE: ICD-10-CM

## 2023-04-17 DIAGNOSIS — S86.911D STRAIN OF RIGHT KNEE, SUBSEQUENT ENCOUNTER: ICD-10-CM

## 2023-04-17 DIAGNOSIS — M25.561 RIGHT KNEE PAIN, UNSPECIFIED CHRONICITY: ICD-10-CM

## 2023-04-24 ENCOUNTER — OFFICE VISIT (OUTPATIENT)
Dept: ORTHOPEDIC SURGERY | Facility: CLINIC | Age: 53
End: 2023-04-24
Payer: OTHER MISCELLANEOUS

## 2023-04-24 VITALS — HEART RATE: 79 BPM | WEIGHT: 206.57 LBS | BODY MASS INDEX: 34.42 KG/M2 | HEIGHT: 65 IN | OXYGEN SATURATION: 95 %

## 2023-04-24 DIAGNOSIS — S89.91XD RIGHT KNEE INJURY, SUBSEQUENT ENCOUNTER: ICD-10-CM

## 2023-04-24 DIAGNOSIS — M25.561 RIGHT KNEE PAIN, UNSPECIFIED CHRONICITY: ICD-10-CM

## 2023-04-24 DIAGNOSIS — M17.11 PRIMARY OSTEOARTHRITIS OF RIGHT KNEE: Primary | ICD-10-CM

## 2023-04-24 NOTE — PROGRESS NOTES
Chief Complaint  Pain and Follow-up of the Right Knee    Subjective          History of Present Illness      Maureen Courtney is a 52 y.o. female  presents to [facility] for     Patient presents for follow-up evaluation of right knee pain, right knee osteoarthritis, right knee strain and to review new MRI that was ordered at last visit due to patient not having improvement.  This is a Workmen's Comp. injury which occurred in October.  Patient has been attending physical therapy at [facility] and she states they have been helping her more than other places but her knee is still not better.  She points to the anterior medial knee as her area of pain she states she cannot stand more than 20 minutes without severe pain to her medial knee.  She states she can even stand to cook at home due to the pain.  She has been mainly doing seated work at home.  She takes meloxicam and Flexeril.      Allergies   Allergen Reactions   • Beef Allergy Unknown - Low Severity and Anaphylaxis   • Eggs Or Egg-Derived Products Shortness Of Breath   • Influenza Virus Vaccine Anaphylaxis   • Pork Allergy Anaphylaxis   • Shellfish-Derived Products Anaphylaxis   • Tetanus Toxoid Anaphylaxis   • Cephalexin Hives   • Coffee Itching   • Dust Mite Extract Unknown - Low Severity   • Egg Phospholipids Itching   • Erythromycin Hives   • Mixed Grasses Unknown - Low Severity   • Molds & Smuts Unknown - Low Severity   • Msg [Monosodium Glutamate] Unknown - Low Severity   • Pineapple Itching   • Prednisone Itching     CANNOT GET PREDNISONE SHOT- CAN TAKE PILL FORM        Social History     Socioeconomic History   • Marital status:    Tobacco Use   • Smoking status: Every Day     Packs/day: 0.50     Years: 40.00     Pack years: 20.00     Types: Cigarettes     Start date: 1982   • Smokeless tobacco: Never   Vaping Use   • Vaping Use: Some days   • Substances: Flavoring   • Devices: Pre-filled or refillable cartridge   Substance  "and Sexual Activity   • Alcohol use: Not Currently     Comment: Occasionally   • Drug use: Never   • Sexual activity: Defer        REVIEW OF SYSTEMS    Constitutional: Denies fevers, chills, weight loss  Cardiovascular: Denies chest pain, shortness of breath  Skin: Denies rashes, acute skin changes  Neurologic: Denies headache, loss of consciousness  MSK: Right knee pain      Objective   Vital Signs:   Pulse 79   Ht 165.1 cm (65\")   Wt 93.7 kg (206 lb 9.1 oz)   SpO2 95%   BMI 34.38 kg/m²     Body mass index is 34.38 kg/m².    Physical Exam    Right knee: Tender to palpation anterior medial knee along the joint line, full extension, flexion 120, pain with flexion, strength appropriate, stable to varus/valgus stress, stable anterior/posterior drawer, negative Lachman, negative Giovanny    Procedures    Imaging Results (Most Recent)     None       MRI right knee without contrast, 4/13/2023, impression: 1.  Normal menisci, 2.  No cruciate or collateral ligament tears.  3.  Stable chondromalacia of the patella and medial femoral condyle.  3 4.  Interval resolution of previously noted edema in the distal vastus medialis.  5.  Mild Miles increased signal in the medial head gastrocnemius tendon at the femoral attachment with minimal reactive enthesopathic marrow changes which may reflect tendinosis in the appropriate clinical setting    Result Review :   The following data was reviewed by: JAKE Armijo on 04/24/2023:  Data reviewed: Radiologic studies Reviewed by me with the patient             Assessment and Plan    Diagnoses and all orders for this visit:    1. Primary osteoarthritis of right knee (Primary)    2. Right knee pain, unspecified chronicity    3. Right knee injury, subsequent encounter        Reviewed MRI with the patient discussed diagnosis and treatment options with her based on patient unable to receive steroid or viscosupplementation injections, PRP injection did not help, physical " therapy is not helping, anti-inflammatory medications are not helping and surgery is not indicated patient was advised we recommend a second opinion through her Workmen's Comp. provider for reevaluation, follow-up as needed with our office, patient agreed, continue light duty.    Call or return if worsening symptoms.    Follow Up   Return if symptoms worsen or fail to improve.  Patient was given instructions and counseling regarding her condition or for health maintenance advice. Please see specific information pulled into the AVS if appropriate.

## 2023-04-28 ENCOUNTER — DOCUMENTATION (OUTPATIENT)
Dept: PHYSICAL THERAPY | Facility: CLINIC | Age: 53
End: 2023-04-28
Payer: COMMERCIAL

## 2023-04-28 NOTE — PROGRESS NOTES
This patient is to be discharged from PT. The last update on her progress can be viewed below from her re-evaluation on 23.    Subjective   The patient reported that her knee pain level today is a 7/10. 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 When she stands for longer than 10 minutes her right foot goes numb and the pain in her knee is worse. Since starting PT, she can tell that her flexibility and strength have improved. However, the pain in her right knee (over distal medial quadriceps) has been unchanged. Additionally, she now has pain over the medial joint line in her right knee. She follows back up with [facility] next week.     Objective            Tenderness     Additional Tenderness Details  Tenderness in right knee over VMO, medial patellar border, medial joint line, and hip adductor muscle group.     Neurological Testing     Additional Neurological Details  Sensation to light touch intact and equal bilaterally from L2-S1.     Active Range of Motion      Right Knee   Flexion: 122 degrees   Extension: 0 degrees   Extensor la degrees      Passive Range of Motion      Right Knee   Flexion: 135 degrees   Extension: 0 degrees      Patellar Mobility   Left Knee Patellar tendons within functional limits include the medial, lateral, superior and inferior.      Right Knee Patellar tendons within functional limits include the medial, lateral, superior and inferior.      Strength/Myotome Testing      Left Hip   Planes of Motion   Flexion: 4+     Right Hip   Planes of Motion   Flexion: 4     Left Knee   Quadriceps contraction: good     Right Knee   Flexion: 4+  Extension: 4+  Quadriceps contraction: good     Ambulation      Ambulation: Level Surfaces     Additional Level Surfaces Ambulation Details  The patient ambulates without an AD with slight lateral weight shift to the left.       See Exercise, Manual, and Modality Logs for complete treatment.      Assessment & Plan       Assessment  Impairments: abnormal gait, abnormal or restricted ROM, activity intolerance, impaired physical strength, pain with function and safety issue  Functional Limitations: walking, uncomfortable because of pain, standing and stooping  Assessment details: The patient was re-evaluated today and presents with improvement in right knee strength, but no other significant changes in subjective or objective measurements. Her progress in PT has stalled over the past month. She continues to present with a high level of pain and now symptoms of numbness/tingling into the right foot which may indicate neural involvement. She is being placed on hold from PT at this time until she follows back up with [facility] due to lack of functional progress over the past month.  Prognosis: fair   Goals  Plan Goals: KNEE PROBLEMS:     1. The patient has limited ROM of the right knee.              LTG 1: 12 weeks:  The patient will demonstrate 0 to 140 degrees of ROM for the right knee in order to allow patient to walk, ascend/descend stairs, and return to sports without limitation.                          STATUS:  Progressing              STG 1a: 6 weeks:  The patient will demonstrate 0 to 120 degrees of ROM for the right knee.                          STATUS:  Met    2. The patient has limited strength of the right knee.              LTG 2: 12 weeks: The patient will demonstrate 5 /5 strength for right knee flexion and extension in order to allow patient improved joint stability                          STATUS:   Progressing              STG 2a: 6 weeks: The patient will demonstrate 4+ /5 strength for right knee flexion and extension                          STATUS:   Met                3. The patient has gait dysfunction.              LTG 3: 12 weeks:  The patient will ambulate without assistive device, independently, for community distances with normal biomechanics of the right lower extremity in order to improve mobility and  allow patient to perform activities such as grocery shopping with greater ease.                          STATUS:   Progressing    4. The patient has limited strength of the left hip.              LTG 4: 12 weeks: The patient will demonstrate 5 /5 strength for right hip flexion, abduction, and extension in order to allow patient improved joint stability                          STATUS:   Progressing              STG 4a: 6 weeks: The patient will demonstrate 4+ /5 strength for right hip flexion, abduction, and extension                          STATUS:   Progressing    5. Mobility: Walking/Moving Around Functional Limitation                               LTG 5: 12 weeks:  The patient will demonstrate 25 % limitation by achieving a score of 60/80 on the LEFS.                          STATUS:   Progressing              STG 5 a: 6 weeks:  The patient will demonstrate 37.5 % limitation by achieving a score of 50/80 on the LEFS.                             STATUS:  Progressing

## 2023-08-02 ENCOUNTER — OFFICE VISIT (OUTPATIENT)
Dept: FAMILY MEDICINE CLINIC | Facility: CLINIC | Age: 53
End: 2023-08-02
Payer: COMMERCIAL

## 2023-08-02 VITALS
OXYGEN SATURATION: 94 % | TEMPERATURE: 97.6 F | HEART RATE: 71 BPM | WEIGHT: 205.4 LBS | SYSTOLIC BLOOD PRESSURE: 140 MMHG | RESPIRATION RATE: 20 BRPM | BODY MASS INDEX: 34.22 KG/M2 | DIASTOLIC BLOOD PRESSURE: 100 MMHG | HEIGHT: 65 IN

## 2023-08-02 DIAGNOSIS — G43.919 INTRACTABLE MIGRAINE WITHOUT STATUS MIGRAINOSUS, UNSPECIFIED MIGRAINE TYPE: ICD-10-CM

## 2023-08-02 DIAGNOSIS — E66.9 CLASS 1 OBESITY WITH SERIOUS COMORBIDITY AND BODY MASS INDEX (BMI) OF 34.0 TO 34.9 IN ADULT, UNSPECIFIED OBESITY TYPE: ICD-10-CM

## 2023-08-02 DIAGNOSIS — I10 PRIMARY HYPERTENSION: ICD-10-CM

## 2023-08-02 DIAGNOSIS — J45.20 MILD INTERMITTENT ASTHMA WITHOUT COMPLICATION: ICD-10-CM

## 2023-08-02 DIAGNOSIS — G89.29 CHRONIC BILATERAL LOW BACK PAIN WITHOUT SCIATICA: ICD-10-CM

## 2023-08-02 DIAGNOSIS — E11.9 TYPE 2 DIABETES MELLITUS WITHOUT COMPLICATION, WITHOUT LONG-TERM CURRENT USE OF INSULIN: Primary | ICD-10-CM

## 2023-08-02 DIAGNOSIS — F17.210 CIGARETTE NICOTINE DEPENDENCE WITHOUT COMPLICATION: ICD-10-CM

## 2023-08-02 DIAGNOSIS — M54.50 CHRONIC BILATERAL LOW BACK PAIN WITHOUT SCIATICA: ICD-10-CM

## 2023-08-02 LAB
ALBUMIN SERPL-MCNC: 4.8 G/DL (ref 3.5–5.2)
ALBUMIN UR-MCNC: <1.2 MG/DL
ALBUMIN/GLOB SERPL: 1.7 G/DL
ALP SERPL-CCNC: 72 U/L (ref 39–117)
ALT SERPL W P-5'-P-CCNC: 20 U/L (ref 1–33)
ANION GAP SERPL CALCULATED.3IONS-SCNC: 8 MMOL/L (ref 5–15)
AST SERPL-CCNC: 16 U/L (ref 1–32)
BASOPHILS # BLD AUTO: 0.02 10*3/MM3 (ref 0–0.2)
BASOPHILS NFR BLD AUTO: 0.3 % (ref 0–1.5)
BILIRUB SERPL-MCNC: 0.3 MG/DL (ref 0–1.2)
BUN SERPL-MCNC: 10 MG/DL (ref 6–20)
BUN/CREAT SERPL: 20 (ref 7–25)
CALCIUM SPEC-SCNC: 9.8 MG/DL (ref 8.6–10.5)
CHLORIDE SERPL-SCNC: 104 MMOL/L (ref 98–107)
CHOLEST SERPL-MCNC: 114 MG/DL (ref 0–200)
CO2 SERPL-SCNC: 29 MMOL/L (ref 22–29)
CREAT SERPL-MCNC: 0.5 MG/DL (ref 0.57–1)
DEPRECATED RDW RBC AUTO: 45.3 FL (ref 37–54)
EGFRCR SERPLBLD CKD-EPI 2021: 113.7 ML/MIN/1.73
EOSINOPHIL # BLD AUTO: 0.11 10*3/MM3 (ref 0–0.4)
EOSINOPHIL NFR BLD AUTO: 1.5 % (ref 0.3–6.2)
ERYTHROCYTE [DISTWIDTH] IN BLOOD BY AUTOMATED COUNT: 13.5 % (ref 12.3–15.4)
GLOBULIN UR ELPH-MCNC: 2.8 GM/DL
GLUCOSE SERPL-MCNC: 100 MG/DL (ref 65–99)
HBA1C MFR BLD: 5.6 % (ref 4.8–5.6)
HCT VFR BLD AUTO: 44.8 % (ref 34–46.6)
HDLC SERPL QL: 2.19
HDLC SERPL-MCNC: 52 MG/DL (ref 40–60)
HGB BLD-MCNC: 15.1 G/DL (ref 12–15.9)
LDLC SERPL CALC-MCNC: 47 MG/DL (ref 0–100)
LYMPHOCYTES # BLD AUTO: 2.11 10*3/MM3 (ref 0.7–3.1)
LYMPHOCYTES NFR BLD AUTO: 29 % (ref 19.6–45.3)
MCH RBC QN AUTO: 30.8 PG (ref 26.6–33)
MCHC RBC AUTO-ENTMCNC: 33.7 G/DL (ref 31.5–35.7)
MCV RBC AUTO: 91.2 FL (ref 79–97)
MONOCYTES # BLD AUTO: 0.53 10*3/MM3 (ref 0.1–0.9)
MONOCYTES NFR BLD AUTO: 7.3 % (ref 5–12)
NEUTROPHILS NFR BLD AUTO: 4.45 10*3/MM3 (ref 1.7–7)
NEUTROPHILS NFR BLD AUTO: 61.2 % (ref 42.7–76)
PLATELET # BLD AUTO: 172 10*3/MM3 (ref 140–450)
PMV BLD AUTO: 14 FL (ref 6–12)
POTASSIUM SERPL-SCNC: 4.1 MMOL/L (ref 3.5–5.2)
PROT SERPL-MCNC: 7.6 G/DL (ref 6–8.5)
RBC # BLD AUTO: 4.91 10*6/MM3 (ref 3.77–5.28)
SODIUM SERPL-SCNC: 141 MMOL/L (ref 136–145)
TRIGL SERPL-MCNC: 69 MG/DL (ref 0–150)
TSH SERPL DL<=0.05 MIU/L-ACNC: 1.13 UIU/ML (ref 0.27–4.2)
VLDLC SERPL-MCNC: 15 MG/DL (ref 5–40)
WBC NRBC COR # BLD: 7.27 10*3/MM3 (ref 3.4–10.8)

## 2023-08-02 PROCEDURE — 80061 LIPID PANEL: CPT | Performed by: NURSE PRACTITIONER

## 2023-08-02 PROCEDURE — 80050 GENERAL HEALTH PANEL: CPT | Performed by: NURSE PRACTITIONER

## 2023-08-02 PROCEDURE — 82043 UR ALBUMIN QUANTITATIVE: CPT | Performed by: NURSE PRACTITIONER

## 2023-08-02 PROCEDURE — 83036 HEMOGLOBIN GLYCOSYLATED A1C: CPT | Performed by: NURSE PRACTITIONER

## 2023-08-02 RX ORDER — ALBUTEROL SULFATE 90 UG/1
2 AEROSOL, METERED RESPIRATORY (INHALATION) EVERY 4 HOURS PRN
Qty: 8 G | Refills: 5 | Status: SHIPPED | OUTPATIENT
Start: 2023-08-02

## 2023-08-02 RX ORDER — LOSARTAN POTASSIUM 25 MG/1
25 TABLET ORAL DAILY
Qty: 30 TABLET | Refills: 5 | Status: SHIPPED | OUTPATIENT
Start: 2023-08-02

## 2023-08-02 RX ORDER — METOPROLOL SUCCINATE 100 MG/1
100 TABLET, EXTENDED RELEASE ORAL DAILY
Qty: 30 TABLET | Refills: 5 | Status: SHIPPED | OUTPATIENT
Start: 2023-08-02

## 2023-08-02 RX ORDER — CYCLOBENZAPRINE HCL 10 MG
10 TABLET ORAL NIGHTLY
Qty: 30 TABLET | Refills: 5 | Status: SHIPPED | OUTPATIENT
Start: 2023-08-02

## 2023-08-02 RX ORDER — SUMATRIPTAN 50 MG/1
TABLET, FILM COATED ORAL
Qty: 27 TABLET | Refills: 1 | Status: SHIPPED | OUTPATIENT
Start: 2023-08-02

## 2023-08-02 RX ORDER — MONTELUKAST SODIUM 10 MG/1
10 TABLET ORAL NIGHTLY
Qty: 30 TABLET | Refills: 5 | Status: SHIPPED | OUTPATIENT
Start: 2023-08-02

## 2023-08-02 RX ORDER — HYDROCHLOROTHIAZIDE 25 MG/1
25 TABLET ORAL DAILY
Qty: 30 TABLET | Refills: 5 | Status: SHIPPED | OUTPATIENT
Start: 2023-08-02

## 2023-08-02 RX ORDER — IBUPROFEN 800 MG/1
800 TABLET ORAL EVERY 8 HOURS PRN
Qty: 90 TABLET | Refills: 5 | Status: SHIPPED | OUTPATIENT
Start: 2023-08-02

## 2023-08-02 RX ORDER — RIMEGEPANT SULFATE 75 MG/75MG
75 TABLET, ORALLY DISINTEGRATING ORAL DAILY PRN
Qty: 30 TABLET | Refills: 1 | Status: SHIPPED | OUTPATIENT
Start: 2023-08-02

## 2023-11-08 ENCOUNTER — TELEMEDICINE (OUTPATIENT)
Dept: FAMILY MEDICINE CLINIC | Facility: CLINIC | Age: 53
End: 2023-11-08
Payer: COMMERCIAL

## 2023-11-08 DIAGNOSIS — R20.0 NUMBNESS: ICD-10-CM

## 2023-11-08 DIAGNOSIS — M25.552 BILATERAL HIP PAIN: ICD-10-CM

## 2023-11-08 DIAGNOSIS — M25.551 BILATERAL HIP PAIN: ICD-10-CM

## 2023-11-08 DIAGNOSIS — G89.29 CHRONIC BILATERAL LOW BACK PAIN WITHOUT SCIATICA: Primary | ICD-10-CM

## 2023-11-08 DIAGNOSIS — M54.50 CHRONIC BILATERAL LOW BACK PAIN WITHOUT SCIATICA: Primary | ICD-10-CM

## 2023-11-08 PROCEDURE — 99214 OFFICE O/P EST MOD 30 MIN: CPT | Performed by: NURSE PRACTITIONER

## 2023-11-08 RX ORDER — PREDNISONE 20 MG/1
TABLET ORAL
Qty: 10 TABLET | Refills: 0 | Status: SHIPPED | OUTPATIENT
Start: 2023-11-08

## 2023-11-08 RX ORDER — PREDNISONE 20 MG/1
20 TABLET ORAL 2 TIMES DAILY
Qty: 10 TABLET | Refills: 0 | Status: SHIPPED | OUTPATIENT
Start: 2023-11-08 | End: 2023-11-08 | Stop reason: SDUPTHER

## 2023-11-08 NOTE — PROGRESS NOTES
"Chief Complaint  Leg Pain (States right side and low back) and Back Pain (Right low back / hip states numb pain)    Subjective         Maureen Courtney presents to [facility]  History of Present Illness  Objective   She has had the back issues for a while but it started getting worse on Monday--she has been alternating from Tylenol and IBU and she did take 2 IBU 800mg at the same time.  She is having pain in the hips and can't sleep at times.  No bowel or bladder leaking noted.  No xray recently.  No injury recently.  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  She has been to pain mgt but it has been over 4-5 years ago.  She is requesting pain meds today    Vital Signs:   There were no vitals taken for this visit.    Estimated body mass index is 34.18 kg/m² as calculated from the following:    Height as of 8/2/23: 165.1 cm (65\").    Weight as of 8/2/23: 93.2 kg (205 lb 6.4 oz).     Physical Exam   Constitutional: She appears well-developed and well-nourished.   HENT:   Head: Normocephalic.   Pulmonary/Chest: Effort normal.  No respiratory distress.  Neurological: She is alert. No cranial nerve deficit.   Skin: No bruising and no rash noted.   Psychiatric: She has a normal mood and affect. Her speech is normal and behavior is normal. Thought content normal.     Result Review :   The following data was reviewed by: LEONEL River on 11/08/2023:  CMP          2/2/2023    07:47 8/2/2023    09:01   CMP   Glucose 103  100    BUN 16  10    Creatinine 0.57  0.50    EGFR 110.2  113.7    Sodium 142  141    Potassium 4.4  4.1    Chloride 102  104    Calcium 9.9  9.8    Total Protein 7.7  7.6    Albumin 4.8  4.8    Globulin 2.9  2.8    Total Bilirubin 0.4  0.3    Alkaline Phosphatase 75  72    AST (SGOT) 16  16    ALT (SGPT) 18  20    Albumin/Globulin Ratio 1.7  1.7    BUN/Creatinine Ratio 28.1  20.0    Anion Gap 11.5  8.0      CBC  "         2/2/2023    07:47 8/2/2023    09:01   CBC   WBC 7.06  7.27    RBC 5.60  4.91    Hemoglobin 16.6  15.1    Hematocrit 49.1  44.8    MCV 87.7  91.2    MCH 29.6  30.8    MCHC 33.8  33.7    RDW 13.5  13.5    Platelets 144  172                Assessment and Plan    Diagnoses and all orders for this visit:    1. Chronic bilateral low back pain without sciatica (Primary)  -     XR Sacroiliac Joints 3+ View; Future  -     XR Spine Lumbar 4+ View; Future  -     XR Hips Bilateral With or Without Pelvis 2 View; Future  -     Discontinue: predniSONE (DELTASONE) 20 MG tablet; Take 1 tablet by mouth 2 (Two) Times a Day for 5 days.  Dispense: 10 tablet; Refill: 0  -     predniSONE (DELTASONE) 20 MG tablet; 1 tab bid for 5 days then 1 tab daily for 5 days  Dispense: 10 tablet; Refill: 0    2. Bilateral hip pain  -     XR Hips Bilateral With or Without Pelvis 2 View; Future  -     Discontinue: predniSONE (DELTASONE) 20 MG tablet; Take 1 tablet by mouth 2 (Two) Times a Day for 5 days.  Dispense: 10 tablet; Refill: 0  -     predniSONE (DELTASONE) 20 MG tablet; 1 tab bid for 5 days then 1 tab daily for 5 days  Dispense: 10 tablet; Refill: 0    3. Numbness  -     XR Hips Bilateral With or Without Pelvis 2 View; Future  -     Discontinue: predniSONE (DELTASONE) 20 MG tablet; Take 1 tablet by mouth 2 (Two) Times a Day for 5 days.  Dispense: 10 tablet; Refill: 0  -     predniSONE (DELTASONE) 20 MG tablet; 1 tab bid for 5 days then 1 tab daily for 5 days  Dispense: 10 tablet; Refill: 0        Follow Up   Return if symptoms worsen or fail to improve.  Patient was given instructions and counseling regarding her condition or for health maintenance advice. Please see specific information pulled into the AVS if appropriate.   Discussed that we will do low dose steroid and xrays.  Discussed that I would like ot hold off on pain med right now.  We will refer to pain mgt if needed.  Cont with the tylenol and iBU and we will go from there.  I  need to see what the xrays look like and then poss do tramadol.  I did mention about doing the CBD oils/gummies for relief as she thought about doing Marijuana for relief.    Mode of Visit: Video  Location of patient: home  Location of provider: other: [facility]  You have chosen to receive care through a telehealth visit.  Does the patient consent to use a video/audio connection for your medical care today? Yes  The visit included audio and video interaction. No technical issues occurred during this visit.   Lisseth Viveros, APRN  11/08/2023

## 2023-11-08 NOTE — LETTER
November 8, 2023     Patient: Maureen Courtney   YOB: 1970   Date of Visit: 11/8/2023       To Whom It May Concern:    It is my medical opinion that Maureen Courtney may return to work in one day.            Sincerely,        LEONEL River    CC: No Recipients

## 2023-11-17 ENCOUNTER — PATIENT ROUNDING (BHMG ONLY) (OUTPATIENT)
Dept: FAMILY MEDICINE CLINIC | Facility: CLINIC | Age: 53
End: 2023-11-17
Payer: COMMERCIAL

## 2023-11-17 NOTE — PROGRESS NOTES
A My-Chart message has been sent to the patient [place] [facility] [place]

## 2023-11-27 ENCOUNTER — OFFICE VISIT (OUTPATIENT)
Dept: FAMILY MEDICINE CLINIC | Facility: CLINIC | Age: 53
End: 2023-11-27
Payer: COMMERCIAL

## 2023-11-27 VITALS
SYSTOLIC BLOOD PRESSURE: 170 MMHG | OXYGEN SATURATION: 95 % | DIASTOLIC BLOOD PRESSURE: 80 MMHG | BODY MASS INDEX: 34.49 KG/M2 | TEMPERATURE: 96.8 F | HEIGHT: 65 IN | WEIGHT: 207 LBS | HEART RATE: 81 BPM | RESPIRATION RATE: 18 BRPM

## 2023-11-27 DIAGNOSIS — G43.919 INTRACTABLE MIGRAINE WITHOUT STATUS MIGRAINOSUS, UNSPECIFIED MIGRAINE TYPE: Primary | ICD-10-CM

## 2023-11-27 DIAGNOSIS — I10 PRIMARY HYPERTENSION: ICD-10-CM

## 2023-11-27 PROCEDURE — 99213 OFFICE O/P EST LOW 20 MIN: CPT | Performed by: NURSE PRACTITIONER

## 2023-11-27 NOTE — PROGRESS NOTES
Chief Complaint  FMLA paperwork    Subjective          Maureen Courtney presents to [facility]  History of Present Illness  Patient is here for FMLA paperwork.  She is needing it for her migraines.  Her blood pressure is elevated today but she has been stressed.  She still has about 9 weeks left on her current FMLA and we need to redo the paperwork.  She said the only differences when she has a migraine she needs about 2 to 3 days versus 1 to 2 days for recuperation.  She will take her Nurtec and her Imitrex when she has the headaches.  Her last headache was 11/25/2023 and it lasted all day and she did take a Nurtec and it did ease the pain.    Depression: Not at risk (2/2/2023)    PHQ-2     PHQ-2 Score: 0    and 2/2/2023               Allergies  Beef allergy, Eggs or egg-derived products, Influenza virus vaccine, Pork allergy, Shellfish-derived products, Tetanus toxoid, Cephalexin, Coffee, Dust mite extract, Egg phospholipids, Erythromycin, Mixed grasses, Molds & smuts, Msg [monosodium glutamate], Pineapple, and Prednisone    Social History     Tobacco Use    Smoking status: Every Day     Packs/day: 0.50     Years: 40.00     Additional pack years: 0.00     Total pack years: 20.00     Types: Cigarettes     Start date: 1982    Smokeless tobacco: Never   Vaping Use    Vaping Use: Some days    Substances: Flavoring    Devices: Pre-filled or refillable cartridge   Substance Use Topics    Alcohol use: Not Currently     Comment: Occasionally    Drug use: Never       Family History   Problem Relation Age of Onset    Hypertension Mother     Thyroid disease Mother     Hyperlipidemia Mother     Osteoarthritis Mother     Heart disease Mother     Diabetes Father     Stroke Father     Skin cancer Father     Heart failure Sister 38        Downs Syndrome    Heart disease Maternal Grandfather     Cancer Other     Asthma Other     Heart disease Other     Hyperlipidemia Other     Heart failure Other      "Hypertension Other     Diabetes Other         Unspecified        Health Maintenance Due   Topic Date Due    Hepatitis B (1 of 3 - 3-dose series) Never done    ANNUAL PHYSICAL  Never done        Immunization History   Administered Date(s) Administered    Fluzone (or Fluarix & Flulaval for VFC) >6mos 10/21/2014    Influenza TIV (IM) 10/21/2014    Influenza, Unspecified 10/21/2014       Review of Systems   Constitutional:  Negative for fatigue.   Respiratory:  Negative for cough and shortness of breath.    Cardiovascular:  Negative for chest pain.   Gastrointestinal:  Negative for diarrhea, nausea and vomiting.   Neurological:  Positive for headache.        Objective       Vitals:    11/27/23 1504 11/27/23 1509   BP: 161/69 170/80   Pulse: 81    Resp: 18    Temp: 96.8 °F (36 °C)    SpO2: 95%    Weight: 93.9 kg (207 lb)    Height: 165.1 cm (65\")        Body mass index is 34.45 kg/m².         Physical Exam  Constitutional:       Appearance: Normal appearance.   HENT:      Head: Normocephalic.   Pulmonary:      Effort: Pulmonary effort is normal.   Skin:     Findings: No bruising.   Neurological:      General: No focal deficit present.      Mental Status: She is alert and oriented to person, place, and time.   Psychiatric:         Mood and Affect: Mood normal.         Behavior: Behavior normal.         Thought Content: Thought content normal.         Judgment: Judgment normal.             Result Review :     The following data was reviewed by: LEONEL River on 11/27/2023:                     Assessment and Plan      Diagnoses and all orders for this visit:    1. Intractable migraine without status migrainosus, unspecified migraine type (Primary)    2. Primary hypertension            Follow Up     Return if symptoms worsen or fail to improve.  I did the [facility] paperwork for her.  She does have a copy at the time of discharge.  We will fax the original to the number on the form.  She will follow-up as needed for " her migraines.  She will follow-up with me with routine in February for checkup labs and refills.  If the headaches become worse or she needs more medications she will follow-up and we may even need to do a referral to neurology.  Patient was given instructions and counseling regarding her condition or for health maintenance advice. Please see specific information pulled into the AVS if appropriate.     Parts of this note are electronic transcriptions/translations of spoken language to printed text using the Dragon Dictation system.          Lisseth Viveros, APRN  11/27/2023

## 2024-02-06 ENCOUNTER — HOSPITAL ENCOUNTER (OUTPATIENT)
Dept: GENERAL RADIOLOGY | Facility: HOSPITAL | Age: 54
Discharge: HOME | End: 2024-02-06
Payer: COMMERCIAL

## 2024-02-06 ENCOUNTER — OFFICE VISIT (OUTPATIENT)
Dept: FAMILY MEDICINE CLINIC | Facility: CLINIC | Age: 54
End: 2024-02-06
Payer: COMMERCIAL

## 2024-02-06 VITALS
TEMPERATURE: 98.1 F | RESPIRATION RATE: 16 BRPM | SYSTOLIC BLOOD PRESSURE: 150 MMHG | OXYGEN SATURATION: 94 % | HEIGHT: 65 IN | HEART RATE: 67 BPM | WEIGHT: 202.9 LBS | DIASTOLIC BLOOD PRESSURE: 80 MMHG | BODY MASS INDEX: 33.8 KG/M2

## 2024-02-06 DIAGNOSIS — Z12.11 SCREENING FOR COLON CANCER: ICD-10-CM

## 2024-02-06 DIAGNOSIS — G89.29 CHRONIC BILATERAL LOW BACK PAIN WITHOUT SCIATICA: ICD-10-CM

## 2024-02-06 DIAGNOSIS — I10 PRIMARY HYPERTENSION: ICD-10-CM

## 2024-02-06 DIAGNOSIS — M54.50 CHRONIC BILATERAL LOW BACK PAIN WITHOUT SCIATICA: ICD-10-CM

## 2024-02-06 DIAGNOSIS — J45.20 MILD INTERMITTENT ASTHMA WITHOUT COMPLICATION: ICD-10-CM

## 2024-02-06 DIAGNOSIS — Z51.81 ENCOUNTER FOR MEDICATION MONITORING: ICD-10-CM

## 2024-02-06 DIAGNOSIS — R73.09 ELEVATED GLUCOSE: ICD-10-CM

## 2024-02-06 DIAGNOSIS — R20.0 NUMBNESS: ICD-10-CM

## 2024-02-06 DIAGNOSIS — Z00.00 ANNUAL PHYSICAL EXAM: Primary | ICD-10-CM

## 2024-02-06 DIAGNOSIS — G43.919 INTRACTABLE MIGRAINE WITHOUT STATUS MIGRAINOSUS, UNSPECIFIED MIGRAINE TYPE: ICD-10-CM

## 2024-02-06 DIAGNOSIS — M25.552 BILATERAL HIP PAIN: ICD-10-CM

## 2024-02-06 DIAGNOSIS — R00.2 PALPITATION: ICD-10-CM

## 2024-02-06 DIAGNOSIS — Z12.31 SCREENING MAMMOGRAM FOR BREAST CANCER: ICD-10-CM

## 2024-02-06 DIAGNOSIS — F17.210 CIGARETTE NICOTINE DEPENDENCE WITHOUT COMPLICATION: ICD-10-CM

## 2024-02-06 DIAGNOSIS — Z12.2 ENCOUNTER FOR SCREENING FOR LUNG CANCER: ICD-10-CM

## 2024-02-06 DIAGNOSIS — M25.551 BILATERAL HIP PAIN: ICD-10-CM

## 2024-02-06 LAB
ALBUMIN SERPL-MCNC: 4.6 G/DL (ref 3.5–5.2)
ALBUMIN/GLOB SERPL: 1.6 G/DL
ALP SERPL-CCNC: 70 U/L (ref 39–117)
ALT SERPL W P-5'-P-CCNC: 14 U/L (ref 1–33)
AMPHET+METHAMPHET UR QL: NEGATIVE
AMPHETAMINE INTERNAL CONTROL: NORMAL
AMPHETAMINES UR QL: NEGATIVE
ANION GAP SERPL CALCULATED.3IONS-SCNC: 12.5 MMOL/L (ref 5–15)
AST SERPL-CCNC: 21 U/L (ref 1–32)
BARBITURATE INTERNAL CONTROL: NORMAL
BARBITURATES UR QL SCN: NEGATIVE
BASOPHILS # BLD AUTO: 0.05 10*3/MM3 (ref 0–0.2)
BASOPHILS NFR BLD AUTO: 0.7 % (ref 0–1.5)
BENZODIAZ UR QL SCN: NEGATIVE
BENZODIAZEPINE INTERNAL CONTROL: NORMAL
BILIRUB SERPL-MCNC: 0.3 MG/DL (ref 0–1.2)
BUN SERPL-MCNC: 12 MG/DL (ref 6–20)
BUN/CREAT SERPL: 17.4 (ref 7–25)
BUPRENORPHINE INTERNAL CONTROL: NORMAL
BUPRENORPHINE SERPL-MCNC: NEGATIVE NG/ML
CALCIUM SPEC-SCNC: 9.6 MG/DL (ref 8.6–10.5)
CANNABINOIDS SERPL QL: NEGATIVE
CHLORIDE SERPL-SCNC: 102 MMOL/L (ref 98–107)
CHOLEST SERPL-MCNC: 128 MG/DL (ref 0–200)
CO2 SERPL-SCNC: 25.5 MMOL/L (ref 22–29)
COCAINE INTERNAL CONTROL: NORMAL
COCAINE UR QL: NEGATIVE
CREAT SERPL-MCNC: 0.69 MG/DL (ref 0.57–1)
DEPRECATED RDW RBC AUTO: 40.4 FL (ref 37–54)
EGFRCR SERPLBLD CKD-EPI 2021: 104.6 ML/MIN/1.73
EOSINOPHIL # BLD AUTO: 0.08 10*3/MM3 (ref 0–0.4)
EOSINOPHIL NFR BLD AUTO: 1.1 % (ref 0.3–6.2)
ERYTHROCYTE [DISTWIDTH] IN BLOOD BY AUTOMATED COUNT: 12.8 % (ref 12.3–15.4)
EXPIRATION DATE: NORMAL
GLOBULIN UR ELPH-MCNC: 2.9 GM/DL
GLUCOSE SERPL-MCNC: 94 MG/DL (ref 65–99)
HBA1C MFR BLD: 5.5 % (ref 4.8–5.6)
HCT VFR BLD AUTO: 48.6 % (ref 34–46.6)
HDLC SERPL QL: 2.67
HDLC SERPL-MCNC: 48 MG/DL (ref 40–60)
HGB BLD-MCNC: 16.1 G/DL (ref 12–15.9)
LDLC SERPL CALC-MCNC: 62 MG/DL (ref 0–100)
LYMPHOCYTES # BLD AUTO: 2.24 10*3/MM3 (ref 0.7–3.1)
LYMPHOCYTES NFR BLD AUTO: 30.7 % (ref 19.6–45.3)
Lab: NORMAL
MCH RBC QN AUTO: 29 PG (ref 26.6–33)
MCHC RBC AUTO-ENTMCNC: 33.1 G/DL (ref 31.5–35.7)
MCV RBC AUTO: 87.4 FL (ref 79–97)
MDMA (ECSTASY) INTERNAL CONTROL: NORMAL
MDMA UR QL SCN: NEGATIVE
METHADONE INTERNAL CONTROL: NORMAL
METHADONE UR QL SCN: NEGATIVE
METHAMPHETAMINE INTERNAL CONTROL: NORMAL
MONOCYTES # BLD AUTO: 0.43 10*3/MM3 (ref 0.1–0.9)
MONOCYTES NFR BLD AUTO: 5.9 % (ref 5–12)
MORPHINE INTERNAL CONTROL: NORMAL
MORPHINE/OPIATES SCREEN, URINE: NEGATIVE
NEUTROPHILS NFR BLD AUTO: 4.48 10*3/MM3 (ref 1.7–7)
NEUTROPHILS NFR BLD AUTO: 61.3 % (ref 42.7–76)
OXYCODONE INTERNAL CONTROL: NORMAL
OXYCODONE UR QL SCN: NEGATIVE
PCP UR QL SCN: NEGATIVE
PHENCYCLIDINE INTERNAL CONTROL: NORMAL
PLATELET # BLD AUTO: 176 10*3/MM3 (ref 140–450)
PMV BLD AUTO: 13.2 FL (ref 6–12)
POTASSIUM SERPL-SCNC: 4.3 MMOL/L (ref 3.5–5.2)
PROT SERPL-MCNC: 7.5 G/DL (ref 6–8.5)
RBC # BLD AUTO: 5.56 10*6/MM3 (ref 3.77–5.28)
SODIUM SERPL-SCNC: 140 MMOL/L (ref 136–145)
THC INTERNAL CONTROL: NORMAL
TRIGL SERPL-MCNC: 97 MG/DL (ref 0–150)
TSH SERPL DL<=0.05 MIU/L-ACNC: 1.02 UIU/ML (ref 0.27–4.2)
VLDLC SERPL-MCNC: 18 MG/DL (ref 5–40)
WBC NRBC COR # BLD AUTO: 7.3 10*3/MM3 (ref 3.4–10.8)

## 2024-02-06 PROCEDURE — 80061 LIPID PANEL: CPT | Performed by: NURSE PRACTITIONER

## 2024-02-06 PROCEDURE — 72202 X-RAY EXAM SI JOINTS 3/> VWS: CPT

## 2024-02-06 PROCEDURE — 83036 HEMOGLOBIN GLYCOSYLATED A1C: CPT | Performed by: NURSE PRACTITIONER

## 2024-02-06 PROCEDURE — 80050 GENERAL HEALTH PANEL: CPT | Performed by: NURSE PRACTITIONER

## 2024-02-06 PROCEDURE — 72110 X-RAY EXAM L-2 SPINE 4/>VWS: CPT

## 2024-02-06 PROCEDURE — 73521 X-RAY EXAM HIPS BI 2 VIEWS: CPT

## 2024-02-06 RX ORDER — GABAPENTIN 100 MG/1
100 CAPSULE ORAL NIGHTLY
Qty: 90 CAPSULE | Refills: 0 | Status: CANCELLED | OUTPATIENT
Start: 2024-02-06

## 2024-02-06 RX ORDER — LOSARTAN POTASSIUM 25 MG/1
25 TABLET ORAL DAILY
Qty: 30 TABLET | Refills: 5 | Status: SHIPPED | OUTPATIENT
Start: 2024-02-06

## 2024-02-06 RX ORDER — GABAPENTIN 100 MG/1
100 CAPSULE ORAL NIGHTLY
Qty: 90 CAPSULE | Refills: 0 | Status: SHIPPED | OUTPATIENT
Start: 2024-02-06

## 2024-02-06 RX ORDER — CYCLOBENZAPRINE HCL 10 MG
10 TABLET ORAL NIGHTLY
Qty: 30 TABLET | Refills: 5 | Status: SHIPPED | OUTPATIENT
Start: 2024-02-06

## 2024-02-06 RX ORDER — MONTELUKAST SODIUM 10 MG/1
10 TABLET ORAL NIGHTLY
Qty: 30 TABLET | Refills: 5 | Status: SHIPPED | OUTPATIENT
Start: 2024-02-06

## 2024-02-06 RX ORDER — ALBUTEROL SULFATE 90 UG/1
2 AEROSOL, METERED RESPIRATORY (INHALATION) EVERY 4 HOURS PRN
Qty: 8 G | Refills: 5 | Status: SHIPPED | OUTPATIENT
Start: 2024-02-06

## 2024-02-06 RX ORDER — RIMEGEPANT SULFATE 75 MG/75MG
75 TABLET, ORALLY DISINTEGRATING ORAL DAILY PRN
Qty: 30 TABLET | Refills: 1 | Status: SHIPPED | OUTPATIENT
Start: 2024-02-06

## 2024-02-06 RX ORDER — SUMATRIPTAN 50 MG/1
TABLET, FILM COATED ORAL
Qty: 27 TABLET | Refills: 1 | Status: SHIPPED | OUTPATIENT
Start: 2024-02-06

## 2024-02-06 RX ORDER — HYDROCHLOROTHIAZIDE 25 MG/1
25 TABLET ORAL DAILY
Qty: 30 TABLET | Refills: 5 | Status: SHIPPED | OUTPATIENT
Start: 2024-02-06

## 2024-02-06 RX ORDER — METOPROLOL SUCCINATE 100 MG/1
100 TABLET, EXTENDED RELEASE ORAL DAILY
Qty: 30 TABLET | Refills: 5 | Status: SHIPPED | OUTPATIENT
Start: 2024-02-06

## 2024-02-06 RX ORDER — IBUPROFEN 800 MG/1
800 TABLET ORAL EVERY 8 HOURS PRN
Qty: 90 TABLET | Refills: 5 | Status: SHIPPED | OUTPATIENT
Start: 2024-02-06

## 2024-02-06 NOTE — PROGRESS NOTES
Chief Complaint  Hypertension, Diabetes, and Migraine    Subjective          Maureen Courtney presents to [facility]  History of Present Illness  Here for check up:  Dentist: dentures  Eye: hasn't been forever  Mammo: needs  Pap: She is due a pelvic exam has not had one recently.  She did have a hysterectomy back in 2013  Family hx: No new changes  Colonoscopy: Needs as she has not had 1.  She is willing to do the Cologuard  H/A: She is doing ok with her current med.  She is not having to take more meds.  HTN:  She did take her meds this AM.   back pain:  She is taking her flexeril for the back.  She is still having increased back pain.  She has not done her x-rays.  She did utilize THC about 5 days ago.  She is requesting something for chronic pain but she does not want an actual hydrocodone-based.  She has the irritability going down the left leg.  ASTHMA:  She is having to use the inhaler more since even getting covid.  She is breathing better overall.   Migraine: She is doing well with her Imitrex and Nurtec.  She feels good overall.  She is working 7 days a week 12-hour days and even longer at times.  She goes them at 2 in the morning most of the time  Glucose:  She was on metformin.  We will check her A1c today    Depression: Not at risk (2/6/2024)    PHQ-2     PHQ-2 Score: 0    and 2/6/2024               Allergies  Beef allergy, Eggs or egg-derived products, Influenza virus vaccine, Pork allergy, Shellfish-derived products, Tetanus toxoid, Cephalexin, Coffee, Dust mite extract, Egg phospholipids, Erythromycin, Mixed grasses, Molds & smuts, Msg [monosodium glutamate], Pineapple, and Prednisone    Social History     Tobacco Use    Smoking status: Every Day     Packs/day: 0.50     Years: 40.00     Additional pack years: 0.00     Total pack years: 20.00     Types: Cigarettes     Start date: 1982    Smokeless tobacco: Never   Vaping Use    Vaping Use: Some days    Substances:  "Flavoring    Devices: Pre-filled or refillable cartridge   Substance Use Topics    Alcohol use: Not Currently     Comment: Occasionally    Drug use: Never       Family History   Problem Relation Age of Onset    Hypertension Mother     Thyroid disease Mother     Hyperlipidemia Mother     Osteoarthritis Mother     Heart disease Mother     Diabetes Father     Stroke Father     Skin cancer Father     Heart failure Sister 38        Downs Syndrome    Heart disease Maternal Grandfather     Cancer Other     Asthma Other     Heart disease Other     Hyperlipidemia Other     Heart failure Other     Hypertension Other     Diabetes Other         Unspecified        Health Maintenance Due   Topic Date Due    Hepatitis B (1 of 3 - 3-dose series) Never done    Pneumococcal Vaccine 0-64 (1 of 2 - PCV) Never done    LUNG CANCER SCREENING  Never done    MAMMOGRAM  01/21/2022    HEMOGLOBIN A1C  02/02/2024        Immunization History   Administered Date(s) Administered    Fluzone (or Fluarix & Flulaval for VFC) >6mos 10/21/2014    Influenza TIV (IM) 10/21/2014    Influenza, Unspecified 10/21/2014       Review of Systems   Constitutional:  Positive for fatigue.   Respiratory:  Negative for cough and shortness of breath.    Cardiovascular:  Negative for chest pain.   Gastrointestinal:  Negative for diarrhea, nausea and vomiting.   Musculoskeletal:  Positive for arthralgias, back pain, gait problem and myalgias.        Objective       Vitals:    02/06/24 0823 02/06/24 0830   BP: 180/84 150/80   Pulse: 67    Resp: 16    Temp: 98.1 °F (36.7 °C)    SpO2: 94%    Weight: 92 kg (202 lb 14.4 oz)    Height: 165.1 cm (65\")        Body mass index is 33.76 kg/m².         Physical Exam  Vitals reviewed.   Constitutional:       Appearance: Normal appearance. She is well-developed.   HENT:      Right Ear: Tympanic membrane and ear canal normal.      Left Ear: Tympanic membrane and ear canal normal.      Nose: Rhinorrhea present. No congestion.      " Mouth/Throat:      Pharynx: Posterior oropharyngeal erythema present. No oropharyngeal exudate.   Eyes:      Conjunctiva/sclera: Conjunctivae normal.   Cardiovascular:      Rate and Rhythm: Normal rate and regular rhythm.      Heart sounds: Normal heart sounds. No murmur heard.  Pulmonary:      Effort: Pulmonary effort is normal.      Breath sounds: Wheezing present. No rhonchi.   Neurological:      Mental Status: She is alert and oriented to person, place, and time.      Cranial Nerves: No cranial nerve deficit.      Motor: No weakness.   Psychiatric:         Mood and Affect: Mood and affect normal.             Result Review :     The following data was reviewed by: LEONEL River on 02/06/2024:    Common Labs   Common labs          8/2/2023    09:01   Common Labs   Glucose 100    BUN 10    Creatinine 0.50    Sodium 141    Potassium 4.1    Chloride 104    Calcium 9.8    Albumin 4.8    Total Bilirubin 0.3    Alkaline Phosphatase 72    AST (SGOT) 16    ALT (SGPT) 20    WBC 7.27    Hemoglobin 15.1    Hematocrit 44.8    Platelets 172    Total Cholesterol 114    Triglycerides 69    HDL Cholesterol 52    LDL Cholesterol  47    Hemoglobin A1C 5.60    Microalbumin, Urine <1.2      Pain Management Panel          Latest Ref Rng & Units 2/6/2024   Pain Management Panel   Amphetamine, Urine Qual Negative Negative    Barbiturates Screen, Urine Negative Negative    Benzodiazepine Screen, Urine Negative Negative    Buprenorphine, Screen, Urine Negative Negative    Cocaine Screen, Urine Negative Negative    Methadone Screen , Urine Negative Negative    Methamphetamine, Ur Negative Negative                      Assessment and Plan      Diagnoses and all orders for this visit:    1. Annual physical exam (Primary)    2. Encounter for screening for lung cancer  -      CT Chest Low Dose Cancer Screening WO; Future    3. Intractable migraine without status migrainosus, unspecified migraine type  -     SUMAtriptan (Imitrex)  50 MG tablet; Take one tablet at onset of headache. May repeat dose one time in 2 hours if headache not relieved.  Dispense: 27 tablet; Refill: 1  -     Rimegepant Sulfate (Nurtec) 75 MG tablet dispersible tablet; Take 1 tablet by mouth Daily As Needed (headache).  Dispense: 30 tablet; Refill: 1    4. Mild intermittent asthma without complication  -     montelukast (SINGULAIR) 10 MG tablet; Take 1 tablet by mouth Every Night.  Dispense: 30 tablet; Refill: 5  -     albuterol sulfate  (90 Base) MCG/ACT inhaler; Inhale 2 puffs Every 4 (Four) Hours As Needed for Wheezing.  Dispense: 8 g; Refill: 5    5. Primary hypertension  -     CBC Auto Differential  -     Comprehensive Metabolic Panel  -     Lipid Panel With / Chol / HDL Ratio  -     TSH  -     metoprolol succinate XL (TOPROL-XL) 100 MG 24 hr tablet; Take 1 tablet by mouth Daily.  Dispense: 30 tablet; Refill: 5  -     losartan (COZAAR) 25 MG tablet; Take 1 tablet by mouth Daily.  Dispense: 30 tablet; Refill: 5  -     hydroCHLOROthiazide (HYDRODIURIL) 25 MG tablet; Take 1 tablet by mouth Daily.  Dispense: 30 tablet; Refill: 5    6. Chronic bilateral low back pain without sciatica  -     ibuprofen (ADVIL,MOTRIN) 800 MG tablet; Take 1 tablet by mouth Every 8 (Eight) Hours As Needed for Mild Pain.  Dispense: 90 tablet; Refill: 5  -     cyclobenzaprine (FLEXERIL) 10 MG tablet; Take 1 tablet by mouth Every Night. for muscle spams  Dispense: 30 tablet; Refill: 5  -     POC 12 Panel Urine Drug Screen  -     gabapentin (Neurontin) 100 MG capsule; Take 1 capsule by mouth Every Night.  Dispense: 90 capsule; Refill: 0    7. Palpitation  -     Holter Monitor - 72 Hour Up To 15 Days; Future    8. Screening mammogram for breast cancer  -     Mammo Screening Digital Tomosynthesis Bilateral With CAD; Future    9. Screening for colon cancer  -     Cologuard - Stool, Per Rectum; Future    10. Elevated glucose  -     Hemoglobin A1c    11. Cigarette nicotine dependence without  complication    12. Encounter for medication monitoring  -     POC 12 Panel Urine Drug Screen            Follow Up     Return in about 3 months (around 5/6/2024) for neurontin refills.  ADVICE WAS GIVEN RE:  ALCOHOL USE, SEATBELT USE, REGULAR EXERCISE, HEALTHY DIET, ROUTINE EYE AND DENTAL EXAMS  We will go ahead start gabapentin.  The urine drug screening is normal.  She is aware she needs to continue to slow down on smoking.  She is aware that we will do the refill of gabapentin she will follow-up every 3 months.  Call with questions or concerns.  Delmer done.  Consent done.  Urine drug screen appropriate.  She is aware that she cannot be smoking marijuana while on this medicine.  Patient was given instructions and counseling regarding her condition or for health maintenance advice. Please see specific information pulled into the AVS if appropriate.     Parts of this note are electronic transcriptions/translations of spoken language to printed text using the Dragon Dictation system.          Lisseth Viveros, LEONEL  02/06/2024

## 2024-02-07 DIAGNOSIS — M54.50 CHRONIC BILATERAL LOW BACK PAIN WITHOUT SCIATICA: ICD-10-CM

## 2024-02-07 DIAGNOSIS — M25.552 BILATERAL HIP PAIN: Primary | ICD-10-CM

## 2024-02-07 DIAGNOSIS — G89.29 CHRONIC BILATERAL LOW BACK PAIN WITHOUT SCIATICA: ICD-10-CM

## 2024-02-07 DIAGNOSIS — M25.551 BILATERAL HIP PAIN: Primary | ICD-10-CM

## 2024-02-15 ENCOUNTER — OFFICE VISIT (OUTPATIENT)
Dept: ORTHOPEDIC SURGERY | Facility: CLINIC | Age: 54
End: 2024-02-15
Payer: COMMERCIAL

## 2024-02-15 VITALS — HEART RATE: 79 BPM | HEIGHT: 65 IN | BODY MASS INDEX: 33.66 KG/M2 | OXYGEN SATURATION: 94 % | WEIGHT: 202 LBS

## 2024-02-15 DIAGNOSIS — M16.0 OSTEOARTHRITIS OF BOTH HIPS, UNSPECIFIED OSTEOARTHRITIS TYPE: Primary | ICD-10-CM

## 2024-02-15 DIAGNOSIS — M47.818 SI JOINT ARTHRITIS: ICD-10-CM

## 2024-02-15 DIAGNOSIS — M54.16 LUMBAR RADICULOPATHY: ICD-10-CM

## 2024-02-16 ENCOUNTER — PATIENT ROUNDING (BHMG ONLY) (OUTPATIENT)
Dept: FAMILY MEDICINE CLINIC | Facility: CLINIC | Age: 54
End: 2024-02-16
Payer: COMMERCIAL

## 2024-03-27 ENCOUNTER — PATIENT ROUNDING (BHMG ONLY) (OUTPATIENT)
Dept: URGENT CARE | Facility: CLINIC | Age: 54
End: 2024-03-27
Payer: COMMERCIAL

## 2024-03-27 NOTE — ED NOTES
Thank you for letting us care for you in your recent visit to our urgent care center. We would love to hear about your experience with us. Was this the first time you have visited our location?    We’re always looking for ways to make our patients’ experiences even better. Do you have any recommendations on ways we may improve?     I appreciate you taking the time to respond. Please be on the lookout for a survey about your recent visit from TMS via text or email. We would greatly appreciate if you could fill that out and turn it back in. We want your voice to be heard and we value your feedback.   Thank you for choosing [facility] for your healthcare needs.

## 2024-04-12 ENCOUNTER — HOSPITAL ENCOUNTER (OUTPATIENT)
Dept: CT IMAGING | Facility: HOSPITAL | Age: 54
Discharge: HOME | End: 2024-04-12
Payer: COMMERCIAL

## 2024-07-02 ENCOUNTER — TRANSCRIBE ORDERS (OUTPATIENT)
Dept: PHYSICAL THERAPY | Facility: CLINIC | Age: 54
End: 2024-07-02
Payer: COMMERCIAL

## 2024-07-02 DIAGNOSIS — S60.031A CONTUSION OF RIGHT MIDDLE FINGER WITHOUT DAMAGE TO NAIL, INITIAL ENCOUNTER: Primary | ICD-10-CM

## 2024-07-02 DIAGNOSIS — S63.692A OTHER SPRAIN OF RIGHT MIDDLE FINGER, INITIAL ENCOUNTER: ICD-10-CM

## 2024-07-19 ENCOUNTER — TREATMENT (OUTPATIENT)
Dept: PHYSICAL THERAPY | Facility: CLINIC | Age: 54
End: 2024-07-19
Payer: OTHER MISCELLANEOUS

## 2024-07-19 DIAGNOSIS — S63.692A OTHER SPRAIN OF RIGHT MIDDLE FINGER, INITIAL ENCOUNTER: ICD-10-CM

## 2024-07-19 DIAGNOSIS — S60.031D CONTUSION OF RIGHT MIDDLE FINGER WITHOUT DAMAGE TO NAIL, SUBSEQUENT ENCOUNTER: Primary | ICD-10-CM

## 2024-07-19 PROCEDURE — 97530 THERAPEUTIC ACTIVITIES: CPT | Performed by: OCCUPATIONAL THERAPIST

## 2024-07-19 PROCEDURE — 97166 OT EVAL MOD COMPLEX 45 MIN: CPT | Performed by: OCCUPATIONAL THERAPIST

## 2024-07-19 PROCEDURE — 97110 THERAPEUTIC EXERCISES: CPT | Performed by: OCCUPATIONAL THERAPIST

## 2024-07-22 NOTE — PROGRESS NOTES
Outpatient Occupational Therapy Ortho Initial Evaluation  [address] [place] [address]    Patient: Maureen Courtney   : 1970  Referring practitioner: Marcelino Jimenez*  Date of Initial Visit: 2024  Today's Date: 2024  Patient seen for 1 sessions  Diagnosis/ICD-10 Code:      ICD-10-CM ICD-9-CM   1. Contusion of right middle finger without damage to nail, subsequent encounter  S60.031D V58.89     923.3   2. Other sprain of right middle finger, initial encounter  S63.692A 842.19                Subjective Questionnaire: QuickDASH: 41      Subjective Evaluation    History of Present Illness  Date of onset: 2024  Mechanism of injury: Works at [employer]  and had a part fall on R hand, causingher fingers to bend backwards on 24.  She was placed in a brace for the LF and referred to Dr. Redd.  She was then placed in thumb spica until 24.  OT orders for eval and treat for ROM and strengthening.       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 Quality of life: good    Pain  Current pain ratin  Location: middle finger  Quality: throbbing  Relieving factors: heat and ice  Aggravating factors: lifting, keyboarding and repetitive movement  Progression: improved    Social Support  Lives with: alone    Hand dominance: right    Patient Goals  Patient goals for therapy: decreased edema, return to work, return to sport/leisure activities, increased strength, increased motion and decreased pain           Past Medical hx: arthritis, diabetic    Objective          Neurological Testing     Sensation     Wrist/Hand     Right   Diminished: light touch    Comments   Right light touch: numbness and tingling along DRSB    Active Range of Motion     Right Wrist   Wrist flexion: 65 degrees   Wrist extension: 60 degrees   Radial deviation: 15 degrees   Ulnar deviation: 25 degrees     Additional Active Range of Motion Details  Pronation 90 degrees  Supination 70  degrees  Full fist  Thumb 0-40      0-50  ABD 40  Ext 30        Strength/Myotome Testing     Left Wrist/Hand      (2nd hand position)   Left  strength (2nd hand position) 70 lbs    Right Wrist/Hand      (2nd hand position)   Right  strength (2nd hand position) 18 lbs    Swelling     Right Wrist/Hand   Circumference MCP: 21.5 cm  Circumference wrist: 18.3 cm          Assessment & Plan       Assessment  Impairments: abnormal coordination, abnormal muscle firing, abnormal or restricted ROM, activity intolerance, impaired physical strength, lacks appropriate home exercise program, pain with function, safety issue and weight-bearing intolerance   Functional limitations: carrying objects, lifting, pulling, pushing, reaching behind back, reaching overhead and unable to perform repetitive tasks   Assessment details: Pt reports she is having stiffness and pain in R hand specifically in the R thumb as she was immobilized in bracing since 5/11/24-7/7/24 per patient report.  Pt reports she is having pain with movement, unable to lift and  parts at work.  Pt reports poor tolerance to writing, painting, and functional pinching.   Prognosis: good    Goals  Plan Goals: 1. The patient complains of pain in the R hand                   LTG 1: 12 weeks:  The patient will report a pain rating of 0/10 or better in order to improve sleep quality and tolerance to performance of activities of daily living.                                  STATUS:  New                  STG 1a: 4weeks:  The patient will report a pain rating of 5/10 or better.                                   STATUS:  New  2. The patient has limited ROM of the R thumb                  LTG 2: 12 weeks:  The patient will demonstrate 120 degrees of R thumb JOVEL to allow the patient to .                                  STATUS:  New                   STG 2a: 4 weeks:  The patient will demonstrate 100 degrees of R thumb JOVEL.                                   STATUS:  New                             3. The patient has limited strength of the R UE.                  LTG 3: 12 weeks:  The patient will demonstrate 60# in order to return to lifting at work.                                  STATUS:  New                  STG 3a: 4 weeks:  The patient will demonstrate tolerance to light strengthening without adverse reaction.                                  STATUS:  New  4. Carrying, Moving, and Handling Objects Functional Limitation                                    LTG 4: 12 weeks:  The patient will demonstrate 0% limitation by achieving a score of 11 on the Quick DASH.                                  STATUS:  New                  STG 4a: 4 weeks:  The patient will demonstrate 40-59% limitation by achieving a score of 30 on the Quick DASH.                                    STATUS:  New                    TREATMENT: Orthotic fabrication/fitting/management and training, Manual therapy, therapeutic exercise, home exercise instruction, and modalities as needed to include: electrical stimulation, ultrasound, moist heat, paraffin, fluidotherapy, dry needling, and ice.       Plan  Planned modality interventions: TENS, thermotherapy (hydrocollator packs), thermotherapy (paraffin bath), ultrasound and electrical stimulation/Russian stimulation  Other planned modality interventions: fluidotherapy, dry needling  Planned therapy interventions: manual therapy, ADL retraining, motor coordination training, neuromuscular re-education, soft tissue mobilization, fine motor coordination training, body mechanics training, balance/weight-bearing training, functional ROM exercises, flexibility, spinal/joint mobilization, strengthening, stretching, therapeutic activities, IADL retraining, joint mobilization and home exercise program  Frequency: 2x week  Duration in weeks: 12  Treatment plan discussed with: patient        Patient is indicated for skilled occupational therapy services.    History  # of Personal Factors and/or Comorbidities: MODERATE (1-2)  Examination of Body System(s): # of elements: MODERATE (3)  Clinical Presentation: EVOLVING  Clinical Decision Making: MODERATE     Evaluation:  Low Complexity:    0     mins  84692;  Mod Complexity:    20     mins  69678;  High Complexity:    0     mins  14496;    Timed:  Manual Therapy:    0     mins  33403;  Therapeutic Exercise:    10     mins  81372;  Therapeutic Activity:    10     mins  94772;     Neuromuscular Una:    3    mins  69391;    Ultrasound:     0     mins  98057;    Electrical Stimulation:    0     mins  67712;    Untimed:  Electrical Stimulation:    0     mins  41948 ( );  Fluidotherapy:        0    mins  93676  Dry Needlin    mins  33443    Timed Treatment:   23   mins   Total Treatment:     43   mins      OT SIGNATURE: CLYDE Lara, OTR/L, CHT     Electronically signed    [place] LICENSE: [id number]   DATE TREATMENT INITIATED: 2024    Initial Certification  Certification Period: 2024 thru 10/26/2024  I certify that the therapy services are furnished while this patient is under my care.  The services outlined above are required by this patient, and will be reviewed every 90 days.     Signature:______________________________________________ PHYSICIAN:  Marcelino Redd MD   NPI: [id number]                                      DATE:    Please sign and return via fax to [phone]   Thank you, [facility].

## 2024-07-24 ENCOUNTER — TREATMENT (OUTPATIENT)
Dept: PHYSICAL THERAPY | Facility: CLINIC | Age: 54
End: 2024-07-24
Payer: OTHER MISCELLANEOUS

## 2024-07-24 DIAGNOSIS — S60.031D CONTUSION OF RIGHT MIDDLE FINGER WITHOUT DAMAGE TO NAIL, SUBSEQUENT ENCOUNTER: ICD-10-CM

## 2024-07-24 DIAGNOSIS — S63.692A OTHER SPRAIN OF RIGHT MIDDLE FINGER, INITIAL ENCOUNTER: Primary | ICD-10-CM

## 2024-07-24 NOTE — PROGRESS NOTES
Occupational Therapy Daily Treatment Note   [address] [place] [address]    Patient: Maureen Courtney   : 1970  Referring practitioner: Marcelino Jimenez*  Date of Initial Visit: Type: THERAPY  Noted: 2024  Today's Date: 2024  Patient seen for 2 sessions    ICD-10-CM ICD-9-CM   1. Other sprain of right middle finger, initial encounter  S63.692A 842.19   2. Contusion of right middle finger without damage to nail, subsequent encounter  S60.031D V58.89     923.3          Maureen Courtney reports Exercises are going good 8/10 pain along 1st dorsal compartment.     Functional status  Cont to be limited due to pain this date.     Objective   See Exercise, Manual, and Modality Logs for complete treatment.   Pt needs verbal and physical cues to work AROM in R thumb in the correct positions.  Pt is tight in digital extension of MP joints on R hand.     Assessment/Plan    Added first dorsal compartment stretch to HEP with R hand.     Cont per POC           Timed:  Manual Therapy:    0     mins  30269;  Therapeutic Exercise:    10     mins  96553;  Therapeutic Activity:    10     mins  21356;     Neuromuscular Una:    10    mins  86399;    Ultrasound:     0     mins  76133;    Electrical Stimulation:    0     mins  74653;    Untimed:  Electrical Stimulation:    0     mins  04769 ( );  Fluidotherapy:        0    mins  55390  Dry Needlin    mins      Timed Treatment:   30   mins   Total Treatment:     30   mins    OT SIGNATURE: CLYDE Lara, OTR/L, CHT     Electronically signed    [place] LICENSE: [id number]

## 2024-07-31 ENCOUNTER — TREATMENT (OUTPATIENT)
Dept: PHYSICAL THERAPY | Facility: CLINIC | Age: 54
End: 2024-07-31
Payer: OTHER MISCELLANEOUS

## 2024-07-31 DIAGNOSIS — S63.692A OTHER SPRAIN OF RIGHT MIDDLE FINGER, INITIAL ENCOUNTER: Primary | ICD-10-CM

## 2024-07-31 DIAGNOSIS — S60.031D CONTUSION OF RIGHT MIDDLE FINGER WITHOUT DAMAGE TO NAIL, SUBSEQUENT ENCOUNTER: ICD-10-CM

## 2024-07-31 NOTE — PROGRESS NOTES
Occupational Therapy Daily Treatment Note   [address] [place] [address]    Patient: Maureen Courtney   : 1970  Referring practitioner: Marcelino Jimenez*  Date of Initial Visit: Type: THERAPY  Noted: 2024  Today's Date: 2024  Patient seen for 3 sessions    ICD-10-CM ICD-9-CM   1. Other sprain of right middle finger, initial encounter  S63.692A 842.19   2. Contusion of right middle finger without damage to nail, subsequent encounter  S60.031D V58.89     923.3          Maureen Courtney reports it popped the other day (pointing to R thumb 1st dorsal compartment) and it has been raised and swollen up since.  7/10 pain.      Functional status Having difficulty brushing her hair.    Objective   See Exercise, Manual, and Modality Logs for complete treatment.   Kinesiotape applied to R thumb to support CMC, I strip with wide portion over CMC and I strip split in center over dorsal CMC pulled around volar thumb with 50% pull.     Assessment/Plan    Pt had decreased pain with kinesiotape on R thumb.   Supported CMC with coban for gentle compression.  Pain decreased after treatment.     Cont per POC           Timed:  Manual Therapy:    0     mins  61735;  Therapeutic Exercise:    10     mins  93177;  Therapeutic Activity:    10     mins  80500;     Neuromuscular Una:    10    mins  21068;    Ultrasound:     0     mins  99990;    Electrical Stimulation:    0     mins  30665;    Untimed:  Electrical Stimulation:    0     mins  01702 ( );  Fluidotherapy:        0    mins  04493  Dry Needlin    mins  98151    Timed Treatment:   30   mins   Total Treatment:     30   mins    OT SIGNATURE: CLYDE Lara, OTR/L, CHT     Electronically signed    [place] LICENSE: [id number]

## 2024-08-02 ENCOUNTER — TREATMENT (OUTPATIENT)
Dept: PHYSICAL THERAPY | Facility: CLINIC | Age: 54
End: 2024-08-02
Payer: OTHER MISCELLANEOUS

## 2024-08-02 DIAGNOSIS — S63.692A OTHER SPRAIN OF RIGHT MIDDLE FINGER, INITIAL ENCOUNTER: Primary | ICD-10-CM

## 2024-08-02 DIAGNOSIS — S60.031D CONTUSION OF RIGHT MIDDLE FINGER WITHOUT DAMAGE TO NAIL, SUBSEQUENT ENCOUNTER: ICD-10-CM

## 2024-08-02 NOTE — PROGRESS NOTES
Occupational Therapy Daily Treatment Note   [address] [place] [address]    Patient: Maureen Courtney   : 1970  Referring practitioner: Marcelino Jimenez*  Date of Initial Visit: Type: THERAPY  Noted: 2024  Today's Date: 2024  Patient seen for 4 sessions    ICD-10-CM ICD-9-CM   1. Other sprain of right middle finger, initial encounter  S63.692A 842.19   2. Contusion of right middle finger without damage to nail, subsequent encounter  S60.031D V58.89     923.3          Maureen Courtney reports the tape really helped the pain.  Still hurts when I use it but at 6/10 pain right now       Functional status picking up my coffee cup makes my hand hurt in my long finger.     Objective   See Exercise, Manual, and Modality Logs for complete treatment.   Thumb 0-40      0-55  ABD 45  Ext 45    Needs verbal cures to go through complete ROM with wrist 3 planes and thumb AROM.     Assessment/Plan  AROM has improved in R thumb this date.  Continued complaints of pain with functional tasks.  Progress as tolerated with strengthening.       Cont per POC           Timed:  Manual Therapy:    0     mins  93625;  Therapeutic Exercise:    10     mins  97646;  Therapeutic Activity:    10     mins  33950;     Neuromuscular Una:    10    mins  06766;    Ultrasound:     0     mins  05746;    Electrical Stimulation:    0     mins  37897;    Untimed:  Electrical Stimulation:    0     mins  80988 ( );  Fluidotherapy:        0    mins  39996  Dry Needlin    mins      Timed Treatment:   30   mins   Total Treatment:     30   mins    OT SIGNATURE: CLYDE Lara, OTR/L, CHT     Electronically signed    [place] LICENSE: [id number]

## 2024-08-07 ENCOUNTER — TREATMENT (OUTPATIENT)
Dept: PHYSICAL THERAPY | Facility: CLINIC | Age: 54
End: 2024-08-07
Payer: OTHER MISCELLANEOUS

## 2024-08-07 DIAGNOSIS — S63.692A OTHER SPRAIN OF RIGHT MIDDLE FINGER, INITIAL ENCOUNTER: Primary | ICD-10-CM

## 2024-08-07 DIAGNOSIS — S60.031D CONTUSION OF RIGHT MIDDLE FINGER WITHOUT DAMAGE TO NAIL, SUBSEQUENT ENCOUNTER: ICD-10-CM

## 2024-08-07 NOTE — PROGRESS NOTES
Occupational Therapy Daily Treatment Note   [address] [place] [address]    Patient: Maureen Courtney   : 1970  Referring practitioner: Marcelino Jimenez*  Date of Initial Visit: Type: THERAPY  Noted: 2024  Today's Date: 2024  Patient seen for 5 sessions    ICD-10-CM ICD-9-CM   1. Other sprain of right middle finger, initial encounter  S63.692A 842.19   2. Contusion of right middle finger without damage to nail, subsequent encounter  S60.031D V58.89     923.3          Maureen Courtney reports I've got a lot of things going on. My hand is hurting more. 7/10 pain.  I don't have my Flexeril right now.        Functional status I haven't done anything besides my exercises.     Objective   See Exercise, Manual, and Modality Logs for complete treatment.   R MF (Nail length limiting full composite fist any tighter)  0-75  0-90  0-55     Thumb 0-45      0-55  ABD 40  Ext 45    Right Wrist/Hand   Circumference MCP: 21.2 cm  Circumference wrist: 18.3 cm     Kinesiotape applied to R thumb to support CMC, I strip with wide portion over CMC and I strip split in center over dorsal CMC pulled around volar thumb with 50% pull.     Assessment/Plan  Pt reports swelling in dorsum of thumb is worse, this date noted measurements of swelling are equal at wrist level and CMC joint of R thumb.  Pt AROM in digits are improved, but limited on tighter fist due to length of nails.  Pt reports pain to be in the R thumb and dorsal wrist.  No       Cont per POC           Timed:  Manual Therapy:    0     mins  15104;  Therapeutic Exercise:    10     mins  42902;  Therapeutic Activity:    10     mins  39174;     Neuromuscular Una:    10    mins  57231;    Ultrasound:     0     mins  67705;    Electrical Stimulation:    0     mins  71834;    Untimed:  Electrical Stimulation:    0     mins  85167 ( );  Fluidotherapy:        0    mins  91867  Dry Needlin    mins      Timed Treatment:   30    mins   Total Treatment:     30   mins    OT SIGNATURE: CLYDE Lara, OTR/L, CHT     Electronically signed    [place] LICENSE: [id number]

## 2024-08-09 ENCOUNTER — TREATMENT (OUTPATIENT)
Dept: PHYSICAL THERAPY | Facility: CLINIC | Age: 54
End: 2024-08-09
Payer: OTHER MISCELLANEOUS

## 2024-08-09 DIAGNOSIS — S60.031D CONTUSION OF RIGHT MIDDLE FINGER WITHOUT DAMAGE TO NAIL, SUBSEQUENT ENCOUNTER: ICD-10-CM

## 2024-08-09 DIAGNOSIS — S63.692A OTHER SPRAIN OF RIGHT MIDDLE FINGER, INITIAL ENCOUNTER: Primary | ICD-10-CM

## 2024-08-09 NOTE — PROGRESS NOTES
Occupational Therapy Daily Treatment Note   [address] [place] [address]    Patient: Maureen Courtney   : 1970  Referring practitioner: Marcelino Jimenez*  Date of Initial Visit: Type: THERAPY  Noted: 2024  Today's Date: 2024  Patient seen for 6 sessions    ICD-10-CM ICD-9-CM   1. Other sprain of right middle finger, initial encounter  S63.692A 842.19   2. Contusion of right middle finger without damage to nail, subsequent encounter  S60.031D V58.89     923.3          Maureen Courtney reports My hand swelled so bad  I had to take the tape off.  7/10 pain in R thumb and LF      Functional status have been typing at work and it makes it hurt.     Objective   See Exercise, Manual, and Modality Logs for complete treatment.   Pt reports ext drills pulling on R LF, Intrinsic stretch with hook fisting is tight as well.     Assessment/Plan  Cont to progress functional use of R hand as tolerated.        Cont per POC           Timed:  Manual Therapy:    0     mins  93876;  Therapeutic Exercise:    10     mins  32792;  Therapeutic Activity:    10     mins  08649;     Neuromuscular Una:    10    mins  68071;    Ultrasound:     0     mins  12631;    Electrical Stimulation:    0     mins  45754;    Untimed:  Electrical Stimulation:    0     mins  07188 ( );  Fluidotherapy:        0    mins  63415  Dry Needlin    mins  54079    Timed Treatment:   30   mins   Total Treatment:     30   mins    OT SIGNATURE: CLYDE Lara, OTR/L, CHT     Electronically signed    [place] LICENSE: [id number]

## 2024-08-14 ENCOUNTER — TREATMENT (OUTPATIENT)
Dept: PHYSICAL THERAPY | Facility: CLINIC | Age: 54
End: 2024-08-14
Payer: OTHER MISCELLANEOUS

## 2024-08-14 DIAGNOSIS — S63.692A OTHER SPRAIN OF RIGHT MIDDLE FINGER, INITIAL ENCOUNTER: Primary | ICD-10-CM

## 2024-08-14 DIAGNOSIS — S60.031D CONTUSION OF RIGHT MIDDLE FINGER WITHOUT DAMAGE TO NAIL, SUBSEQUENT ENCOUNTER: ICD-10-CM

## 2024-08-14 NOTE — PROGRESS NOTES
Occupational Therapy Daily Treatment Note   [address] [place] [address]    Patient: Maureen Courtney   : 1970  Referring practitioner: Marcelino Jimenez*  Date of Initial Visit: Type: THERAPY  Noted: 2024  Today's Date: 2024  Patient seen for 7 sessions    ICD-10-CM ICD-9-CM   1. Other sprain of right middle finger, initial encounter  S63.692A 842.19   2. Contusion of right middle finger without damage to nail, subsequent encounter  S60.031D V58.89     923.3          Maureen Courtney reports 5/10 pain in dorsum of LF and thumb is popping when I stretch out.       Functional status I am forcing myself to use it for functional tasks.  I am crocheting but I can't do it for any length of time.    Objective   See Exercise, Manual, and Modality Logs for complete treatment.   Pt educated to do ext drills and ABD drills hanging off the edge of towel to complete with less pain.  RD/UD is painful with #2 weight.  Kinesiotape applied to R CMC for support and along dorsum of R LF extension support     Assessment/Plan    Progressed functional movements this date.     Cont per POC           Timed:  Manual Therapy:    0     mins  64537;  Therapeutic Exercise:    10     mins  75746;  Therapeutic Activity:    10     mins  41303;     Neuromuscular Una:    10    mins  42560;    Ultrasound:     0     mins  07725;    Electrical Stimulation:    0     mins  75314;    Untimed:  Electrical Stimulation:    0     mins  35208 ( );  Fluidotherapy:        0    mins  09199  Dry Needlin    mins  28703    Timed Treatment:   30   mins   Total Treatment:     30   mins    OT SIGNATURE: CLYDE Lara, OTR/L, CHT     Electronically signed    [place] LICENSE: [id number]

## 2024-08-16 ENCOUNTER — TREATMENT (OUTPATIENT)
Dept: PHYSICAL THERAPY | Facility: CLINIC | Age: 54
End: 2024-08-16
Payer: OTHER MISCELLANEOUS

## 2024-08-16 DIAGNOSIS — S60.031D CONTUSION OF RIGHT MIDDLE FINGER WITHOUT DAMAGE TO NAIL, SUBSEQUENT ENCOUNTER: ICD-10-CM

## 2024-08-16 DIAGNOSIS — S63.692A OTHER SPRAIN OF RIGHT MIDDLE FINGER, INITIAL ENCOUNTER: Primary | ICD-10-CM

## 2024-08-16 NOTE — PROGRESS NOTES
Re-Assessment / Re-Certification  Occupational Therapy  [address] [place] [address]      Patient: Maureen Courtney   : 1970  Diagnosis/ICD-10 Code:  Other sprain of right middle finger, initial encounter [S63.692A]  Referring practitioner: Marcelino Jimenez*  Date of Initial Visit: Type: THERAPY  Noted: 2024  Today's Date: 2024  Patient seen for 8 sessions      Subjective:   Maureen Courtney reports: I am frustrated that I still have so much swelling.  7/10 pain  Subjective Questionnaire: QuickDASH: 43  Clinical Progress: improved  Home Program Compliance: Yes  Treatment has included: therapeutic exercise, neuromuscular re-education, manual therapy, and therapeutic activity    Subjective   Objective   Active Range of Motion      Right Wrist   Wrist flexion: 65 degrees   Wrist extension: 65 degrees   Radial deviation: 10 degrees   Ulnar deviation: 10 degrees   Pronation 75 degrees  Supination 75 degrees  Full fist  Thumb 0-40      0-55  ABD 45  Ext 45  0-70 0-90 0-40  0-60 0-90 0-40  0-70 0-90 0-45  0-70 0-90 0-45           Strength/Myotome Testing      Left Wrist/Hand       (2nd hand position)   Left  strength (2nd hand position) 70 lbs     Right Wrist/Hand       (2nd hand position)   Right  strength (2nd hand position) 38 lbs     Swelling      Right Wrist/Hand   Circumference MCP: 21.9 cm  Circumference wrist: 18.6 cm      Assessment/Plan    Visit Diagnoses:    ICD-10-CM ICD-9-CM   1. Other sprain of right middle finger, initial encounter  S63.692A 842.19   2. Contusion of right middle finger without damage to nail, subsequent encounter  S60.031D V58.89     923.3       Progress toward previous goals: Partially Met    Plan Goals: 1. The patient complains of pain in the R hand                   LTG 1: 12 weeks:  The patient will report a pain rating of 0/10 or better in order to improve sleep quality and tolerance to performance of activities of daily living.                                   STATUS:  NOT MET                  STG 1a: 4weeks:  The patient will report a pain rating of 5/10 or better.                                   STATUS:  NOT MET  2. The patient has limited ROM of the R thumb                  LTG 2: 12 weeks:  The patient will demonstrate 120 degrees of R thumb JOVEL to allow the patient to .                                  STATUS:  NOT MET                   STG 2a: 4 weeks:  The patient will demonstrate 100 degrees of R thumb JOVEL.                                  STATUS:  NOT MET                           3. The patient has limited strength of the R UE.                  LTG 3: 12 weeks:  The patient will demonstrate 60# in order to return to lifting at work.                                  STATUS:  NOT MET                  STG 3a: 4 weeks:  The patient will demonstrate tolerance to light strengthening without adverse reaction.                                  STATUS:  NOT MET  4. Carrying, Moving, and Handling Objects Functional Limitation                                    LTG 4: 12 weeks:  The patient will demonstrate 0% limitation by achieving a score of 11 on the Quick DASH.                                  STATUS:  NOT MET                  STG 4a: 4 weeks:  The patient will demonstrate 40-59% limitation by achieving a score of 30 on the Quick DASH.                                    STATUS:  NOT MET      Recommendations: Continue as planned  Timeframe: 2 months  Prognosis to achieve goals: good      OT SIGNATURE: CLYDE Lara, OTR/L, CHT     Electronically signed    [place] LICENSE: [id number]   DATE TREATMENT INITIATED: 8/16/2024          Timed:  Manual Therapy:    0     mins  95819;  Therapeutic Exercise:    10     mins  50214;  Therapeutic Activity:    10     mins  99779;     Neuromuscular Una:    10    mins  94582;    Ultrasound:     0     mins  93485;    Electrical Stimulation:    0     mins  67956;    Untimed:  Electrical Stimulation:    0     mins  93754  ( );  Fluidotherapy:        10    mins  80524  Dry Needlin    mins  39845    Timed Treatment:   30   mins   Total Treatment:     40   mins

## 2024-08-21 ENCOUNTER — TREATMENT (OUTPATIENT)
Dept: PHYSICAL THERAPY | Facility: CLINIC | Age: 54
End: 2024-08-21
Payer: OTHER MISCELLANEOUS

## 2024-08-21 DIAGNOSIS — S60.031D CONTUSION OF RIGHT MIDDLE FINGER WITHOUT DAMAGE TO NAIL, SUBSEQUENT ENCOUNTER: ICD-10-CM

## 2024-08-21 DIAGNOSIS — S63.692A OTHER SPRAIN OF RIGHT MIDDLE FINGER, INITIAL ENCOUNTER: Primary | ICD-10-CM

## 2024-08-21 NOTE — PROGRESS NOTES
Occupational Therapy Daily Treatment Note   [address] [place] [address]    Patient: Maureen Courtney   : 1970  Referring practitioner: Marcelino Jimenez*  Date of Initial Visit: Type: THERAPY  Noted: 2024  Today's Date: 2024  Patient seen for 9 sessions    ICD-10-CM ICD-9-CM   1. Other sprain of right middle finger, initial encounter  S63.692A 842.19   2. Contusion of right middle finger without damage to nail, subsequent encounter  S60.031D V58.89     923.3          Maureen Courtney reports I am hurting pretty bad today.  I cleaned all morning at work. 8/10 pain      Functional status  Continued pain with work tasks.  Cleaning this morning increased pain in R hand.     Objective   See Exercise, Manual, and Modality Logs for complete treatment.   Pain with RD/UD with #2 weight this date.  Cupping helped to reduce pain this date.  Noted that she developed red areas around dorsum and volar thumb.  Will continue to assess for progress    Assessment/Plan  Cont skilled OT for AROM, PROM, strengthening, neuro re-ed, and functional re-ed      Cont per POC           Timed:  Manual Therapy:    10     mins  23588;  Therapeutic Exercise:    10     mins  02277;  Therapeutic Activity:    0     mins  73209;     Neuromuscular Una:    10    mins  48414;    Ultrasound:     0     mins  22095;    Electrical Stimulation:    0     mins  23119;    Untimed:  Electrical Stimulation:    0     mins  76689 ( );  Fluidotherapy:        0    mins  84909  Dry Needlin    mins  83620    Timed Treatment:   30   mins   Total Treatment:     30   mins    OT SIGNATURE: CLYED Lara, OTR/L, CHT     Electronically signed    [place] LICENSE: [id number]

## 2024-08-23 ENCOUNTER — TREATMENT (OUTPATIENT)
Dept: PHYSICAL THERAPY | Facility: CLINIC | Age: 54
End: 2024-08-23
Payer: OTHER MISCELLANEOUS

## 2024-08-23 DIAGNOSIS — S63.692A OTHER SPRAIN OF RIGHT MIDDLE FINGER, INITIAL ENCOUNTER: Primary | ICD-10-CM

## 2024-08-23 DIAGNOSIS — S60.031D CONTUSION OF RIGHT MIDDLE FINGER WITHOUT DAMAGE TO NAIL, SUBSEQUENT ENCOUNTER: ICD-10-CM

## 2024-08-23 NOTE — PROGRESS NOTES
Occupational Therapy Daily Treatment Note   [address] [place] [address]    Patient: Maureen Courtney   : 1970  Referring practitioner: Marcelino Jimenez*  Date of Initial Visit: Type: THERAPY  Noted: 2024  Today's Date: 2024  Patient seen for 10 sessions    ICD-10-CM ICD-9-CM   1. Other sprain of right middle finger, initial encounter  S63.692A 842.19   2. Contusion of right middle finger without damage to nail, subsequent encounter  S60.031D V58.89     923.3          Maureen Courtney reports 6/10 dorsum of LF and 8/10 thumb.  Having popping in R thumb 1xt dorsal compartment       Functional status still having difficulty with lifting at work as it is making her wrist hurt    Objective   See Exercise, Manual, and Modality Logs for complete treatment.   Cupping reduces numbness and tingling.     Assessment/Plan    Added median and ulnar nerve glides to HEP    Cont per POC           Timed:  Manual Therapy:    10     mins  05793;  Therapeutic Exercise:    10     mins  21849;  Therapeutic Activity:    0     mins  24392;     Neuromuscular Una:    10    mins  05944;    Ultrasound:     0     mins  16308;    Electrical Stimulation:    0     mins  70201;    Untimed:  Electrical Stimulation:    0     mins  45364 ( );  Fluidotherapy:        0    mins  75963  Dry Needlin    mins  37327    Timed Treatment:   30   mins   Total Treatment:     30   mins    OT SIGNATURE: CLYDE Lara, TERER/L, CHT     Electronically signed    [place] LICENSE: [id number]

## 2024-08-28 ENCOUNTER — TREATMENT (OUTPATIENT)
Dept: PHYSICAL THERAPY | Facility: CLINIC | Age: 54
End: 2024-08-28
Payer: OTHER MISCELLANEOUS

## 2024-08-28 DIAGNOSIS — S63.692A OTHER SPRAIN OF RIGHT MIDDLE FINGER, INITIAL ENCOUNTER: Primary | ICD-10-CM

## 2024-08-28 DIAGNOSIS — S60.031D CONTUSION OF RIGHT MIDDLE FINGER WITHOUT DAMAGE TO NAIL, SUBSEQUENT ENCOUNTER: ICD-10-CM

## 2024-08-28 NOTE — PROGRESS NOTES
Occupational Therapy Daily Treatment Note   [address] [place] [address]    Patient: Maureen Courtney   : 1970  Referring practitioner: Marcelino Jimenez*  Date of Initial Visit: Type: THERAPY  Noted: 2024  Today's Date: 2024  Patient seen for 11 sessions    ICD-10-CM ICD-9-CM   1. Other sprain of right middle finger, initial encounter  S63.692A 842.19   2. Contusion of right middle finger without damage to nail, subsequent encounter  S60.031D V58.89     923.3          Maureen Courtney reports my hand is really hurting when I  completely open or close it. 6/10 pain in R hand.       Functional status Popping in my thumb when I do stuff.     Objective   See Exercise, Manual, and Modality Logs for complete treatment.   Wrist flexion: 65 degrees   Wrist extension: 65 degrees   Radial deviation: 10 degrees   Ulnar deviation: 20 degrees   Pronation 80 degrees  Supination 80 degrees    Thumb 0-40      0-45  ABD 45  Ext 35    Assessment/Plan  AROM in Wrist is improved this date, AROM in thumb is decreased this date.     Cont per POC           Timed:  Manual Therapy:    10     mins  54872;  Therapeutic Exercise:    10     mins  16422;  Therapeutic Activity:    0     mins  53905;     Neuromuscular Una:    10    mins  77275;    Ultrasound:     0     mins  95277;    Electrical Stimulation:    0     mins  39877;    Untimed:  Electrical Stimulation:    0     mins  56825 ( );  Fluidotherapy:        0    mins  46534  Dry Needlin    mins      Timed Treatment:   30   mins   Total Treatment:     30   mins    OT SIGNATURE: CLYDE Lara, OTR/L, CHT     Electronically signed    [place] LICENSE: [id number]

## 2024-08-30 ENCOUNTER — TREATMENT (OUTPATIENT)
Dept: PHYSICAL THERAPY | Facility: CLINIC | Age: 54
End: 2024-08-30
Payer: OTHER MISCELLANEOUS

## 2024-08-30 DIAGNOSIS — S60.031D CONTUSION OF RIGHT MIDDLE FINGER WITHOUT DAMAGE TO NAIL, SUBSEQUENT ENCOUNTER: ICD-10-CM

## 2024-08-30 DIAGNOSIS — S63.692A OTHER SPRAIN OF RIGHT MIDDLE FINGER, INITIAL ENCOUNTER: Primary | ICD-10-CM

## 2024-08-30 NOTE — PROGRESS NOTES
Occupational Therapy Daily Treatment Note   [address] [place] [address]    Patient: Maureen Courtney   : 1970  Referring practitioner: Marcelino Jimenez*  Date of Initial Visit: Type: THERAPY  Noted: 2024  Today's Date: 2024  Patient seen for 12 sessions    ICD-10-CM ICD-9-CM   1. Other sprain of right middle finger, initial encounter  S63.692A 842.19   2. Contusion of right middle finger without damage to nail, subsequent encounter  S60.031D V58.89     923.3          Maureen Courtney reports I didn't go to work yesterday because of a migraine, so pain is 5/10 today.       Functional status  still having pain with lifting items due to pain in R thumb 1st dorsal compartment.     Objective   See Exercise, Manual, and Modality Logs for complete treatment.   Able to increase resistance with lifting to #3 this date.   Added flexbar this date, pushing up was harder for patient to complete.      Assessment/Plan    Pt progressed functional tolerance with R hand.     Cont per POC           Timed:  Manual Therapy:    0     mins  29263;  Therapeutic Exercise:    10     mins  95639;  Therapeutic Activity:    10     mins  71901;     Neuromuscular Una:    10    mins  76621;    Ultrasound:     0     mins  35639;    Electrical Stimulation:    0     mins  97153;    Untimed:  Electrical Stimulation:    0     mins  95484 ( );  Fluidotherapy:        0    mins  12330  Dry Needlin    mins      Timed Treatment:   30   mins   Total Treatment:     30   mins    OT SIGNATURE: CLYDE Lara, OTR/L, CHT     Electronically signed    [place] LICENSE: [id number]

## 2024-09-04 ENCOUNTER — TREATMENT (OUTPATIENT)
Dept: PHYSICAL THERAPY | Facility: CLINIC | Age: 54
End: 2024-09-04
Payer: OTHER MISCELLANEOUS

## 2024-09-04 DIAGNOSIS — S63.692A OTHER SPRAIN OF RIGHT MIDDLE FINGER, INITIAL ENCOUNTER: Primary | ICD-10-CM

## 2024-09-04 DIAGNOSIS — S60.031D CONTUSION OF RIGHT MIDDLE FINGER WITHOUT DAMAGE TO NAIL, SUBSEQUENT ENCOUNTER: ICD-10-CM

## 2024-09-04 NOTE — PROGRESS NOTES
Occupational Therapy Daily Treatment Note   [address] [place] [address]    Patient: Maureen Courtney   : 1970  Referring practitioner: Marcelino Jimenez*  Date of Initial Visit: Type: THERAPY  Noted: 2024  Today's Date: 2024  Patient seen for 13 sessions    ICD-10-CM ICD-9-CM   1. Other sprain of right middle finger, initial encounter  S63.692A 842.19   2. Contusion of right middle finger without damage to nail, subsequent encounter  S60.031D V58.89     923.3          Maureen Courtney reports I am feeling better. 5/10 pain today.       Functional status My hand goes numb when I am using it.     Objective   See Exercise, Manual, and Modality Logs for complete treatment.   Increased repetitions of wrist 3 planes this date.     Assessment/Plan    Pt encouraged to do nerve gliding exercises daily, adding radial nerve glides to HEP.     Cont per POC           Timed:  Manual Therapy:    0     mins  43432;  Therapeutic Exercise:    10     mins  33893;  Therapeutic Activity:    10     mins  12208;     Neuromuscular Una:    10    mins  45329;    Ultrasound:     0     mins  31944;    Electrical Stimulation:    0     mins  27183;    Untimed:  Electrical Stimulation:    0     mins  14893 ( );  Fluidotherapy:        0    mins  91378  Dry Needlin    mins  63748    Timed Treatment:   30   mins   Total Treatment:     30   mins    OT SIGNATURE: CLYDE Lara, OTR/L, CHT     Electronically signed    [place] LICENSE: [id number]

## 2024-09-06 ENCOUNTER — TREATMENT (OUTPATIENT)
Dept: PHYSICAL THERAPY | Facility: CLINIC | Age: 54
End: 2024-09-06
Payer: OTHER MISCELLANEOUS

## 2024-09-06 DIAGNOSIS — S63.692A OTHER SPRAIN OF RIGHT MIDDLE FINGER, INITIAL ENCOUNTER: Primary | ICD-10-CM

## 2024-09-06 DIAGNOSIS — S60.031D CONTUSION OF RIGHT MIDDLE FINGER WITHOUT DAMAGE TO NAIL, SUBSEQUENT ENCOUNTER: ICD-10-CM

## 2024-09-06 NOTE — PROGRESS NOTES
Occupational Therapy Daily Treatment Note   [address] [place] [address]    Patient: Maureen Courtney   : 1970  Referring practitioner: Marcelino Jimenez*  Date of Initial Visit: Type: THERAPY  Noted: 2024  Today's Date: 2024  Patient seen for 14 sessions    ICD-10-CM ICD-9-CM   1. Other sprain of right middle finger, initial encounter  S63.692A 842.19   2. Contusion of right middle finger without damage to nail, subsequent encounter  S60.031D V58.89     923.3          Maureen Courtney reports I am hurting more today 5/10 pain      Functional status having pain at work today    Objective   See Exercise, Manual, and Modality Logs for complete treatment.   Pt having pain with functional progression this date.  Maintained #3 with lifting due to increase soreness from work tasks.     Assessment/Plan  Focus on nerve glides, heat, and desensitization for HEP this weekend      Cont per POC           Timed:  Manual Therapy:    0     mins  44231;  Therapeutic Exercise:    10     mins  96063;  Therapeutic Activity:    10     mins  36816;     Neuromuscular Una:    10    mins  48710;    Ultrasound:     0     mins  88792;    Electrical Stimulation:    0     mins  35177;    Untimed:  Electrical Stimulation:    0     mins  23323 ( );  Fluidotherapy:        0    mins  10217  Dry Needlin    mins      Timed Treatment:   30   mins   Total Treatment:     30   mins    OT SIGNATURE: CLYDE Lara, OTR/L, CHT     Electronically signed    [place] LICENSE: [id number]

## 2024-09-11 ENCOUNTER — TREATMENT (OUTPATIENT)
Dept: PHYSICAL THERAPY | Facility: CLINIC | Age: 54
End: 2024-09-11
Payer: OTHER MISCELLANEOUS

## 2024-09-11 DIAGNOSIS — S60.031D CONTUSION OF RIGHT MIDDLE FINGER WITHOUT DAMAGE TO NAIL, SUBSEQUENT ENCOUNTER: ICD-10-CM

## 2024-09-11 DIAGNOSIS — S63.692A OTHER SPRAIN OF RIGHT MIDDLE FINGER, INITIAL ENCOUNTER: Primary | ICD-10-CM

## 2024-09-11 PROCEDURE — 97110 THERAPEUTIC EXERCISES: CPT | Performed by: OCCUPATIONAL THERAPIST

## 2024-09-11 PROCEDURE — 97140 MANUAL THERAPY 1/> REGIONS: CPT | Performed by: OCCUPATIONAL THERAPIST

## 2024-09-11 PROCEDURE — 97112 NEUROMUSCULAR REEDUCATION: CPT | Performed by: OCCUPATIONAL THERAPIST

## 2024-09-11 NOTE — PROGRESS NOTES
Occupational Therapy Daily Treatment Note   [address] [place] [address]    Patient: Maureen Courtney   : 1970  Referring practitioner: Marcelino Jimenez*  Date of Initial Visit: Type: THERAPY  Noted: 2024  Today's Date: 2024  Patient seen for 15 sessions    ICD-10-CM ICD-9-CM   1. Other sprain of right middle finger, initial encounter  S63.692A 842.19   2. Contusion of right middle finger without damage to nail, subsequent encounter  S60.031D V58.89     923.3          Maureen Courtney reports it is hurting in the palm from my finger to wrist.  5/10 pain      Functional status still unable to draw due to pain    Objective   See Exercise, Manual, and Modality Logs for complete treatment.   Attempted #4 weight with wrist 3 planes unable to tolerate weight this date.   Reverted back to #3 for 2x10 tolerated fair.    Assessment/Plan    Cont to encourage patient to progress strengthening as tolerated    Cont per POC           Timed:  Manual Therapy:    10     mins  53602;  Therapeutic Exercise:    10     mins  83217;  Therapeutic Activity:    0     mins  87022;     Neuromuscular Una:    10    mins  60852;    Ultrasound:     0     mins  31491;    Electrical Stimulation:    0     mins  86311;    Untimed:  Electrical Stimulation:    0     mins  20354 ( );  Fluidotherapy:        0    mins  96129  Dry Needlin    mins  50313    Timed Treatment:   30   mins   Total Treatment:     30   mins    OT SIGNATURE: CLYDE Lara, TERER/L, CHT     Electronically signed    [place] LICENSE: [id number]

## 2024-09-13 ENCOUNTER — TREATMENT (OUTPATIENT)
Dept: PHYSICAL THERAPY | Facility: CLINIC | Age: 54
End: 2024-09-13
Payer: OTHER MISCELLANEOUS

## 2024-09-13 DIAGNOSIS — S63.692A OTHER SPRAIN OF RIGHT MIDDLE FINGER, INITIAL ENCOUNTER: Primary | ICD-10-CM

## 2024-09-13 DIAGNOSIS — S60.031D CONTUSION OF RIGHT MIDDLE FINGER WITHOUT DAMAGE TO NAIL, SUBSEQUENT ENCOUNTER: ICD-10-CM

## 2024-09-13 NOTE — PROGRESS NOTES
Occupational Therapy Daily Treatment Note   [address] [place] [address]    Patient: Maureen Courtney   : 1970  Referring practitioner: Marcelino Jimenez*  Date of Initial Visit: Type: THERAPY  Noted: 2024  Today's Date: 2024  Patient seen for 16 sessions    ICD-10-CM ICD-9-CM   1. Other sprain of right middle finger, initial encounter  S63.692A 842.19   2. Contusion of right middle finger without damage to nail, subsequent encounter  S60.031D V58.89     923.3          Maureen Courtney reports I have been trying lift more and  more. 5/10 pain       Functional status  Picking up parts at work.     Objective   See Exercise, Manual, and Modality Logs for complete treatment.   Pt continues to have pain with use of flexbar in resisted pronation and twisting.     Assessment/Plan    Cont to encourage patient to progress strengthening and use R hand for functional tasks.     Cont per POC           Timed:  Manual Therapy:    0     mins  54037;  Therapeutic Exercise:    10     mins  33268;  Therapeutic Activity:    10     mins  03376;     Neuromuscular Una:    10    mins  53689;    Ultrasound:     0     mins  78109;    Electrical Stimulation:    0     mins  11838;    Untimed:  Electrical Stimulation:    0     mins  45069 ( );  Fluidotherapy:        0    mins  84213  Dry Needlin    mins  71688    Timed Treatment:   30   mins   Total Treatment:     30   mins    OT SIGNATURE: CLYDE Lara, OTR/L, CHT     Electronically signed    [place] LICENSE: [id number]

## 2024-09-18 ENCOUNTER — TREATMENT (OUTPATIENT)
Dept: PHYSICAL THERAPY | Facility: CLINIC | Age: 54
End: 2024-09-18
Payer: OTHER MISCELLANEOUS

## 2024-09-18 DIAGNOSIS — S63.692A OTHER SPRAIN OF RIGHT MIDDLE FINGER, INITIAL ENCOUNTER: Primary | ICD-10-CM

## 2024-09-18 DIAGNOSIS — S60.031D CONTUSION OF RIGHT MIDDLE FINGER WITHOUT DAMAGE TO NAIL, SUBSEQUENT ENCOUNTER: ICD-10-CM

## 2024-09-19 ENCOUNTER — OFFICE VISIT (OUTPATIENT)
Dept: FAMILY MEDICINE CLINIC | Facility: CLINIC | Age: 54
End: 2024-09-19
Payer: COMMERCIAL

## 2024-09-19 VITALS
SYSTOLIC BLOOD PRESSURE: 156 MMHG | HEIGHT: 63 IN | WEIGHT: 230.2 LBS | DIASTOLIC BLOOD PRESSURE: 70 MMHG | BODY MASS INDEX: 40.79 KG/M2 | OXYGEN SATURATION: 95 % | TEMPERATURE: 97.5 F | HEART RATE: 77 BPM

## 2024-09-19 DIAGNOSIS — F41.9 ANXIETY: ICD-10-CM

## 2024-09-19 DIAGNOSIS — G43.E19 INTRACTABLE CHRONIC MIGRAINE WITH AURA AND WITHOUT STATUS MIGRAINOSUS: Primary | ICD-10-CM

## 2024-09-19 DIAGNOSIS — F17.210 CIGARETTE NICOTINE DEPENDENCE WITHOUT COMPLICATION: ICD-10-CM

## 2024-09-19 PROCEDURE — 96372 THER/PROPH/DIAG INJ SC/IM: CPT | Performed by: NURSE PRACTITIONER

## 2024-09-19 PROCEDURE — 99214 OFFICE O/P EST MOD 30 MIN: CPT | Performed by: NURSE PRACTITIONER

## 2024-09-19 RX ORDER — KETOROLAC TROMETHAMINE 30 MG/ML
30 INJECTION, SOLUTION INTRAMUSCULAR; INTRAVENOUS ONCE
Status: COMPLETED | OUTPATIENT
Start: 2024-09-19 | End: 2024-09-19

## 2024-09-19 RX ORDER — ATOGEPANT 60 MG/1
60 TABLET ORAL DAILY
Qty: 30 TABLET | Refills: 1 | Status: SHIPPED | OUTPATIENT
Start: 2024-09-19

## 2024-09-19 RX ORDER — BUSPIRONE HYDROCHLORIDE 10 MG/1
10 TABLET ORAL 2 TIMES DAILY
Qty: 60 TABLET | Refills: 1 | Status: SHIPPED | OUTPATIENT
Start: 2024-09-19

## 2024-09-19 RX ORDER — KETOROLAC TROMETHAMINE 10 MG/1
10 TABLET, FILM COATED ORAL EVERY 6 HOURS PRN
Qty: 12 TABLET | Refills: 0 | Status: SHIPPED | OUTPATIENT
Start: 2024-09-19

## 2024-09-19 RX ADMIN — KETOROLAC TROMETHAMINE 30 MG: 30 INJECTION, SOLUTION INTRAMUSCULAR; INTRAVENOUS at 12:13

## 2024-09-20 ENCOUNTER — TREATMENT (OUTPATIENT)
Dept: PHYSICAL THERAPY | Facility: CLINIC | Age: 54
End: 2024-09-20
Payer: OTHER MISCELLANEOUS

## 2024-09-20 DIAGNOSIS — S60.031D CONTUSION OF RIGHT MIDDLE FINGER WITHOUT DAMAGE TO NAIL, SUBSEQUENT ENCOUNTER: ICD-10-CM

## 2024-09-20 DIAGNOSIS — S63.692A OTHER SPRAIN OF RIGHT MIDDLE FINGER, INITIAL ENCOUNTER: Primary | ICD-10-CM

## 2024-09-25 ENCOUNTER — TREATMENT (OUTPATIENT)
Dept: PHYSICAL THERAPY | Facility: CLINIC | Age: 54
End: 2024-09-25
Payer: OTHER MISCELLANEOUS

## 2024-09-25 DIAGNOSIS — S63.692A OTHER SPRAIN OF RIGHT MIDDLE FINGER, INITIAL ENCOUNTER: Primary | ICD-10-CM

## 2024-09-25 DIAGNOSIS — S60.031D CONTUSION OF RIGHT MIDDLE FINGER WITHOUT DAMAGE TO NAIL, SUBSEQUENT ENCOUNTER: ICD-10-CM

## 2024-10-23 ENCOUNTER — DOCUMENTATION (OUTPATIENT)
Dept: PHYSICAL THERAPY | Facility: CLINIC | Age: 54
End: 2024-10-23
Payer: COMMERCIAL

## 2024-10-23 DIAGNOSIS — S63.692A OTHER SPRAIN OF RIGHT MIDDLE FINGER, INITIAL ENCOUNTER: Primary | ICD-10-CM

## 2024-10-23 DIAGNOSIS — S60.031D CONTUSION OF RIGHT MIDDLE FINGER WITHOUT DAMAGE TO NAIL, SUBSEQUENT ENCOUNTER: ICD-10-CM

## 2024-10-23 NOTE — PROGRESS NOTES
Discharge Summary  Discharge Summary from Occupational Therapy   [address] [place] [address]    Patient Information  Maureen Courtney  1970    Dates OT visit: 7/19/24-9/25/24  Number of Visits: 19     Discharge Status of Patient: See MD Note dated 9/25/24    Goals: Partially Met    Visit Diagnoses:    ICD-10-CM ICD-9-CM   1. Other sprain of right middle finger, initial encounter  S63.692A 842.19   2. Contusion of right middle finger without damage to nail, subsequent encounter  S60.031D V58.89     923.3       Discharge Plan: Continue with current home exercise program as instructed    Comments MD d/c patient    Date of Discharge 9/25/24        Elisa Rizzo, CLYDE, OTR/L, CHT  Occupational Therapist, Certified Hand therapist    Electronically Signed   [place] LICENSE: [id number]

## 2024-11-07 ENCOUNTER — OFFICE VISIT (OUTPATIENT)
Dept: FAMILY MEDICINE CLINIC | Facility: CLINIC | Age: 54
End: 2024-11-07
Payer: COMMERCIAL

## 2024-11-07 VITALS
BODY MASS INDEX: 41.36 KG/M2 | DIASTOLIC BLOOD PRESSURE: 76 MMHG | OXYGEN SATURATION: 96 % | HEIGHT: 63 IN | HEART RATE: 77 BPM | SYSTOLIC BLOOD PRESSURE: 164 MMHG | WEIGHT: 233.4 LBS | TEMPERATURE: 96.7 F

## 2024-11-07 DIAGNOSIS — G43.E19 INTRACTABLE CHRONIC MIGRAINE WITH AURA AND WITHOUT STATUS MIGRAINOSUS: Primary | ICD-10-CM

## 2024-11-07 PROCEDURE — 99213 OFFICE O/P EST LOW 20 MIN: CPT | Performed by: NURSE PRACTITIONER

## 2024-11-07 RX ORDER — ATOGEPANT 60 MG/1
60 TABLET ORAL DAILY
Qty: 30 TABLET | Refills: 5 | Status: SHIPPED | OUTPATIENT
Start: 2024-11-07

## 2024-11-07 RX ORDER — BUSPIRONE HYDROCHLORIDE 10 MG/1
10 TABLET ORAL 2 TIMES DAILY
Qty: 60 TABLET | Refills: 2 | Status: SHIPPED | OUTPATIENT
Start: 2024-11-07

## 2024-11-07 NOTE — PROGRESS NOTES
Chief Complaint  FMLA Forms    Subjective          Maureen Courtney presents to [facility]  History of Present Illness  She is here for her F[place] paperwork.  [place] is just for migraines.  She has been keeping a migraine headache for the last 3 to 4 months.  She said that the Imitrex and Nurtec will take a little bit off but then it is constant.  The muscle relaxer does not really do anything for the migraines.  She takes 800 of ibuprofen.  We did just start Qulipta and she said that has done some good.  Her blood pressure is elevated today.  She said she is taking all of her medications.  She has been stressed.  Since her sister has passed there has been a lot of changes to where she was always the outcast because she does not live at home but in the process she said she just wants to spend some time with her dad and cannot at times because there is too much drama    Depression: At risk (11/7/2024)    PHQ-2     PHQ-2 Score: 11    and 11/7/2024               Allergies  Beef allergy, Egg-derived products, Influenza virus vaccine, Pork allergy, Shellfish-derived products, Tetanus toxoid, Cephalexin, Coffee, Dust mite extract, Egg phospholipids, Erythromycin, Mixed grasses, Molds & smuts, Msg [monosodium glutamate], Pineapple, and Prednisone    Social History     Tobacco Use    Smoking status: Every Day     Current packs/day: 0.50     Average packs/day: 0.5 packs/day for 42.9 years (21.4 ttl pk-yrs)     Types: Cigarettes     Start date: 1982     Passive exposure: Current    Smokeless tobacco: Never   Vaping Use    Vaping status: Some Days    Substances: Flavoring    Devices: Pre-filled or refillable cartridge   Substance Use Topics    Alcohol use: Not Currently     Comment: Occasionally    Drug use: Never       Family History   Problem Relation Age of Onset    Hypertension Mother     Thyroid disease Mother     Hyperlipidemia Mother     Osteoarthritis Mother     Heart disease Mother   "   Diabetes Father     Stroke Father     Skin cancer Father     Heart failure Sister 38        Downs Syndrome    Heart disease Maternal Grandfather     Cancer Other     Asthma Other     Heart disease Other     Hyperlipidemia Other     Heart failure Other     Hypertension Other     Diabetes Other         Unspecified        Health Maintenance Due   Topic Date Due    Pneumococcal Vaccine 0-64 (1 of 2 - PCV) Never done    Hepatitis B (1 of 3 - 19+ 3-dose series) Never done    ZOSTER VACCINE (1 of 2) Never done    LUNG CANCER SCREENING  Never done    MAMMOGRAM  01/21/2022    URINE MICROALBUMIN  08/02/2024    HEMOGLOBIN A1C  08/06/2024    COLORECTAL CANCER SCREENING  02/28/2025        Immunization History   Administered Date(s) Administered    Fluzone (or Fluarix & Flulaval for VFC) >6mos 10/21/2014    Influenza TIV (IM) 10/21/2014    Influenza, Unspecified 10/21/2014       Review of Systems   Neurological:  Positive for headache. Negative for dizziness and light-headedness.        Objective       Vitals:    11/07/24 1316   BP: 164/76   BP Location: Left arm   Patient Position: Sitting   Cuff Size: Large Adult   Pulse: 77   Temp: 96.7 °F (35.9 °C)   SpO2: 96%   Weight: 106 kg (233 lb 6.4 oz)   Height: 160 cm (63\")       Body mass index is 41.34 kg/m².         Physical Exam  Constitutional:       Appearance: Normal appearance.   HENT:      Head: Normocephalic.   Pulmonary:      Effort: Pulmonary effort is normal.   Skin:     Findings: No bruising.   Neurological:      General: No focal deficit present.      Mental Status: She is alert and oriented to person, place, and time.   Psychiatric:         Mood and Affect: Mood normal.         Behavior: Behavior normal.         Thought Content: Thought content normal.         Judgment: Judgment normal.               Result Review :     The following data was reviewed by: Lisseth Viveros, LEONEL on 11/07/2024:            No Images in the past 120 days found..            "   Assessment and Plan      Assessment & Plan  Intractable chronic migraine with aura and without status migrainosus  We are augusto do a referral to neurology as I believe it is time for her to see either the NP or the neurologist.  I did do FMLA paperwork while she was here in the office.             Orders:    Ambulatory Referral to Neurology    busPIRone (BUSPAR) 10 MG tablet; Take 1 tablet by mouth 2 (Two) Times a Day.    Atogepant (Qulipta) 60 MG tablet; Take 1 tablet by mouth Daily.       Diagnosis Plan   1. Intractable chronic migraine with aura and without status migrainosus  Ambulatory Referral to Neurology    busPIRone (BUSPAR) 10 MG tablet    Atogepant (Qulipta) 60 MG tablet            I spent 21 minutes caring for Maureen on this date of service. This time includes time spent by me in the following activities:preparing for the visit, reviewing tests, obtaining and/or reviewing a separately obtained history, performing a medically appropriate examination and/or evaluation , counseling and educating the patient/family/caregiver, ordering medications, tests, or procedures, referring and communicating with other health care professionals , and documenting information in the medical record    Follow Up     Return in about 1 year (around 11/7/2025), or if symptoms worsen or fail to improve, for for FMLA.    Patient was given instructions and counseling regarding her condition or for health maintenance advice. Please see specific information pulled into the AVS if appropriate.     Parts of this note are electronic transcriptions/translations of spoken language to printed text using the Dragon Dictation system.          Lisseth Viveros, APRN  11/07/2024

## 2024-11-07 NOTE — ASSESSMENT & PLAN NOTE
We are augusto do a referral to neurology as I believe it is time for her to see either the NP or the neurologist.  I did do [place] while she was here in the office.             Orders:    Ambulatory Referral to Neurology    busPIRone (BUSPAR) 10 MG tablet; Take 1 tablet by mouth 2 (Two) Times a Day.    Atogepant (Qulipta) 60 MG tablet; Take 1 tablet by mouth Daily.

## 2024-11-11 ENCOUNTER — TELEPHONE (OUTPATIENT)
Dept: FAMILY MEDICINE CLINIC | Facility: CLINIC | Age: 54
End: 2024-11-11

## 2024-11-11 NOTE — TELEPHONE ENCOUNTER
Caller: Maureen Courtney    Relationship: Self    Best call back number: [phone]     What was the call regarding: PATIENT STATES THE [facility] OFFICE TOLD HER SOME PAGES WERE MISSING FROM THE [facility] PAPER WORK. PATIENT STATES TO PLEASE CALL HER BACK TO DISCUSS THIS ISSUE.

## 2024-12-30 ENCOUNTER — TELEPHONE (OUTPATIENT)
Dept: PODIATRY | Facility: CLINIC | Age: 54
End: 2024-12-30
Payer: COMMERCIAL

## 2024-12-30 NOTE — TELEPHONE ENCOUNTER
Called patient after her ED visit with bone rafita to make appointment in the next 2 weeks with Dr. Pereira per his instructions. Hub to relay

## 2025-01-25 DIAGNOSIS — G89.29 CHRONIC BILATERAL LOW BACK PAIN WITHOUT SCIATICA: ICD-10-CM

## 2025-01-25 DIAGNOSIS — M54.50 CHRONIC BILATERAL LOW BACK PAIN WITHOUT SCIATICA: ICD-10-CM

## 2025-01-25 DIAGNOSIS — J45.20 MILD INTERMITTENT ASTHMA WITHOUT COMPLICATION: ICD-10-CM

## 2025-01-25 RX ORDER — MONTELUKAST SODIUM 10 MG/1
10 TABLET ORAL NIGHTLY
Qty: 30 TABLET | Refills: 0 | OUTPATIENT
Start: 2025-01-25

## 2025-01-25 RX ORDER — CYCLOBENZAPRINE HCL 10 MG
TABLET ORAL
Qty: 30 TABLET | Refills: 0 | OUTPATIENT
Start: 2025-01-25

## 2025-02-05 ENCOUNTER — OFFICE VISIT (OUTPATIENT)
Dept: FAMILY MEDICINE CLINIC | Facility: CLINIC | Age: 55
End: 2025-02-05
Payer: COMMERCIAL

## 2025-02-05 VITALS
RESPIRATION RATE: 16 BRPM | TEMPERATURE: 97.6 F | OXYGEN SATURATION: 95 % | WEIGHT: 221.9 LBS | HEIGHT: 63 IN | SYSTOLIC BLOOD PRESSURE: 158 MMHG | HEART RATE: 70 BPM | DIASTOLIC BLOOD PRESSURE: 80 MMHG | BODY MASS INDEX: 39.32 KG/M2

## 2025-02-05 DIAGNOSIS — G43.919 INTRACTABLE MIGRAINE WITHOUT STATUS MIGRAINOSUS, UNSPECIFIED MIGRAINE TYPE: ICD-10-CM

## 2025-02-05 DIAGNOSIS — E11.9 TYPE 2 DIABETES MELLITUS WITHOUT COMPLICATION, WITHOUT LONG-TERM CURRENT USE OF INSULIN: ICD-10-CM

## 2025-02-05 DIAGNOSIS — G89.29 CHRONIC BILATERAL LOW BACK PAIN WITHOUT SCIATICA: ICD-10-CM

## 2025-02-05 DIAGNOSIS — G43.E19 INTRACTABLE CHRONIC MIGRAINE WITH AURA AND WITHOUT STATUS MIGRAINOSUS: ICD-10-CM

## 2025-02-05 DIAGNOSIS — M54.50 CHRONIC BILATERAL LOW BACK PAIN WITHOUT SCIATICA: ICD-10-CM

## 2025-02-05 DIAGNOSIS — Z00.00 ANNUAL PHYSICAL EXAM: Primary | ICD-10-CM

## 2025-02-05 DIAGNOSIS — I10 PRIMARY HYPERTENSION: ICD-10-CM

## 2025-02-05 DIAGNOSIS — J45.20 MILD INTERMITTENT ASTHMA WITHOUT COMPLICATION: ICD-10-CM

## 2025-02-05 DIAGNOSIS — Z12.2 ENCOUNTER FOR SCREENING FOR LUNG CANCER: ICD-10-CM

## 2025-02-05 LAB
ALBUMIN SERPL-MCNC: 4.4 G/DL (ref 3.5–5.2)
ALBUMIN UR-MCNC: 3.9 MG/DL
ALBUMIN/GLOB SERPL: 1.5 G/DL
ALP SERPL-CCNC: 84 U/L (ref 39–117)
ALT SERPL W P-5'-P-CCNC: 20 U/L (ref 1–33)
ANION GAP SERPL CALCULATED.3IONS-SCNC: 10 MMOL/L (ref 5–15)
AST SERPL-CCNC: 21 U/L (ref 1–32)
BASOPHILS # BLD AUTO: 0.03 10*3/MM3 (ref 0–0.2)
BASOPHILS NFR BLD AUTO: 0.4 % (ref 0–1.5)
BILIRUB SERPL-MCNC: 0.2 MG/DL (ref 0–1.2)
BUN SERPL-MCNC: 12 MG/DL (ref 6–20)
BUN/CREAT SERPL: 14.8 (ref 7–25)
CALCIUM SPEC-SCNC: 9.7 MG/DL (ref 8.6–10.5)
CHLORIDE SERPL-SCNC: 101 MMOL/L (ref 98–107)
CHOLEST SERPL-MCNC: 131 MG/DL (ref 0–200)
CO2 SERPL-SCNC: 29 MMOL/L (ref 22–29)
CREAT SERPL-MCNC: 0.81 MG/DL (ref 0.57–1)
CREAT UR-MCNC: 39.7 MG/DL
DEPRECATED RDW RBC AUTO: 43.9 FL (ref 37–54)
EGFRCR SERPLBLD CKD-EPI 2021: 86.9 ML/MIN/1.73
EOSINOPHIL # BLD AUTO: 0.1 10*3/MM3 (ref 0–0.4)
EOSINOPHIL NFR BLD AUTO: 1.2 % (ref 0.3–6.2)
ERYTHROCYTE [DISTWIDTH] IN BLOOD BY AUTOMATED COUNT: 13.8 % (ref 12.3–15.4)
GLOBULIN UR ELPH-MCNC: 2.9 GM/DL
GLUCOSE SERPL-MCNC: 104 MG/DL (ref 65–99)
HBA1C MFR BLD: 6.2 % (ref 4.8–5.6)
HCT VFR BLD AUTO: 51.6 % (ref 34–46.6)
HDLC SERPL QL: 3.28
HDLC SERPL-MCNC: 40 MG/DL (ref 40–60)
HGB BLD-MCNC: 17.3 G/DL (ref 12–15.9)
IMM GRANULOCYTES # BLD AUTO: 0.05 10*3/MM3 (ref 0–0.05)
IMM GRANULOCYTES NFR BLD AUTO: 0.6 % (ref 0–0.5)
LDLC SERPL CALC-MCNC: 73 MG/DL (ref 0–100)
LYMPHOCYTES # BLD AUTO: 2.27 10*3/MM3 (ref 0.7–3.1)
LYMPHOCYTES NFR BLD AUTO: 28 % (ref 19.6–45.3)
MCH RBC QN AUTO: 29.3 PG (ref 26.6–33)
MCHC RBC AUTO-ENTMCNC: 33.5 G/DL (ref 31.5–35.7)
MCV RBC AUTO: 87.5 FL (ref 79–97)
MICROALBUMIN/CREAT UR: 98.2 MG/G (ref 0–29)
MONOCYTES # BLD AUTO: 0.59 10*3/MM3 (ref 0.1–0.9)
MONOCYTES NFR BLD AUTO: 7.3 % (ref 5–12)
NEUTROPHILS NFR BLD AUTO: 5.08 10*3/MM3 (ref 1.7–7)
NEUTROPHILS NFR BLD AUTO: 62.5 % (ref 42.7–76)
PLATELET # BLD AUTO: 191 10*3/MM3 (ref 140–450)
PMV BLD AUTO: 13.2 FL (ref 6–12)
POTASSIUM SERPL-SCNC: 5.3 MMOL/L (ref 3.5–5.2)
PROT SERPL-MCNC: 7.3 G/DL (ref 6–8.5)
RBC # BLD AUTO: 5.9 10*6/MM3 (ref 3.77–5.28)
SODIUM SERPL-SCNC: 140 MMOL/L (ref 136–145)
TRIGL SERPL-MCNC: 96 MG/DL (ref 0–150)
TSH SERPL DL<=0.05 MIU/L-ACNC: 2.04 UIU/ML (ref 0.27–4.2)
VLDLC SERPL-MCNC: 18 MG/DL (ref 5–40)
WBC NRBC COR # BLD AUTO: 8.12 10*3/MM3 (ref 3.4–10.8)

## 2025-02-05 PROCEDURE — 80050 GENERAL HEALTH PANEL: CPT | Performed by: NURSE PRACTITIONER

## 2025-02-05 PROCEDURE — 83036 HEMOGLOBIN GLYCOSYLATED A1C: CPT | Performed by: NURSE PRACTITIONER

## 2025-02-05 PROCEDURE — 82570 ASSAY OF URINE CREATININE: CPT | Performed by: NURSE PRACTITIONER

## 2025-02-05 PROCEDURE — 80061 LIPID PANEL: CPT | Performed by: NURSE PRACTITIONER

## 2025-02-05 PROCEDURE — 82043 UR ALBUMIN QUANTITATIVE: CPT | Performed by: NURSE PRACTITIONER

## 2025-02-05 RX ORDER — BUSPIRONE HYDROCHLORIDE 10 MG/1
10 TABLET ORAL 2 TIMES DAILY
Qty: 60 TABLET | Refills: 5 | Status: SHIPPED | OUTPATIENT
Start: 2025-02-05

## 2025-02-05 RX ORDER — IBUPROFEN 800 MG/1
800 TABLET, FILM COATED ORAL EVERY 8 HOURS PRN
Qty: 90 TABLET | Refills: 5 | Status: SHIPPED | OUTPATIENT
Start: 2025-02-05

## 2025-02-05 RX ORDER — LOSARTAN POTASSIUM 25 MG/1
25 TABLET ORAL 2 TIMES DAILY
Qty: 60 TABLET | Refills: 5 | Status: SHIPPED | OUTPATIENT
Start: 2025-02-05

## 2025-02-05 RX ORDER — CYCLOBENZAPRINE HCL 10 MG
10 TABLET ORAL NIGHTLY
Qty: 30 TABLET | Refills: 5 | Status: SHIPPED | OUTPATIENT
Start: 2025-02-05

## 2025-02-05 RX ORDER — FAMOTIDINE 20 MG/1
20 TABLET, FILM COATED ORAL 2 TIMES DAILY
COMMUNITY

## 2025-02-05 RX ORDER — SUMATRIPTAN 50 MG/1
TABLET, FILM COATED ORAL
Qty: 27 TABLET | Refills: 1 | Status: SHIPPED | OUTPATIENT
Start: 2025-02-05

## 2025-02-05 RX ORDER — ALBUTEROL SULFATE 90 UG/1
2 INHALANT RESPIRATORY (INHALATION) EVERY 4 HOURS PRN
Qty: 8 G | Refills: 5 | Status: SHIPPED | OUTPATIENT
Start: 2025-02-05

## 2025-02-05 RX ORDER — IBUPROFEN 800 MG/1
800 TABLET, FILM COATED ORAL EVERY 8 HOURS PRN
COMMUNITY
Start: 2025-01-25 | End: 2025-02-05 | Stop reason: SDUPTHER

## 2025-02-05 RX ORDER — HYDROCHLOROTHIAZIDE 25 MG/1
25 TABLET ORAL DAILY
Qty: 30 TABLET | Refills: 5 | Status: SHIPPED | OUTPATIENT
Start: 2025-02-05

## 2025-02-05 RX ORDER — MONTELUKAST SODIUM 10 MG/1
10 TABLET ORAL NIGHTLY
Qty: 30 TABLET | Refills: 5 | Status: SHIPPED | OUTPATIENT
Start: 2025-02-05

## 2025-02-05 RX ORDER — METOPROLOL SUCCINATE 100 MG/1
100 TABLET, EXTENDED RELEASE ORAL DAILY
Qty: 30 TABLET | Refills: 5 | Status: SHIPPED | OUTPATIENT
Start: 2025-02-05

## 2025-02-05 NOTE — ASSESSMENT & PLAN NOTE
Memory and lightheadedness at times.          Orders:    busPIRone (BUSPAR) 10 MG tablet; Take 1 tablet by mouth 2 (Two) Times a Day.    ibuprofen (ADVIL,MOTRIN) 800 MG tablet; Take 1 tablet by mouth Every 8 (Eight) Hours As Needed for Mild Pain.

## 2025-02-05 NOTE — PROGRESS NOTES
Chief Complaint  Hypertension and Migraine    Subjective          Maureen Courtney presents to [facility]  History of Present Illness  Here for check up:  Dentist: dentures  Eye: hasn't been forever--she needs to get  Mammo: needs but doesn't want to do.  Pap: She is due a pelvic exam has not had one recently.  She did have a hysterectomy back in 2013  Family hx: No new changes  Colonoscopy: Needs as she has not had 1.    H/A: She is doing ok with her current med.  She is not having to take more meds.  HTN:  She did take her meds this AM.   back pain:  She is taking her flexeril for the back.  She is still having increased back pain.  She has not done her x-rays.    ASTHMA:  She is having to use the inhaler more since even getting covid.  She is breathing better overall.   Migraine: She is doing well with her Imitrex and Nurtec.  She feels good overall.  She is working 7 days a week 12-hour days and even longer at times.  She goes them at 2 in the morning most of the time  Glucose:  She was on metformin.  We will check her A1c today  12 pds off  Taking supplement for breathing and is doing good.  Depression: Not at risk (2/5/2025)    PHQ-2     PHQ-2 Score: 0   Recent Concern: Depression - At risk (11/7/2024)    PHQ-2     PHQ-2 Score: 11    and 11/7/2024               Allergies  Beef allergy, Egg-derived products, Influenza virus vaccine, Pork allergy, Shellfish-derived products, Tetanus toxoid, Cephalexin, Coffee, Dust mite extract, Egg phospholipids, Erythromycin, Mixed grasses, Molds & smuts, Msg [monosodium glutamate], Pineapple, and Prednisone    Social History     Tobacco Use    Smoking status: Every Day     Current packs/day: 0.50     Average packs/day: 0.5 packs/day for 43.1 years (21.6 ttl pk-yrs)     Types: Cigarettes     Start date: 1982     Passive exposure: Current    Smokeless tobacco: Never   Vaping Use    Vaping status: Some Days    Substances: Flavoring    Devices:  "Pre-filled or refillable cartridge   Substance Use Topics    Alcohol use: Not Currently     Comment: Occasionally    Drug use: Never       Family History   Problem Relation Age of Onset    Hypertension Mother     Thyroid disease Mother     Hyperlipidemia Mother     Osteoarthritis Mother     Heart disease Mother     Diabetes Father     Stroke Father     Skin cancer Father     Lung cancer Father     Heart failure Sister 38        Downs Syndrome    Heart disease Maternal Grandfather     Cancer Other     Asthma Other     Heart disease Other     Hyperlipidemia Other     Heart failure Other     Hypertension Other     Diabetes Other         Unspecified        Health Maintenance Due   Topic Date Due    Hepatitis B (1 of 3 - 19+ 3-dose series) Never done    Pneumococcal Vaccine 0-64 (1 of 2 - PCV) Never done    ZOSTER VACCINE (1 of 2) Never done    LUNG CANCER SCREENING  Never done    MAMMOGRAM  01/21/2022    ANNUAL PHYSICAL  02/06/2025    BMI FOLLOWUP  02/07/2025    COLORECTAL CANCER SCREENING  02/28/2025        Immunization History   Administered Date(s) Administered    Fluzone (or Fluarix & Flulaval for VFC) >6mos 10/21/2014    Influenza TIV (IM) 10/21/2014    Influenza, Unspecified 10/21/2014       Review of Systems   Constitutional:  Negative for fatigue.   Respiratory:  Positive for cough. Negative for shortness of breath.    Cardiovascular:  Negative for chest pain.   Gastrointestinal:  Negative for diarrhea, nausea and vomiting.        Objective       Vitals:    02/05/25 0803 02/05/25 0815   BP: 167/81 158/80   Pulse: 70    Resp: 16    Temp: 97.6 °F (36.4 °C)    SpO2: 95%    Weight: 101 kg (221 lb 14.4 oz)    Height: 160 cm (63\")        Body mass index is 39.31 kg/m².         Physical Exam  Vitals reviewed.   Constitutional:       Appearance: Normal appearance. She is well-developed.   HENT:      Right Ear: Tympanic membrane and ear canal normal.      Left Ear: Tympanic membrane normal.      Mouth/Throat:      " Pharynx: Posterior oropharyngeal erythema present.   Cardiovascular:      Rate and Rhythm: Normal rate and regular rhythm.      Heart sounds: Normal heart sounds. No murmur heard.  Pulmonary:      Effort: Pulmonary effort is normal.      Breath sounds: Wheezing and rhonchi present.   Neurological:      Mental Status: She is alert and oriented to person, place, and time.      Cranial Nerves: No cranial nerve deficit.      Motor: No weakness.   Psychiatric:         Mood and Affect: Mood and affect normal.               Result Review :     The following data was reviewed by: LEONEL River on 02/05/2025:    Common Labs   Common labs          2/5/2025    08:33   Common Labs   Glucose 104    BUN 12    Creatinine 0.81    Sodium 140    Potassium 5.3    Chloride 101    Calcium 9.7    Albumin 4.4    Total Bilirubin 0.2    Alkaline Phosphatase 84    AST (SGOT) 21    ALT (SGPT) 20    WBC 8.12    Hemoglobin 17.3    Hematocrit 51.6    Platelets 191    Total Cholesterol 131    Triglycerides 96    HDL Cholesterol 40    LDL Cholesterol  73    Hemoglobin A1C 6.20    Microalbumin, Urine 3.9            XR Ankle 3+ View Left    Result Date: 12/27/2024  Impression: No acute process. Calcaneal spurring noted. Electronically Signed: Antonietta Ramirez MD  12/27/2024 10:58 AM EST  Workstation ID: [id number]                Assessment and Plan      Assessment & Plan  Annual physical exam  ADVICE WAS GIVEN RE:  ALCOHOL USE, SEATBELT USE, REGULAR EXERCISE, HEALTHY DIET, ROUTINE EYE AND DENTAL EXAMS    Orders:    CBC Auto Differential    Comprehensive Metabolic Panel    Hemoglobin A1c    Lipid Panel With / Chol / HDL Ratio    TSH    Mild intermittent asthma without complication            Orders:    albuterol sulfate  (90 Base) MCG/ACT inhaler; Inhale 2 puffs Every 4 (Four) Hours As Needed for Wheezing.    montelukast (SINGULAIR) 10 MG tablet; Take 1 tablet by mouth Every Night.    Intractable chronic migraine with aura and  without status migrainosus      Memory and lightheadedness at times.          Orders:    busPIRone (BUSPAR) 10 MG tablet; Take 1 tablet by mouth 2 (Two) Times a Day.    ibuprofen (ADVIL,MOTRIN) 800 MG tablet; Take 1 tablet by mouth Every 8 (Eight) Hours As Needed for Mild Pain.    Chronic bilateral low back pain without sciatica    Orders:    cyclobenzaprine (FLEXERIL) 10 MG tablet; Take 1 tablet by mouth Every Night. for muscle spams    Primary hypertension  increasing dose today    Orders:    hydroCHLOROthiazide 25 MG tablet; Take 1 tablet by mouth Daily.    losartan (COZAAR) 25 MG tablet; Take 1 tablet by mouth 2 (Two) Times a Day.    metoprolol succinate XL (TOPROL-XL) 100 MG 24 hr tablet; Take 1 tablet by mouth Daily.    Intractable migraine without status migrainosus, unspecified migraine type              Orders:    rimegepant sulfate ODT (Nurtec) 75 MG tablet; Take 1 tablet by mouth Daily As Needed (headache).    SUMAtriptan (Imitrex) 50 MG tablet; Take one tablet at onset of headache. May repeat dose one time in 2 hours if headache not relieved.    Encounter for screening for lung cancer    Orders:     CT Chest Low Dose Cancer Screening WO; Future    Type 2 diabetes mellitus without complication, without long-term current use of insulin      Orders:    Hemoglobin A1c    Microalbumin / Creatinine Urine Ratio - Urine, Clean Catch       Diagnosis Plan   1. Annual physical exam  CBC Auto Differential    Comprehensive Metabolic Panel    Hemoglobin A1c    Lipid Panel With / Chol / HDL Ratio    TSH      2. Mild intermittent asthma without complication  albuterol sulfate  (90 Base) MCG/ACT inhaler    montelukast (SINGULAIR) 10 MG tablet      3. Intractable chronic migraine with aura and without status migrainosus  busPIRone (BUSPAR) 10 MG tablet    ibuprofen (ADVIL,MOTRIN) 800 MG tablet      4. Chronic bilateral low back pain without sciatica  cyclobenzaprine (FLEXERIL) 10 MG tablet      5. Primary  hypertension  hydroCHLOROthiazide 25 MG tablet    losartan (COZAAR) 25 MG tablet    metoprolol succinate XL (TOPROL-XL) 100 MG 24 hr tablet      6. Intractable migraine without status migrainosus, unspecified migraine type  rimegepant sulfate ODT (Nurtec) 75 MG tablet    SUMAtriptan (Imitrex) 50 MG tablet      7. Encounter for screening for lung cancer   CT Chest Low Dose Cancer Screening WO      8. Type 2 diabetes mellitus without complication, without long-term current use of insulin  Hemoglobin A1c    Microalbumin / Creatinine Urine Ratio - Urine, Clean Catch                Follow Up     Return in about 6 months (around 8/5/2025).  She is going to be seeing neurology soon for the headaches.  She declines to do mammogram or any preventative testing currently.  Patient was given instructions and counseling regarding her condition or for health maintenance advice. Please see specific information pulled into the AVS if appropriate.     Parts of this note are electronic transcriptions/translations of spoken language to printed text using the Dragon Dictation system.          Lisseth Viveros, LEONEL  02/05/2025

## 2025-02-05 NOTE — ASSESSMENT & PLAN NOTE
increasing dose today    Orders:    hydroCHLOROthiazide 25 MG tablet; Take 1 tablet by mouth Daily.    losartan (COZAAR) 25 MG tablet; Take 1 tablet by mouth 2 (Two) Times a Day.    metoprolol succinate XL (TOPROL-XL) 100 MG 24 hr tablet; Take 1 tablet by mouth Daily.

## 2025-02-05 NOTE — ASSESSMENT & PLAN NOTE
Orders:    rimegepant sulfate ODT (Nurtec) 75 MG tablet; Take 1 tablet by mouth Daily As Needed (headache).    SUMAtriptan (Imitrex) 50 MG tablet; Take one tablet at onset of headache. May repeat dose one time in 2 hours if headache not relieved.

## 2025-02-05 NOTE — ASSESSMENT & PLAN NOTE
Orders:    albuterol sulfate  (90 Base) MCG/ACT inhaler; Inhale 2 puffs Every 4 (Four) Hours As Needed for Wheezing.    montelukast (SINGULAIR) 10 MG tablet; Take 1 tablet by mouth Every Night.

## 2025-02-28 ENCOUNTER — PATIENT ROUNDING (BHMG ONLY) (OUTPATIENT)
Dept: FAMILY MEDICINE CLINIC | Facility: CLINIC | Age: 55
End: 2025-02-28
Payer: COMMERCIAL

## 2025-03-07 ENCOUNTER — LAB (OUTPATIENT)
Dept: LAB | Facility: HOSPITAL | Age: 55
End: 2025-03-07
Payer: COMMERCIAL

## 2025-03-07 ENCOUNTER — TRANSCRIBE ORDERS (OUTPATIENT)
Dept: LAB | Facility: HOSPITAL | Age: 55
End: 2025-03-07
Payer: COMMERCIAL

## 2025-03-07 DIAGNOSIS — E11.9 DIABETES MELLITUS WITHOUT COMPLICATION: ICD-10-CM

## 2025-03-07 DIAGNOSIS — I10 ESSENTIAL HYPERTENSION, MALIGNANT: ICD-10-CM

## 2025-03-07 DIAGNOSIS — E11.9 DIABETES MELLITUS WITHOUT COMPLICATION: Primary | ICD-10-CM

## 2025-03-07 LAB
ALBUMIN SERPL-MCNC: 4 G/DL (ref 3.5–5.2)
ALBUMIN UR-MCNC: <1.2 MG/DL
ALBUMIN/GLOB SERPL: 1.4 G/DL
ALP SERPL-CCNC: 85 U/L (ref 39–117)
ALT SERPL W P-5'-P-CCNC: 19 U/L (ref 1–33)
ANION GAP SERPL CALCULATED.3IONS-SCNC: 10 MMOL/L (ref 5–15)
AST SERPL-CCNC: 20 U/L (ref 1–32)
BILIRUB SERPL-MCNC: 0.2 MG/DL (ref 0–1.2)
BUN SERPL-MCNC: 22 MG/DL (ref 6–20)
BUN/CREAT SERPL: 35.5 (ref 7–25)
CALCIUM SPEC-SCNC: 9.1 MG/DL (ref 8.6–10.5)
CHLORIDE SERPL-SCNC: 101 MMOL/L (ref 98–107)
CO2 SERPL-SCNC: 28 MMOL/L (ref 22–29)
CREAT SERPL-MCNC: 0.62 MG/DL (ref 0.57–1)
CREAT UR-MCNC: 19.9 MG/DL
CREAT UR-MCNC: 24.3 MG/DL
EGFRCR SERPLBLD CKD-EPI 2021: 106.6 ML/MIN/1.73
GLOBULIN UR ELPH-MCNC: 2.8 GM/DL
GLUCOSE SERPL-MCNC: 87 MG/DL (ref 65–99)
HBA1C MFR BLD: 6.3 % (ref 4.8–5.6)
MICROALBUMIN/CREAT UR: NORMAL MG/G{CREAT}
POTASSIUM SERPL-SCNC: 4.2 MMOL/L (ref 3.5–5.2)
PROT ?TM UR-MCNC: <4 MG/DL
PROT SERPL-MCNC: 6.8 G/DL (ref 6–8.5)
PROT/CREAT UR: NORMAL MG/G{CREAT}
SODIUM SERPL-SCNC: 139 MMOL/L (ref 136–145)

## 2025-03-07 PROCEDURE — 82043 UR ALBUMIN QUANTITATIVE: CPT

## 2025-03-07 PROCEDURE — 83036 HEMOGLOBIN GLYCOSYLATED A1C: CPT

## 2025-03-07 PROCEDURE — 84156 ASSAY OF PROTEIN URINE: CPT

## 2025-03-07 PROCEDURE — 82570 ASSAY OF URINE CREATININE: CPT

## 2025-03-07 PROCEDURE — 36415 COLL VENOUS BLD VENIPUNCTURE: CPT

## 2025-03-07 PROCEDURE — 80053 COMPREHEN METABOLIC PANEL: CPT

## 2025-03-25 ENCOUNTER — OFFICE VISIT (OUTPATIENT)
Dept: FAMILY MEDICINE CLINIC | Facility: CLINIC | Age: 55
End: 2025-03-25
Payer: COMMERCIAL

## 2025-03-25 VITALS
HEIGHT: 63 IN | BODY MASS INDEX: 36.96 KG/M2 | WEIGHT: 208.6 LBS | SYSTOLIC BLOOD PRESSURE: 128 MMHG | HEART RATE: 73 BPM | RESPIRATION RATE: 18 BRPM | TEMPERATURE: 96.4 F | DIASTOLIC BLOOD PRESSURE: 84 MMHG | OXYGEN SATURATION: 97 %

## 2025-03-25 DIAGNOSIS — G43.919 INTRACTABLE MIGRAINE WITHOUT STATUS MIGRAINOSUS, UNSPECIFIED MIGRAINE TYPE: Primary | ICD-10-CM

## 2025-03-25 DIAGNOSIS — G89.29 CHRONIC BILATERAL LOW BACK PAIN WITH LEFT-SIDED SCIATICA: ICD-10-CM

## 2025-03-25 DIAGNOSIS — M54.10 RADICULAR PAIN OF LEFT LOWER EXTREMITY: ICD-10-CM

## 2025-03-25 DIAGNOSIS — M54.42 CHRONIC BILATERAL LOW BACK PAIN WITH LEFT-SIDED SCIATICA: ICD-10-CM

## 2025-03-25 DIAGNOSIS — Z51.81 ENCOUNTER FOR MEDICATION MONITORING: ICD-10-CM

## 2025-03-25 LAB
AMPHET+METHAMPHET UR QL: NEGATIVE
AMPHETAMINE INTERNAL CONTROL: NORMAL
AMPHETAMINES UR QL: NEGATIVE
BARBITURATE INTERNAL CONTROL: NORMAL
BARBITURATES UR QL SCN: NEGATIVE
BENZODIAZ UR QL SCN: NEGATIVE
BENZODIAZEPINE INTERNAL CONTROL: NORMAL
BILIRUB BLD-MCNC: NEGATIVE MG/DL
BUPRENORPHINE INTERNAL CONTROL: NORMAL
BUPRENORPHINE SERPL-MCNC: NEGATIVE NG/ML
CANNABINOIDS SERPL QL: NEGATIVE
CLARITY, POC: CLEAR
COCAINE INTERNAL CONTROL: NORMAL
COCAINE UR QL: NEGATIVE
COLOR UR: YELLOW
EXPIRATION DATE: NORMAL
EXPIRATION DATE: NORMAL
GLUCOSE UR STRIP-MCNC: NEGATIVE MG/DL
KETONES UR QL: NEGATIVE
LEUKOCYTE EST, POC: NEGATIVE
Lab: NORMAL
Lab: NORMAL
MDMA (ECSTASY) INTERNAL CONTROL: NORMAL
MDMA UR QL SCN: NEGATIVE
METHADONE INTERNAL CONTROL: NORMAL
METHADONE UR QL SCN: NEGATIVE
METHAMPHETAMINE INTERNAL CONTROL: NORMAL
MORPHINE INTERNAL CONTROL: NORMAL
MORPHINE/OPIATES SCREEN, URINE: NEGATIVE
NITRITE UR-MCNC: NEGATIVE MG/ML
OXYCODONE INTERNAL CONTROL: NORMAL
OXYCODONE UR QL SCN: NEGATIVE
PCP UR QL SCN: NEGATIVE
PH UR: 6 [PH] (ref 5–8)
PHENCYCLIDINE INTERNAL CONTROL: NORMAL
PROT UR STRIP-MCNC: NEGATIVE MG/DL
RBC # UR STRIP: NEGATIVE /UL
SP GR UR: 1 (ref 1–1.03)
THC INTERNAL CONTROL: NORMAL
UROBILINOGEN UR QL: NORMAL

## 2025-03-25 RX ORDER — TRAMADOL HYDROCHLORIDE 50 MG/1
50 TABLET ORAL EVERY 8 HOURS PRN
Qty: 24 TABLET | Refills: 0 | Status: SHIPPED | OUTPATIENT
Start: 2025-03-25

## 2025-03-25 RX ORDER — KETOROLAC TROMETHAMINE 30 MG/ML
30 INJECTION, SOLUTION INTRAMUSCULAR; INTRAVENOUS ONCE
Status: COMPLETED | OUTPATIENT
Start: 2025-03-25 | End: 2025-03-25

## 2025-03-25 RX ADMIN — KETOROLAC TROMETHAMINE 30 MG: 30 INJECTION, SOLUTION INTRAMUSCULAR; INTRAVENOUS at 15:47

## 2025-03-25 NOTE — PROGRESS NOTES
Chief Complaint  Back Pain and Sciatica (Left hip - states burns and hurts)    Subjective          Maureen Courtney presents to [facility]  Back Pain    Sciatica      History of Present Illness  The patient presents for evaluation of left leg pain, migraines, and cough.    She reports experiencing a sensation akin to a torch in her left leg, which she describes as feeling hot and tingling. The discomfort radiates down the side of her leg and extends into her groin area. She has not sought emergency care for this issue. She does not believe the pain is muscular in nature. She has previously received PRP treatment for her knee following an injury, but it did not provide significant relief. She has been managing her back pain with ibuprofen and Tylenol, occasionally taking two Tylenol tablets concurrently. She has been using a muscle relaxer.    She has been prescribed Qulipta for her migraines, which she finds beneficial in reducing the severity of her symptoms, although it does not completely eliminate them. She was not given Nurtec. Despite this, she continues to experience daily migraines. She recalls a particularly severe episode last Tuesday, which resulted in her missing work due to sleep deprivation caused by the migraine. She also takes ibuprofen and Tylenol.    She reports an increase in coughing frequency, which is unusual for her. She believes this may be related to allergies and sinus issues due to the weather. She typically responds well to antibiotics and prednisone, but on this occasion, she required three days of bed rest. She has not used CBD products. Her last breathing treatment was approximately two weeks ago.    Supplemental Information  She was ill a few weeks ago and missed 3 days of work. She went to urgent care and was told it was just short of being pneumonia. Her nephrologist changed her losartan from 25 mg to 100 mg plus 25 mg of the water  pill.    ALLERGIES  The patient has no known allergies.    MEDICATIONS  Current: Qulipta, ibuprofen, Tylenol, losartan      Patient or patient representative verbalized consent for the use of Ambient Listening during the visit with  LEONEL River for chart documentation. 3/25/2025  15:08 EDT                   Allergies  Beef allergy, Egg-derived products, Influenza virus vaccine, Pork allergy, Shellfish-derived products, Tetanus toxoid, Cephalexin, Coffee, Dust mite extract, Egg phospholipids, Erythromycin, Mixed grasses, Molds & smuts, Msg [monosodium glutamate], Pineapple, and Prednisone    Social History     Tobacco Use    Smoking status: Every Day     Current packs/day: 0.50     Average packs/day: 0.5 packs/day for 43.2 years (21.6 ttl pk-yrs)     Types: Cigarettes     Start date: 1982     Passive exposure: Current    Smokeless tobacco: Never   Vaping Use    Vaping status: Some Days    Substances: Flavoring    Devices: Pre-filled or refillable cartridge   Substance Use Topics    Alcohol use: Not Currently     Comment: Occasionally    Drug use: Never       Family History   Problem Relation Age of Onset    Hypertension Mother     Thyroid disease Mother     Hyperlipidemia Mother     Osteoarthritis Mother     Heart disease Mother     Diabetes Father     Stroke Father     Skin cancer Father     Lung cancer Father     Heart failure Sister 38        Downs Syndrome    Heart disease Maternal Grandfather     Cancer Other     Asthma Other     Heart disease Other     Hyperlipidemia Other     Heart failure Other     Hypertension Other     Diabetes Other         Unspecified        Health Maintenance Due   Topic Date Due    Hepatitis B (1 of 3 - 19+ 3-dose series) Never done    LUNG CANCER SCREENING  Never done    ANNUAL PHYSICAL  02/06/2025    COLORECTAL CANCER SCREENING  02/28/2025        Immunization History   Administered Date(s) Administered    Fluzone (or Fluarix & Flulaval for VFC) >6mos 10/21/2014     "Influenza TIV (IM) 10/21/2014    Influenza, Unspecified 10/21/2014       Review of Systems   Musculoskeletal:  Positive for arthralgias and back pain.   Neg bladder leaking     Objective       Vitals:    03/25/25 1457   BP: 128/84   Pulse: 73   Resp: 18   Temp: 96.4 °F (35.8 °C)   SpO2: 97%   Weight: 94.6 kg (208 lb 9.6 oz)   Height: 160 cm (63\")       Body mass index is 36.95 kg/m².         Physical Exam  Vitals reviewed.   Constitutional:       Appearance: Normal appearance. She is well-developed.   Cardiovascular:      Rate and Rhythm: Normal rate and regular rhythm.      Heart sounds: Normal heart sounds. No murmur heard.  Pulmonary:      Effort: Pulmonary effort is normal.      Breath sounds: Wheezing present.   Musculoskeletal:      Comments: Neg leg lifts   Neurological:      Mental Status: She is alert and oriented to person, place, and time.      Cranial Nerves: No cranial nerve deficit.      Motor: No weakness.   Psychiatric:         Mood and Affect: Mood and affect normal.           Physical Exam  Wheezing noted in the lungs.    Vital Signs  Blood pressure is normal.              Result Review :     The following data was reviewed by: LEONEL River on 03/25/2025:    Common Labs   Common labs          2/5/2025    08:33 3/7/2025    15:19 3/7/2025    15:22   Common Labs   Glucose 104  87     BUN 12  22     Creatinine 0.81  0.62     Sodium 140  139     Potassium 5.3  4.2     Chloride 101  101     Calcium 9.7  9.1     Albumin 4.4  4.0     Total Bilirubin 0.2  0.2     Alkaline Phosphatase 84  85     AST (SGOT) 21  20     ALT (SGPT) 20  19     WBC 8.12      Hemoglobin 17.3      Hematocrit 51.6      Platelets 191      Total Cholesterol 131      Triglycerides 96      HDL Cholesterol 40      LDL Cholesterol  73      Hemoglobin A1C 6.20  6.30     Microalbumin, Urine 3.9   <1.2      UA   Lab Results   Component Value Date    COLORU Yellow 03/25/2025    CLARITYU Clear 03/25/2025    PHUR 6.0 03/25/2025    " KETONESU Negative 03/25/2025    BILIRUBINUR Negative 03/25/2025    LEUKOCYTESUR Negative 03/25/2025    UROBILINOGEN 0.2 E.U./dL 03/25/2025     UDS   Amphetamine Screen, Urine   Date Value Ref Range Status   03/25/2025 Negative Negative Final     AMP INTERNAL CONTROL   Date Value Ref Range Status   03/25/2025 Passed Passed Final     Barbiturates Screen, Urine   Date Value Ref Range Status   03/25/2025 Negative Negative Final     Buprenorphine, Screen, Urine   Date Value Ref Range Status   03/25/2025 Negative Negative Final     BUPRENORPHINE INTERNAL CONTROL   Date Value Ref Range Status   03/25/2025 Passed Passed Final     Benzodiazepine Screen, Urine   Date Value Ref Range Status   03/25/2025 Negative Negative Final     BENZODIAZEPINE INTERNAL CONTROL   Date Value Ref Range Status   03/25/2025 Passed Passed Final     Cocaine Screen, Urine   Date Value Ref Range Status   03/25/2025 Negative Negative Final     COCAINE INTERNAL CONTROL   Date Value Ref Range Status   03/25/2025 Passed Passed Final     MDMA (ECSTASY)   Date Value Ref Range Status   03/25/2025 Negative Negative Final     MDMA (ECSTASY) INTERNAL CONTROL   Date Value Ref Range Status   03/25/2025 Passed Passed Final     Methamphetamine, Ur   Date Value Ref Range Status   03/25/2025 Negative Negative Final     METHAMPHETAMINE INTERNAL CONTROL   Date Value Ref Range Status   03/25/2025 Passed Passed Final     Methadone Screen, Urine   Date Value Ref Range Status   03/25/2025 Negative Negative Final     Oxycodone Screen, Urine   Date Value Ref Range Status   03/25/2025 Negative Negative Final     OXYCODONE INTERNAL CONTROL   Date Value Ref Range Status   03/25/2025 Passed Passed Final     PHENCYCLIDINE INTERNAL CONTROL   Date Value Ref Range Status   03/25/2025 Passed Passed Final     THC, Screen, Urine   Date Value Ref Range Status   03/25/2025 Negative Negative Final     THC INTERNAL CONTROL   Date Value Ref Range Status   03/25/2025 Passed Passed Final      Lot Number   Date Value Ref Range Status   03/25/2025 [id number]  Final     Expiration Date   Date Value Ref Range Status   03/25/2025 08/31/2025  Final           XR Chest 2 View  Result Date: 2/26/2025  Impression: No acute cardiopulmonary abnormality. Electronically Signed: Romy Renee MD  2/26/2025 9:02 AM EST  Workstation ID: [id number]    XR Ankle 3+ View Left  Result Date: 12/27/2024  Impression: No acute process. Calcaneal spurring noted. Electronically Signed: Antonietta Ramirez MD  12/27/2024 10:58 AM EST  Workstation ID: [id number]           Results  Imaging  Lumbar spine x-rays from February 2024 showed degenerative changes mostly between L2-L5 area, no pars defect. Pelvis x-ray showed no asymmetrical findings.                    Assessment and Plan      Assessment & Plan  Intractable migraine without status migrainosus, unspecified migraine type      She is stable until she see's neuro.        Orders:    MRI Lumbar Spine Without Contrast; Future    POC Medline 12 Panel Urine Drug Screen    traMADol (ULTRAM) 50 MG tablet; Take 1 tablet by mouth Every 8 (Eight) Hours As Needed for Moderate Pain or Severe Pain.    POCT urinalysis dipstick, automated    Chronic bilateral low back pain with left-sided sciatica  We will do MRI to check for nerve issues.  Orders:    MRI Lumbar Spine Without Contrast; Future    POC Medline 12 Panel Urine Drug Screen    traMADol (ULTRAM) 50 MG tablet; Take 1 tablet by mouth Every 8 (Eight) Hours As Needed for Moderate Pain or Severe Pain.    ketorolac (TORADOL) injection 30 mg    POCT urinalysis dipstick, automated    Radicular pain of left lower extremity  We will do low dose tramadol as a trial--if needed we will do a referral to pain mgt if this doesn't work.  Consent was signed.  UDS and valeriy checked.  Orders:    MRI Lumbar Spine Without Contrast; Future    POC Medline 12 Panel Urine Drug Screen    traMADol (ULTRAM) 50 MG tablet; Take 1 tablet by mouth Every 8 (Eight) Hours As  Needed for Moderate Pain or Severe Pain.    ketorolac (TORADOL) injection 30 mg    POCT urinalysis dipstick, automated    Encounter for medication monitoring    Orders:    POC Medline 12 Panel Urine Drug Screen    ketorolac (TORADOL) injection 30 mg    POCT urinalysis dipstick, automated       Diagnosis Plan   1. Intractable migraine without status migrainosus, unspecified migraine type  MRI Lumbar Spine Without Contrast    POC Medline 12 Panel Urine Drug Screen    traMADol (ULTRAM) 50 MG tablet    POCT urinalysis dipstick, automated      2. Chronic bilateral low back pain with left-sided sciatica  MRI Lumbar Spine Without Contrast    POC Medline 12 Panel Urine Drug Screen    traMADol (ULTRAM) 50 MG tablet    ketorolac (TORADOL) injection 30 mg    POCT urinalysis dipstick, automated      3. Radicular pain of left lower extremity  MRI Lumbar Spine Without Contrast    POC Medline 12 Panel Urine Drug Screen    traMADol (ULTRAM) 50 MG tablet    ketorolac (TORADOL) injection 30 mg    POCT urinalysis dipstick, automated      4. Encounter for medication monitoring  POC Medline 12 Panel Urine Drug Screen    ketorolac (TORADOL) injection 30 mg    POCT urinalysis dipstick, automated            Assessment & Plan  1. Left leg pain.  The patient's left leg pain radiates from the SI joint down the side of the leg and into the groin. Previous lumbar spine x-rays from February 2024 showed degenerative changes between L2-L5 with no pars defect. The SI joint appeared normal on x-ray. A Toradol injection will be administered today for immediate relief. A prescription for tramadol every 8 hours will be provided to assess tolerance. A urine sample will be collected for drug screening. An MRI of the lumbar spine will be ordered to further investigate the cause of the pain.    2. Migraines.  The patient has been using Qulipta as a preventative measure but still experiences daily migraines. Nurtec was not approved by insurance. She reports  that ibuprofen and Tylenol help manage the severity of her migraines. Advised to continue with Qulipta and use ibuprofen and Tylenol as needed for pain management.    3. Cough.  The patient reports a persistent cough lasting about four weeks, likely due to allergies and sinus issues exacerbated by the weather. Advised to perform a breathing treatment nightly or approximately one hour prior to bedtime to alleviate wheezing.    PROCEDURE  The patient has previously received PRP treatment for her knee following an injury, but it did not provide significant relief.          Follow Up     Return if symptoms worsen or fail to improve.    Patient was given instructions and counseling regarding her condition or for health maintenance advice. Please see specific information pulled into the AVS if appropriate.     Parts of this note are electronic transcriptions/translations of spoken language to printed text using the Dragon Dictation system.          Lisseth Viveros, APRN  03/25/2025

## 2025-03-25 NOTE — ASSESSMENT & PLAN NOTE
She is stable until she see's neuro.        Orders:    MRI Lumbar Spine Without Contrast; Future    POC Medline 12 Panel Urine Drug Screen    traMADol (ULTRAM) 50 MG tablet; Take 1 tablet by mouth Every 8 (Eight) Hours As Needed for Moderate Pain or Severe Pain.    POCT urinalysis dipstick, automated

## 2025-04-01 ENCOUNTER — TELEPHONE (OUTPATIENT)
Dept: FAMILY MEDICINE CLINIC | Facility: CLINIC | Age: 55
End: 2025-04-01

## 2025-04-01 NOTE — TELEPHONE ENCOUNTER
Caller: Maureen Courtney    Relationship: Self    Best call back number: [phone]     What was the call regarding: PATIENT STATES SHE WOULD LIKE TO KNOW IF THE PRIOR AUTHORIZATION WAS APPROVED FOR TRAMADOL.

## 2025-05-01 ENCOUNTER — TELEPHONE (OUTPATIENT)
Dept: FAMILY MEDICINE CLINIC | Facility: CLINIC | Age: 55
End: 2025-05-01
Payer: COMMERCIAL

## 2025-05-01 DIAGNOSIS — G89.29 CHRONIC BILATERAL LOW BACK PAIN WITH LEFT-SIDED SCIATICA: Primary | ICD-10-CM

## 2025-05-01 DIAGNOSIS — M54.10 RADICULAR PAIN OF LEFT LOWER EXTREMITY: ICD-10-CM

## 2025-05-01 DIAGNOSIS — M54.42 CHRONIC BILATERAL LOW BACK PAIN WITH LEFT-SIDED SCIATICA: Primary | ICD-10-CM

## 2025-05-01 NOTE — TELEPHONE ENCOUNTER
MRI IS DENIED     WILL NEED PHYSICAL THERAPY    ORDER #[id number]    P2P#:  [phone]  P2P TIME FRAME: NOT PROVIDED     FC NEEDS TO KNOW BY 1:00 SO THEY CAN CANCEL OR RESCHEDULE

## 2025-05-05 DIAGNOSIS — G89.29 CHRONIC BILATERAL LOW BACK PAIN WITH LEFT-SIDED SCIATICA: ICD-10-CM

## 2025-05-05 DIAGNOSIS — M54.42 CHRONIC BILATERAL LOW BACK PAIN WITH LEFT-SIDED SCIATICA: ICD-10-CM

## 2025-05-05 DIAGNOSIS — M54.10 RADICULAR PAIN OF LEFT LOWER EXTREMITY: ICD-10-CM

## 2025-05-05 DIAGNOSIS — G43.919 INTRACTABLE MIGRAINE WITHOUT STATUS MIGRAINOSUS, UNSPECIFIED MIGRAINE TYPE: ICD-10-CM

## 2025-05-05 NOTE — TELEPHONE ENCOUNTER
Caller: Maureen Courtney    Relationship: Self    Best call back number: [phone]     Requested Prescriptions:   Requested Prescriptions     Pending Prescriptions Disp Refills    traMADol (ULTRAM) 50 MG tablet 24 tablet 0     Sig: Take 1 tablet by mouth Every 8 (Eight) Hours As Needed for Moderate Pain or Severe Pain.        Pharmacy where request should be sent: [id number] [place] [facility] [address] AT [facility] [phone] [facility] [phone]      Last office visit with prescribing clinician: 3/25/2025   Last telemedicine visit with prescribing clinician: Visit date not found   Next office visit with prescribing clinician: 8/6/2025     Additional details provided by patient:     PATIENT OUT OF MEDICATION.    PATIENT UNABLE TO DO PHYSICAL THERAPY DUE TO NOT MEETING DEDUCTIBLE AND IT WILL COST HER OVER 100 DOLLARS EVERY TIME SHE GOES AND SHE CANNOT AFFORD THAT AT THIS TIME. PATIENT REQUESTING A REFILL ON MEDICATION TO HELP WITH BACK PAIN.     CONTACT PATIENT TO ADVISE.     Does the patient have less than a 3 day supply:  [x] Yes  [] No    Would you like a call back once the refill request has been completed: [x] Yes [] No    If the office needs to give you a call back, can they leave a voicemail: [x] Yes [] No    Marycruz Hilton Rep   05/05/25 14:32 EDT

## 2025-05-06 RX ORDER — TRAMADOL HYDROCHLORIDE 50 MG/1
50 TABLET ORAL EVERY 8 HOURS PRN
Qty: 24 TABLET | Refills: 0 | OUTPATIENT
Start: 2025-05-06